# Patient Record
Sex: MALE | Race: WHITE | NOT HISPANIC OR LATINO | URBAN - METROPOLITAN AREA
[De-identification: names, ages, dates, MRNs, and addresses within clinical notes are randomized per-mention and may not be internally consistent; named-entity substitution may affect disease eponyms.]

---

## 2020-11-04 ENCOUNTER — OFFICE (AMBULATORY)
Dept: URBAN - METROPOLITAN AREA CLINIC 64 | Facility: CLINIC | Age: 70
End: 2020-11-04

## 2020-11-04 VITALS
DIASTOLIC BLOOD PRESSURE: 90 MMHG | WEIGHT: 173 LBS | HEART RATE: 69 BPM | HEIGHT: 68 IN | SYSTOLIC BLOOD PRESSURE: 147 MMHG

## 2020-11-04 DIAGNOSIS — Z12.11 ENCOUNTER FOR SCREENING FOR MALIGNANT NEOPLASM OF COLON: ICD-10-CM

## 2020-11-04 DIAGNOSIS — E80.7 DISORDER OF BILIRUBIN METABOLISM, UNSPECIFIED: ICD-10-CM

## 2020-11-04 DIAGNOSIS — E80.4 GILBERT SYNDROME: ICD-10-CM

## 2020-11-04 PROCEDURE — 99202 OFFICE O/P NEW SF 15 MIN: CPT | Performed by: INTERNAL MEDICINE

## 2023-02-09 ENCOUNTER — HOSPITAL ENCOUNTER (OUTPATIENT)
Facility: HOSPITAL | Age: 73
Discharge: HOME | End: 2023-02-12
Attending: EMERGENCY MEDICINE | Admitting: STUDENT IN AN ORGANIZED HEALTH CARE EDUCATION/TRAINING PROGRAM
Payer: MEDICARE

## 2023-02-09 ENCOUNTER — APPOINTMENT (OUTPATIENT)
Dept: GENERAL RADIOLOGY | Facility: HOSPITAL | Age: 73
End: 2023-02-09
Payer: MEDICARE

## 2023-02-09 ENCOUNTER — APPOINTMENT (OUTPATIENT)
Dept: ULTRASOUND IMAGING | Facility: HOSPITAL | Age: 73
End: 2023-02-09
Payer: MEDICARE

## 2023-02-09 DIAGNOSIS — R74.8 ELEVATED LIVER ENZYMES: ICD-10-CM

## 2023-02-09 DIAGNOSIS — R07.9 CHEST PAIN, UNSPECIFIED TYPE: ICD-10-CM

## 2023-02-09 DIAGNOSIS — K80.00 CALCULUS OF GALLBLADDER WITH ACUTE CHOLECYSTITIS WITHOUT OBSTRUCTION: Primary | ICD-10-CM

## 2023-02-09 DIAGNOSIS — R94.31 ABNORMAL EKG: ICD-10-CM

## 2023-02-09 DIAGNOSIS — I10 HYPERTENSION, UNSPECIFIED TYPE: ICD-10-CM

## 2023-02-09 DIAGNOSIS — R17 ELEVATED BILIRUBIN: ICD-10-CM

## 2023-02-09 PROBLEM — R79.89 ABNORMAL LFTS: Status: ACTIVE | Noted: 2023-02-09

## 2023-02-09 PROBLEM — K80.20 CHOLELITHIASIS: Status: ACTIVE | Noted: 2023-02-09

## 2023-02-09 PROBLEM — R77.8 ELEVATED TROPONIN: Status: ACTIVE | Noted: 2023-02-09

## 2023-02-09 LAB
ALBUMIN SERPL-MCNC: 4.7 G/DL (ref 3.5–5.2)
ALBUMIN/GLOB SERPL: 1.6 G/DL
ALP SERPL-CCNC: 212 U/L (ref 39–117)
ALT SERPL W P-5'-P-CCNC: 575 U/L (ref 1–41)
ANION GAP SERPL CALCULATED.3IONS-SCNC: 13 MMOL/L (ref 5–15)
APTT PPP: 26 SECONDS (ref 22.7–35.4)
AST SERPL-CCNC: 574 U/L (ref 1–40)
BASOPHILS # BLD AUTO: 0.04 10*3/MM3 (ref 0–0.2)
BASOPHILS NFR BLD AUTO: 0.7 % (ref 0–1.5)
BILIRUB SERPL-MCNC: 4.9 MG/DL (ref 0–1.2)
BUN SERPL-MCNC: 14 MG/DL (ref 8–23)
BUN/CREAT SERPL: 14.4 (ref 7–25)
CALCIUM SPEC-SCNC: 10.1 MG/DL (ref 8.6–10.5)
CHLORIDE SERPL-SCNC: 105 MMOL/L (ref 98–107)
CO2 SERPL-SCNC: 29 MMOL/L (ref 22–29)
CREAT SERPL-MCNC: 0.97 MG/DL (ref 0.76–1.27)
DEPRECATED RDW RBC AUTO: 41.2 FL (ref 37–54)
EGFRCR SERPLBLD CKD-EPI 2021: 81.9 ML/MIN/1.73
EOSINOPHIL # BLD AUTO: 0.03 10*3/MM3 (ref 0–0.4)
EOSINOPHIL NFR BLD AUTO: 0.5 % (ref 0.3–6.2)
ERYTHROCYTE [DISTWIDTH] IN BLOOD BY AUTOMATED COUNT: 12.6 % (ref 12.3–15.4)
GEN 5 2HR TROPONIN T REFLEX: 15 NG/L
GLOBULIN UR ELPH-MCNC: 2.9 GM/DL
GLUCOSE SERPL-MCNC: 126 MG/DL (ref 65–99)
HCT VFR BLD AUTO: 40.9 % (ref 37.5–51)
HGB BLD-MCNC: 13.5 G/DL (ref 13–17.7)
HOLD SPECIMEN: NORMAL
HOLD SPECIMEN: NORMAL
IMM GRANULOCYTES # BLD AUTO: 0.01 10*3/MM3 (ref 0–0.05)
IMM GRANULOCYTES NFR BLD AUTO: 0.2 % (ref 0–0.5)
INR PPP: 1.02 (ref 0.9–1.1)
LYMPHOCYTES # BLD AUTO: 0.91 10*3/MM3 (ref 0.7–3.1)
LYMPHOCYTES NFR BLD AUTO: 14.9 % (ref 19.6–45.3)
MAGNESIUM SERPL-MCNC: 2.3 MG/DL (ref 1.6–2.4)
MCH RBC QN AUTO: 30 PG (ref 26.6–33)
MCHC RBC AUTO-ENTMCNC: 33 G/DL (ref 31.5–35.7)
MCV RBC AUTO: 90.9 FL (ref 79–97)
MONOCYTES # BLD AUTO: 0.62 10*3/MM3 (ref 0.1–0.9)
MONOCYTES NFR BLD AUTO: 10.2 % (ref 5–12)
NEUTROPHILS NFR BLD AUTO: 4.48 10*3/MM3 (ref 1.7–7)
NEUTROPHILS NFR BLD AUTO: 73.5 % (ref 42.7–76)
NRBC BLD AUTO-RTO: 0 /100 WBC (ref 0–0.2)
NT-PROBNP SERPL-MCNC: 395 PG/ML (ref 0–900)
PLATELET # BLD AUTO: 260 10*3/MM3 (ref 140–450)
PMV BLD AUTO: 9.8 FL (ref 6–12)
POTASSIUM SERPL-SCNC: 4.6 MMOL/L (ref 3.5–5.2)
PROT SERPL-MCNC: 7.6 G/DL (ref 6–8.5)
PROTHROMBIN TIME: 13.5 SECONDS (ref 11.7–14.2)
QT INTERVAL: 381 MS
RBC # BLD AUTO: 4.5 10*6/MM3 (ref 4.14–5.8)
SODIUM SERPL-SCNC: 147 MMOL/L (ref 136–145)
TROPONIN T DELTA: 7 NG/L
TROPONIN T SERPL HS-MCNC: 8 NG/L
WBC NRBC COR # BLD: 6.09 10*3/MM3 (ref 3.4–10.8)
WHOLE BLOOD HOLD COAG: NORMAL
WHOLE BLOOD HOLD SPECIMEN: NORMAL

## 2023-02-09 PROCEDURE — 83735 ASSAY OF MAGNESIUM: CPT | Performed by: EMERGENCY MEDICINE

## 2023-02-09 PROCEDURE — 80053 COMPREHEN METABOLIC PANEL: CPT | Performed by: EMERGENCY MEDICINE

## 2023-02-09 PROCEDURE — G0378 HOSPITAL OBSERVATION PER HR: HCPCS

## 2023-02-09 PROCEDURE — 84484 ASSAY OF TROPONIN QUANT: CPT | Performed by: EMERGENCY MEDICINE

## 2023-02-09 PROCEDURE — 85025 COMPLETE CBC W/AUTO DIFF WBC: CPT | Performed by: EMERGENCY MEDICINE

## 2023-02-09 PROCEDURE — 99284 EMERGENCY DEPT VISIT MOD MDM: CPT

## 2023-02-09 PROCEDURE — 93010 ELECTROCARDIOGRAM REPORT: CPT | Performed by: INTERNAL MEDICINE

## 2023-02-09 PROCEDURE — 93005 ELECTROCARDIOGRAM TRACING: CPT | Performed by: EMERGENCY MEDICINE

## 2023-02-09 PROCEDURE — 76705 ECHO EXAM OF ABDOMEN: CPT

## 2023-02-09 PROCEDURE — 83880 ASSAY OF NATRIURETIC PEPTIDE: CPT | Performed by: EMERGENCY MEDICINE

## 2023-02-09 PROCEDURE — 96374 THER/PROPH/DIAG INJ IV PUSH: CPT

## 2023-02-09 PROCEDURE — 85730 THROMBOPLASTIN TIME PARTIAL: CPT | Performed by: EMERGENCY MEDICINE

## 2023-02-09 PROCEDURE — 71046 X-RAY EXAM CHEST 2 VIEWS: CPT

## 2023-02-09 PROCEDURE — 93005 ELECTROCARDIOGRAM TRACING: CPT

## 2023-02-09 PROCEDURE — 85610 PROTHROMBIN TIME: CPT | Performed by: EMERGENCY MEDICINE

## 2023-02-09 PROCEDURE — 99203 OFFICE O/P NEW LOW 30 MIN: CPT | Performed by: SURGERY

## 2023-02-09 PROCEDURE — 36415 COLL VENOUS BLD VENIPUNCTURE: CPT | Performed by: EMERGENCY MEDICINE

## 2023-02-09 RX ORDER — SODIUM CHLORIDE 0.9 % (FLUSH) 0.9 %
10 SYRINGE (ML) INJECTION AS NEEDED
Status: DISCONTINUED | OUTPATIENT
Start: 2023-02-09 | End: 2023-02-12 | Stop reason: HOSPADM

## 2023-02-09 RX ORDER — LISINOPRIL 10 MG/1
10 TABLET ORAL DAILY
Status: DISCONTINUED | OUTPATIENT
Start: 2023-02-09 | End: 2023-02-12 | Stop reason: HOSPADM

## 2023-02-09 RX ORDER — NALOXONE HCL 0.4 MG/ML
0.4 VIAL (ML) INJECTION
Status: DISCONTINUED | OUTPATIENT
Start: 2023-02-09 | End: 2023-02-12 | Stop reason: HOSPADM

## 2023-02-09 RX ORDER — LISINOPRIL 10 MG/1
TABLET ORAL DAILY
COMMUNITY
End: 2023-03-08

## 2023-02-09 RX ORDER — ONDANSETRON 2 MG/ML
4 INJECTION INTRAMUSCULAR; INTRAVENOUS EVERY 6 HOURS PRN
Status: DISCONTINUED | OUTPATIENT
Start: 2023-02-09 | End: 2023-02-12 | Stop reason: HOSPADM

## 2023-02-09 RX ORDER — SODIUM CHLORIDE 0.9 % (FLUSH) 0.9 %
10 SYRINGE (ML) INJECTION EVERY 12 HOURS SCHEDULED
Status: DISCONTINUED | OUTPATIENT
Start: 2023-02-09 | End: 2023-02-12 | Stop reason: HOSPADM

## 2023-02-09 RX ORDER — ASPIRIN 325 MG
325 TABLET ORAL ONCE
Status: DISCONTINUED | OUTPATIENT
Start: 2023-02-09 | End: 2023-02-09

## 2023-02-09 RX ORDER — MORPHINE SULFATE 2 MG/ML
1 INJECTION, SOLUTION INTRAMUSCULAR; INTRAVENOUS EVERY 4 HOURS PRN
Status: DISCONTINUED | OUTPATIENT
Start: 2023-02-09 | End: 2023-02-12 | Stop reason: HOSPADM

## 2023-02-09 RX ORDER — ATORVASTATIN CALCIUM 10 MG/1
TABLET, FILM COATED ORAL DAILY
COMMUNITY
End: 2023-02-12 | Stop reason: HOSPADM

## 2023-02-09 RX ORDER — FAMOTIDINE 10 MG/ML
20 INJECTION, SOLUTION INTRAVENOUS EVERY 12 HOURS SCHEDULED
Status: DISCONTINUED | OUTPATIENT
Start: 2023-02-09 | End: 2023-02-12 | Stop reason: HOSPADM

## 2023-02-09 RX ORDER — NITROGLYCERIN 0.4 MG/1
0.4 TABLET SUBLINGUAL
Status: DISCONTINUED | OUTPATIENT
Start: 2023-02-09 | End: 2023-02-12 | Stop reason: HOSPADM

## 2023-02-09 RX ORDER — SODIUM CHLORIDE 9 MG/ML
40 INJECTION, SOLUTION INTRAVENOUS AS NEEDED
Status: DISCONTINUED | OUTPATIENT
Start: 2023-02-09 | End: 2023-02-12 | Stop reason: HOSPADM

## 2023-02-09 RX ADMIN — Medication 10 ML: at 20:29

## 2023-02-09 RX ADMIN — FAMOTIDINE 20 MG: 10 INJECTION INTRAVENOUS at 20:29

## 2023-02-09 NOTE — ED TRIAGE NOTES
.Pt masked on arrival, staff masked    Pt reports intermittent episodes of chest discomfort over the last month, pain returned this am, describes as pressure, is mid-sternal, belching a lot today

## 2023-02-09 NOTE — H&P
Internal medicine history and physical  INTERNAL MEDICINE   [facility]       Patient Identification:  Name: Guillermo Salgado  Age: 72 y.o.  Sex: male  :  1950  MRN: [id number]                   Primary Care Physician: Radha Jacobson MD                               Date of admission:2023    Chief Complaint: Mid chest discomfort started few hours after eating his meal last night then resolved only to recur again at 3:30 in the morning with associated nausea.    History of Present Illness:   Patient is a 72-year-old male with past medical history of prior abnormal LFTs and was told that he has Gilbert syndrome, history of hypertension and dyslipidemia and takes only lisinopril and Lipitor was in his usual state of his health until about a month or so ago when he started having episodes of chest discomfort couple hours after eating food.  This episode lasted for hours or so and go away and in those episodes he may have some episodes of nausea.  Over the course of last month he has about 3 or 4 episodes with last episode prior to yesterday was about a week ago.  Patient was feeling otherwise fine had some soup yesterday and afterwards noticed significant discomfort in his chest and upper abdomen that resolved over the course of couple hours.  He was able to go to bed and woke up 3:30 in the morning with this discomfort.  This time it did not resolve by itself he was very concerned about possibility of heart involvement.  This discomfort feels as if his pressure involving the middle of the chest going into the back and does not get worse with activity or movement.  Evaluation in the emergency room revealed abnormal LFTs including elevated bilirubin and initial troponin being negative.  His EKG is unremarkable.  Repeat troponin shows elevation with delta of 7.  Patient is currently pain-free and feels well.  Ultrasound of the abdomen reveal cholelithiasis with gallbladder sludge without any  "evidence of cholecystitis.  CBD diameter was noted to be 6 mm.  Patient is being admitted for further care.    Past Medical History:  Past Medical History:   Diagnosis Date   • Gilbert syndrome    • Hyperlipidemia    • Hypertension      Past Surgical History:  Past Surgical History:   Procedure Laterality Date   • PILONIDAL CYST DRAINAGE        Home Meds:  Medications Prior to Admission   Medication Sig Dispense Refill Last Dose   • atorvastatin (LIPITOR) 10 MG tablet Take  by mouth Daily.   2/9/2023   • lisinopril (PRINIVIL,ZESTRIL) 10 MG tablet Take  by mouth Daily.        Current Meds:     Current Facility-Administered Medications:   •  sodium chloride 0.9 % flush 10 mL, 10 mL, Intravenous, PRN, Alonso Boyle MD  Allergies:  No Known Allergies  Social History:   Social History     Tobacco Use   • Smoking status: Former     Types: Cigarettes   • Smokeless tobacco: Not on file   Substance Use Topics   • Alcohol use: Yes      Family History:  History reviewed. No pertinent family history.       Review of Systems  See history of present illness and past medical history.   Constitutional: Remarkable for no fever or chills  Cardiovascular: Remarkable for chest discomfort as described it is clearly associated after having a meal.  The details of which are mentioned in the history of presenting illness.  Respiratory: Remarkable for no cough hemoptysis or shortness of breath or pleuritic chest pain  GI: Remarkable for chest discomfort as described  : Remarkable for no burning in urination frequency or urgency  Musculoskeletal: Remarkable for no new joint aches and pain patient denies any muscle pain.  Neurological: Remarkable for no loss of consciousness or continence.  Remainder of ROS is negative.      Vitals:   /82 (BP Location: Left arm, Patient Position: Lying)   Pulse 99   Temp 98.1 °F (36.7 °C) (Oral)   Resp 16   Ht 172.7 cm (68\")   Wt 77.1 kg (170 lb)   SpO2 99%   BMI 25.85 kg/m²   I/O: No " intake or output data in the 24 hours ending 02/09/23 2569  Exam:  Patient is examined using the personal protective equipment as per guidelines from infection control for this particular patient as enacted.  Hand washing was performed before and after patient interaction.  General Appearance:    Alert, cooperative, no distress, appears stated age   Head:    Normocephalic, without obvious abnormality, atraumatic   Eyes:    PERRL, conjunctiva/corneas icteric, EOM's intact, both eyes   Ears:    Normal external ear canals, both ears   Nose:   Nares normal, septum midline, mucosa normal, no drainage    or sinus tenderness   Throat:   Lips, tongue, gums normal; oral mucosa pink and moist   Neck:   Supple, symmetrical, trachea midline, no adenopathy;     thyroid:  no enlargement/tenderness/nodules; no carotid    bruit or JVD   Back:     Symmetric, no curvature, ROM normal, no CVA tenderness   Lungs:     Clear to auscultation bilaterally, respirations unlabored   Chest Wall:    No tenderness or deformity    Heart:   S1-S2 regular no murmur heard   Abdomen:    No significant epigastric or right upper quadrant tenderness or rebound noted   Extremities:   Extremities normal, atraumatic, no cyanosis or edema   Pulses:   Pulses palpable in all extremities; symmetric all extremities   Skin:   Skin color normal, Skin is warm and dry,  no rashes or palpable lesions   Neurologic:  Alert and oriented and grossly nonfocal       Data Review:      I reviewed the patient's new clinical results.  Results from last 7 days   Lab Units 02/09/23  1259   WBC 10*3/mm3 6.09   HEMOGLOBIN g/dL 13.5   PLATELETS 10*3/mm3 260     Results from last 7 days   Lab Units 02/09/23  1259   SODIUM mmol/L 147*   POTASSIUM mmol/L 4.6   CHLORIDE mmol/L 105   CO2 mmol/L 29.0   BUN mg/dL 14   CREATININE mg/dL 0.97   CALCIUM mg/dL 10.1   GLUCOSE mg/dL 126*     Microbiology Results (last 10 days)     ** No results found for the last 240 hours. **      .rad  ECG 12  Lead Chest Pain   Final Result   HEART RATE= 87  bpm   RR Interval= 690  ms   NM Interval= 163  ms   P Horizontal Axis= -5  deg   P Front Axis= 72  deg   QRSD Interval= 107  ms   QT Interval= 381  ms   QRS Axis= -17  deg   T Wave Axis= 97  deg   - ABNORMAL ECG -   Sinus rhythm   Probable left atrial enlargement   Borderline left axis deviation   Borderline repolarization abnormality   No Prior Tracing for Comparison   Electronically Signed By: Carine Saldana ([facility]) 09-Feb-2023 15:57:46   Date and Time of Study: 2023-02-09 12:48:27        Repeat troponin went up from 8-15 with the delta of 7  Assessment:  Active Hospital Problems    Diagnosis  POA   • **Chest pain, unspecified type [R07.9]  Yes   • Abnormal LFTs [R79.89]  Unknown   • Cholelithiasis [K80.20]  Unknown   • Elevated troponin [R77.8]  Unknown   • Hypertension [I10]  Unknown       Medical decision making/care plan:  Chest discomfort recurrent off-and-on worse in the course of last month or so specifically after eating a meal with abnormal LFTs and gallbladder evidence of cholelithiasis with normal EKG and slight worsening of high sensitive troponin after initial 1 being normal-in the setting of prior history of dyslipidemia and hypertension.  This presentation in this above context is concerning for:  1-symptomatic cholelithiasis with possible choledocholithiasis occurring on an intermittent basis explaining the frequent prandial discomfort as detailed above with no clinical evidence of acute cholecystitis at present  2-given slight worsening of his high sensitive troponin and risk factors concomitant/superimposed coronary event with no EKG changes  Plan is to consult general surgery service and cardiology service get MRCP and check serial troponin and keep him n.p.o. after midnight.  Patient does not have any evidence of acute cholecystitis and at present concern for cholangitis is low so would not start any antibiotic therapy.  Check lipase level in  a.m.  Hypertension-continue with antihypertensive regimen  History of dyslipidemia on atorvastatin-given his abnormal LFTs hold atorvastatin    Anoop Trujillo MD   2/9/2023  16:39 EST    Parts of this note may be an electronic transcription/translation of spoken language to printed text using the Dragon dictation system.

## 2023-02-09 NOTE — ED NOTES
"Nursing report ED to floor  Guillermo Salgado  72 y.o.  male    HPI :   Chief Complaint   Patient presents with    Chest Pain       Admitting doctor:   Anoop Trujillo MD    Admitting diagnosis:   The primary encounter diagnosis was Chest pain, unspecified type. Diagnoses of Abnormal EKG, Hypertension, unspecified type, Elevated liver enzymes, and Elevated bilirubin were also pertinent to this visit.    Code status:   Current Code Status       Date Active Code Status Order ID Comments User Context       Not on file            Allergies:   Patient has no known allergies.    Isolation:   No active isolations    Intake and Output  No intake or output data in the 24 hours ending 02/09/23 1516    Weight:       02/09/23  1248   Weight: 77.1 kg (170 lb)       Most recent vitals:   Vitals:    02/09/23 1240 02/09/23 1248 02/09/23 1251 02/09/23 1308   BP:   148/83 150/84   BP Location:    Right arm   Patient Position:    Lying   Pulse: 78   78   Resp: 18   14   Temp: 98.7 °F (37.1 °C)      TempSrc: Tympanic      SpO2: 98%   98%   Weight:  77.1 kg (170 lb)     Height:  172.7 cm (68\")         Active LDAs/IV Access:   Lines, Drains & Airways       Active LDAs       None                    Labs (abnormal labs have a star):   Labs Reviewed   COMPREHENSIVE METABOLIC PANEL - Abnormal; Notable for the following components:       Result Value    Glucose 126 (*)     Sodium 147 (*)     ALT (SGPT) 575 (*)     AST (SGOT) 574 (*)     Alkaline Phosphatase 212 (*)     Total Bilirubin 4.9 (*)     All other components within normal limits    Narrative:     GFR Normal >60  Chronic Kidney Disease <60  Kidney Failure <15    The GFR formula is only valid for adults with stable renal function between ages 18 and 70.   CBC WITH AUTO DIFFERENTIAL - Abnormal; Notable for the following components:    Lymphocyte % 14.9 (*)     All other components within normal limits   TROPONIN - Normal    Narrative:     High Sensitive Troponin T Reference Range:  <10.0 ng/L- " Negative Female for AMI  <15.0 ng/L- Negative Male for AMI  >=10 - Abnormal Female indicating possible myocardial injury.  >=15 - Abnormal Male indicating possible myocardial injury.   Clinicians would have to utilize clinical acumen, EKG, Troponin, and serial changes to determine if it is an Acute Myocardial Infarction or myocardial injury due to an underlying chronic condition.        PROTIME-INR - Normal   APTT - Normal   MAGNESIUM - Normal   BNP (IN-HOUSE) - Normal    Narrative:     Among patients with dyspnea, NT-proBNP is highly sensitive for the detection of acute congestive heart failure. In addition NT-proBNP of <300 pg/ml effectively rules out acute congestive heart failure with 99% negative predictive value.    Results may be falsely decreased if patient taking Biotin.     RAINBOW DRAW    Narrative:     The following orders were created for panel order [place] Draw.  Procedure                               Abnormality         Status                     ---------                               -----------         ------                     Green Top (Gel)[[id number]]                                  Final result               Lavender Top[[id number]]                                     Final result               Gold Top - SST[[id number]]                                   Final result               Light Blue Top[[id number]]                                   Final result                 Please view results for these tests on the individual orders.   HIGH SENSITIVITIY TROPONIN T 2HR   CBC AND DIFFERENTIAL    Narrative:     The following orders were created for panel order CBC & Differential.  Procedure                               Abnormality         Status                     ---------                               -----------         ------                     CBC Auto Differential[[id number]]        Abnormal            Final result                 Please view results for these tests on the individual orders.    GREEN TOP   LAVENDER TOP   GOLD TOP - SST   LIGHT BLUE TOP       EKG:   ECG 12 Lead Chest Pain   Preliminary Result   HEART RATE= 87  bpm   RR Interval= 690  ms   RI Interval= 163  ms   P Horizontal Axis= -5  deg   P Front Axis= 72  deg   QRSD Interval= 107  ms   QT Interval= 381  ms   QRS Axis= -17  deg   T Wave Axis= 97  deg   - ABNORMAL ECG -   Sinus rhythm   Probable left atrial enlargement   Borderline left axis deviation   Abnormal R-wave progression, early transition   Borderline repolarization abnormality   Electronically Signed By:    Date and Time of Study: 2023-02-09 12:48:27          Meds given in ED:   Medications   sodium chloride 0.9 % flush 10 mL (has no administration in time range)       Imaging results:  XR Chest 2 View    Result Date: 2/9/2023  No evidence for acute pulmonary process. Follow-up as clinical indications persist.  This report was finalized on 2/9/2023 1:59 PM by Dr. Mahendra Sigala M.D.       Ambulatory status:   - ad edgardo    Social issues:   Social History     Socioeconomic History    Marital status:    Tobacco Use    Smoking status: Former     Types: Cigarettes   Substance and Sexual Activity    Alcohol use: Yes    Drug use: Never       NIH Stroke Scale:         Cheryl Martins RN  02/09/23 15:16 EST

## 2023-02-09 NOTE — ED PROVIDER NOTES
EMERGENCY DEPARTMENT ENCOUNTER  I wore full protective equipment throughout this patient encounter including a N95 mask, eye shield, gown and gloves. Hand hygiene was performed before donning protective equipment and after removal when leaving the room.    Room Number:  15/15  Date of encounter:  2/9/2023  PCP: Radha Jacobson MD  Patient Care Team:  Radha Jacobson MD as PCP - General (Family Medicine)     HPI:  Context: Guillermo Salgado is a 72 y.o. male who presents to the ED c/o chief complaint of chest pain.  Patient complains of well localized sternal chest pain that began at approximately 330 this morning.  Chest pain lasted until he was on his way into the hospital and then now resolved.  Patient denies any chest pain at present, no other symptoms at present.  Patient reports chest pain was described as pressure, sternal, radiating through to the back.  No radiation to neck or extremities, pain was nonexertional, no associated shortness of breath, nausea vomiting, diaphoresis.  Patient reports that he had an episode of similar chest pain last night although it resolved.  Patient reports that he has had 2 more of these episodes over the last month.  Patient denies any cardiac history, does have a history of hypertension hyperlipidemia, remote reports remote history of stress test that was negative.  Patient is not followed by cardiology.  Patient denies any abdominal pain.  Patient does report that last night when he had the pain the pain was worsened with pressure in his belly.  Patient reports he has been eating and drinking normally, normal bowel function.    MEDICAL HISTORY REVIEW  Reviewed in EPIC    PAST MEDICAL HISTORY  Active Ambulatory Problems     Diagnosis Date Noted   • No Active Ambulatory Problems     Resolved Ambulatory Problems     Diagnosis Date Noted   • No Resolved Ambulatory Problems     Past Medical History:   Diagnosis Date   • Gilbert syndrome    • Hyperlipidemia    • Hypertension         PAST SURGICAL HISTORY  Past Surgical History:   Procedure Laterality Date   • PILONIDAL CYST DRAINAGE         FAMILY HISTORY  History reviewed. No pertinent family history.    SOCIAL HISTORY  Social History     Socioeconomic History   • Marital status:    Tobacco Use   • Smoking status: Former     Types: Cigarettes   Substance and Sexual Activity   • Alcohol use: Yes   • Drug use: Never       ALLERGIES  Patient has no known allergies.    The patient's allergies have been reviewed    REVIEW OF SYSTEMS  All systems reviewed and negative except for those discussed in HPI.     PHYSICAL EXAM  I have reviewed the triage vital signs and nursing notes.  ED Triage Vitals   Temp Heart Rate Resp BP SpO2   02/09/23 1240 02/09/23 1240 02/09/23 1240 02/09/23 1251 02/09/23 1240   98.7 °F (37.1 °C) 78 18 148/83 98 %      Temp src Heart Rate Source Patient Position BP Location FiO2 (%)   02/09/23 1240 -- -- -- --   Tympanic           General: No acute distress.  HENT: NCAT, PERRL, Nares patent.  Eyes: no scleral icterus.  Neck: trachea midline, no ROM limitations.  CV: regular rhythm, regular rate.  Respiratory: normal effort, CTAB.  Abdomen: soft, nondistended, NTTP, no rebound tenderness, no guarding or rigidity.  Musculoskeletal: no deformity.  Neuro: alert, moves all extremities, follows commands.  Skin: warm, dry.    LAB RESULTS  Recent Results (from the past 24 hour(s))   ECG 12 Lead Chest Pain    Collection Time: 02/09/23 12:48 PM   Result Value Ref Range    QT Interval 381 ms   Comprehensive Metabolic Panel    Collection Time: 02/09/23 12:59 PM    Specimen: Blood   Result Value Ref Range    Glucose 126 (H) 65 - 99 mg/dL    BUN 14 8 - 23 mg/dL    Creatinine 0.97 0.76 - 1.27 mg/dL    Sodium 147 (H) 136 - 145 mmol/L    Potassium 4.6 3.5 - 5.2 mmol/L    Chloride 105 98 - 107 mmol/L    CO2 29.0 22.0 - 29.0 mmol/L    Calcium 10.1 8.6 - 10.5 mg/dL    Total Protein 7.6 6.0 - 8.5 g/dL    Albumin 4.7 3.5 - 5.2 g/dL    ALT  (SGPT) 575 (H) 1 - 41 U/L    AST (SGOT) 574 (H) 1 - 40 U/L    Alkaline Phosphatase 212 (H) 39 - 117 U/L    Total Bilirubin 4.9 (H) 0.0 - 1.2 mg/dL    Globulin 2.9 gm/dL    A/G Ratio 1.6 g/dL    BUN/Creatinine Ratio 14.4 7.0 - 25.0    Anion Gap 13.0 5.0 - 15.0 mmol/L    eGFR 81.9 >60.0 mL/min/1.73   High Sensitivity Troponin T    Collection Time: 02/09/23 12:59 PM    Specimen: Blood   Result Value Ref Range    HS Troponin T 8 <15 ng/L   Green Top (Gel)    Collection Time: 02/09/23 12:59 PM   Result Value Ref Range    Extra Tube Hold for add-ons.    Lavender Top    Collection Time: 02/09/23 12:59 PM   Result Value Ref Range    Extra Tube hold for add-on    Gold Top - SST    Collection Time: 02/09/23 12:59 PM   Result Value Ref Range    Extra Tube Hold for add-ons.    Light Blue Top    Collection Time: 02/09/23 12:59 PM   Result Value Ref Range    Extra Tube Hold for add-ons.    CBC Auto Differential    Collection Time: 02/09/23 12:59 PM    Specimen: Blood   Result Value Ref Range    WBC 6.09 3.40 - 10.80 10*3/mm3    RBC 4.50 4.14 - 5.80 10*6/mm3    Hemoglobin 13.5 13.0 - 17.7 g/dL    Hematocrit 40.9 37.5 - 51.0 %    MCV 90.9 79.0 - 97.0 fL    MCH 30.0 26.6 - 33.0 pg    MCHC 33.0 31.5 - 35.7 g/dL    RDW 12.6 12.3 - 15.4 %    RDW-SD 41.2 37.0 - 54.0 fl    MPV 9.8 6.0 - 12.0 fL    Platelets 260 140 - 450 10*3/mm3    Neutrophil % 73.5 42.7 - 76.0 %    Lymphocyte % 14.9 (L) 19.6 - 45.3 %    Monocyte % 10.2 5.0 - 12.0 %    Eosinophil % 0.5 0.3 - 6.2 %    Basophil % 0.7 0.0 - 1.5 %    Immature Grans % 0.2 0.0 - 0.5 %    Neutrophils, Absolute 4.48 1.70 - 7.00 10*3/mm3    Lymphocytes, Absolute 0.91 0.70 - 3.10 10*3/mm3    Monocytes, Absolute 0.62 0.10 - 0.90 10*3/mm3    Eosinophils, Absolute 0.03 0.00 - 0.40 10*3/mm3    Basophils, Absolute 0.04 0.00 - 0.20 10*3/mm3    Immature Grans, Absolute 0.01 0.00 - 0.05 10*3/mm3    nRBC 0.0 0.0 - 0.2 /100 WBC   Protime-INR    Collection Time: 02/09/23 12:59 PM    Specimen: Blood   Result  Value Ref Range    Protime 13.5 11.7 - 14.2 Seconds    INR 1.02 0.90 - 1.10   aPTT    Collection Time: 02/09/23 12:59 PM    Specimen: Blood   Result Value Ref Range    PTT 26.0 22.7 - 35.4 seconds   Magnesium    Collection Time: 02/09/23 12:59 PM    Specimen: Blood   Result Value Ref Range    Magnesium 2.3 1.6 - 2.4 mg/dL   BNP    Collection Time: 02/09/23 12:59 PM    Specimen: Blood   Result Value Ref Range    proBNP 395.0 0.0 - 900.0 pg/mL       I ordered the above labs and reviewed the results.    RADIOLOGY  XR Chest 2 View    Result Date: 2/9/2023  XR CHEST 2 VW-  HISTORY: Male who is 72 years-old,  chest pain  TECHNIQUE: Frontal and lateral views of the chest  COMPARISON: None available  FINDINGS: Heart, mediastinum and pulmonary vasculature are unremarkable. No focal pulmonary consolidation, pleural effusion, or pneumothorax. Old granulomatous disease is seen. No acute osseous process.      No evidence for acute pulmonary process. Follow-up as clinical indications persist.  This report was finalized on 2/9/2023 1:59 PM by Dr. Mahendra Sigala M.D.        I ordered the above noted radiological studies. I reviewed the images and results. I agree with the radiologist interpretation.    PROCEDURES  Procedures    MEDICATIONS GIVEN IN ER  Medications   sodium chloride 0.9 % flush 10 mL (has no administration in time range)       PROGRESS, DATA ANALYSIS, CONSULTS, AND MEDICAL DECISION MAKING  A complete history and physical exam have been performed.  All available laboratory and imaging results have been reviewed by myself prior to disposition.    MDM  After the initial H&P, I discussed pertinent information from history and physical exam with patient/family.  Discussed differential diagnosis.  Discussed plan for ED evaluation/workup/treatment.  All questions answered.  Patient/family is agreeable with plan.  ED Course as of 02/09/23 1459   Thu Feb 09, 2023   1301 My differential diagnosis for chest pain includes  but is not limited to:  Muscle strain, costochondritis, myositis, pleurisy, rib fracture, intercostal neuritis, herpes zoster, tumor, myocardial infarction, coronary syndrome, unstable angina, angina, aortic dissection, mitral valve prolapse, pericarditis, palpitations, pulmonary embolus, pneumonia, pneumothorax, lung cancer, GERD, esophagitis, esophageal spasm     [JG]   1301 EKG independently viewed and contemporaneously interpreted by ED physician. Time: 12:48 PM.  Rate 87.  Interpretation: Normal sinus rhythm, normal axis, left atrial enlargement, early R wave transition, incomplete right bundle branch block, slight ST depression with T wave inversion in V2 and V3, slight ST depression in V4 through V6, T wave inversion in aVL.  No ST elevation.  No prior EKG for comparison. [JG]   1309 Patient presents with chest pain, currently asymptomatic.  EKG abnormal, no prior for comparison.  Patient is 72 years of age with history of hypertension and hyperlipidemia with abnormal EKG.  Obtaining cardiac work-up including troponin, plan for admission for further evaluation. [JG]   1346 I reviewed chest x-ray on PACS, midline trachea, normal mediastinum, hyperinflated lungs, no pulmonary infiltrates. [JG]   1348 EKG abnormal but initial troponin negative, chest x-ray unremarkable. [JG]   1348 HEART SCORE:    History #0  (Highly suspicious 2, Moderately suspicious 1, Slightly or non-suspicious 0)    ECG #1  (Significant ST depression 2,  Nonspecific repol disturbance 1, Normal 0)    Age #2  (> or = 65 2, 46-65 1,  < or = 45 0)    Risk factors #1  (hypercholesterolemia, HTN, DM, smoking, pos fam hx, obesity)  (> or = to 3 RF 2, 1 or 2 1, No risk factors 0)    Troponin #0  (> or = 3x normal limit 2, 1-3x normal limit 1, < or = Normal limit 0)    HEART Score Key:  Scores 0-3: 0.9-1.7% risk of adverse cardiac event. In the HEART Score study, these patients were discharged (0.99% in the retrospective study, 1.7% in the  prospective study)  Scores 4-6: 12-16.6% risk of adverse cardiac event. In the HEART Score study, these patients were admitted to the hospital. (11.6% retrospective, 16.6% prospective)  Scores =7: 50-65% risk of adverse cardiac event. In the HEART Score study, these patients were candidates for early invasive measures. (65.2% retrospective, 50.1% prospective)      This patient's HEART score is 4     [JG]   1348 Patient has elevation of liver enzymes as well as bilirubin.  Patient denied any abdominal pain on initial presentation.  Patient reassessed, continues to deny abdominal pain, no tenderness in the right upper quadrant, negative Dawkins's.  Will obtain ultrasound imaging for further evaluation.  Consulting hospitalist for admission. [JG]   1413 Patient reassessed.  Discussed ED findings, differential diagnosis, and the need for admission for evaluation/treatment.  They are agreeable to admission and all questions were answered.     [JG]   1422 Phone call with Dr. Rodriguez, cardiology.  Discussed the patient, relevant history, exam, diagnostics, ED findings/progress, and concerns. They agree to consult.    [JG]   1458 Phone call with Dr Trujillo, A.  Discussed the patient, relevant history, exam, diagnostics, ED findings/progress, and concerns. They agree to admit the patient to telemetry observation. Care assumed by the admitting physician at this time.     [JG]      ED Course User Index  [JG] Alonso Boyle MD       AS OF 14:59 EST VITALS:    BP - 150/84  HR - 78  TEMP - 98.7 °F (37.1 °C) (Tympanic)  O2 SATS - 98%    DIAGNOSIS  Final diagnoses:   Chest pain, unspecified type   Abnormal EKG   Hypertension, unspecified type   Elevated liver enzymes   Elevated bilirubin         DISPOSITION  ADMISSION    Discussed treatment plan and reason for admission with pt/family and admitting physician.  Pt/family voiced understanding of the plan for admission for further testing/treatment as needed.          Tamar  Alonso LAMB MD  02/09/23 145

## 2023-02-09 NOTE — PLAN OF CARE
Goal Outcome Evaluation:  Plan of Care Reviewed With: patient      Progress: improving  Outcome Evaluation: VSS, pt admitted c/o CP, now denies, US gallbladder, EKG, & CXR completed, awaiting General Sx & cardiology consult, MRI MRCP ordered

## 2023-02-10 ENCOUNTER — APPOINTMENT (OUTPATIENT)
Dept: MRI IMAGING | Facility: HOSPITAL | Age: 73
End: 2023-02-10
Payer: MEDICARE

## 2023-02-10 ENCOUNTER — APPOINTMENT (OUTPATIENT)
Dept: CARDIOLOGY | Facility: HOSPITAL | Age: 73
End: 2023-02-10
Payer: MEDICARE

## 2023-02-10 ENCOUNTER — PREP FOR SURGERY (OUTPATIENT)
Dept: OTHER | Facility: HOSPITAL | Age: 73
End: 2023-02-10
Payer: MEDICARE

## 2023-02-10 DIAGNOSIS — K80.00 CALCULUS OF GALLBLADDER WITH ACUTE CHOLECYSTITIS WITHOUT OBSTRUCTION: Primary | ICD-10-CM

## 2023-02-10 LAB
ALBUMIN SERPL-MCNC: 4.2 G/DL (ref 3.5–5.2)
ALBUMIN/GLOB SERPL: 1.4 G/DL
ALP SERPL-CCNC: 218 U/L (ref 39–117)
ALT SERPL W P-5'-P-CCNC: 450 U/L (ref 1–41)
ANION GAP SERPL CALCULATED.3IONS-SCNC: 12.5 MMOL/L (ref 5–15)
AORTIC DIMENSIONLESS INDEX: 0.8 (DI)
ASCENDING AORTA: 2.9 CM
AST SERPL-CCNC: 273 U/L (ref 1–40)
BASOPHILS # BLD AUTO: 0.05 10*3/MM3 (ref 0–0.2)
BASOPHILS NFR BLD AUTO: 0.9 % (ref 0–1.5)
BH CV ECHO MEAS - ACS: 1.92 CM
BH CV ECHO MEAS - AO MAX PG: 6.5 MMHG
BH CV ECHO MEAS - AO MEAN PG: 3.5 MMHG
BH CV ECHO MEAS - AO ROOT DIAM: 3 CM
BH CV ECHO MEAS - AO V2 MAX: 127.6 CM/SEC
BH CV ECHO MEAS - AO V2 VTI: 24.9 CM
BH CV ECHO MEAS - AVA(I,D): 2.7 CM2
BH CV ECHO MEAS - EDV(CUBED): 121.4 ML
BH CV ECHO MEAS - EDV(MOD-SP2): 89 ML
BH CV ECHO MEAS - EDV(MOD-SP4): 103 ML
BH CV ECHO MEAS - EF(MOD-BP): 69.5 %
BH CV ECHO MEAS - EF(MOD-SP2): 67.4 %
BH CV ECHO MEAS - EF(MOD-SP4): 70.9 %
BH CV ECHO MEAS - ESV(CUBED): 29.8 ML
BH CV ECHO MEAS - ESV(MOD-SP2): 29 ML
BH CV ECHO MEAS - ESV(MOD-SP4): 30 ML
BH CV ECHO MEAS - FS: 37.4 %
BH CV ECHO MEAS - IVS/LVPW: 1.08 CM
BH CV ECHO MEAS - IVSD: 0.94 CM
BH CV ECHO MEAS - LAT PEAK E' VEL: 10.6 CM/SEC
BH CV ECHO MEAS - LV MASS(C)D: 156.9 GRAMS
BH CV ECHO MEAS - LV MAX PG: 3.7 MMHG
BH CV ECHO MEAS - LV MEAN PG: 1.74 MMHG
BH CV ECHO MEAS - LV V1 MAX: 95.5 CM/SEC
BH CV ECHO MEAS - LV V1 VTI: 20.7 CM
BH CV ECHO MEAS - LVIDD: 5 CM
BH CV ECHO MEAS - LVIDS: 3.1 CM
BH CV ECHO MEAS - LVOT AREA: 3.3 CM2
BH CV ECHO MEAS - LVOT DIAM: 2.05 CM
BH CV ECHO MEAS - LVPWD: 0.87 CM
BH CV ECHO MEAS - MED PEAK E' VEL: 8.5 CM/SEC
BH CV ECHO MEAS - MR MAX PG: 73.5 MMHG
BH CV ECHO MEAS - MR MAX VEL: 428.6 CM/SEC
BH CV ECHO MEAS - MV A DUR: 0.14 SEC
BH CV ECHO MEAS - MV A MAX VEL: 69.8 CM/SEC
BH CV ECHO MEAS - MV DEC SLOPE: 472.9 CM/SEC2
BH CV ECHO MEAS - MV DEC TIME: 0.18 MSEC
BH CV ECHO MEAS - MV E MAX VEL: 66 CM/SEC
BH CV ECHO MEAS - MV E/A: 0.95
BH CV ECHO MEAS - MV MAX PG: 2.8 MMHG
BH CV ECHO MEAS - MV MEAN PG: 1.27 MMHG
BH CV ECHO MEAS - MV P1/2T: 52.3 MSEC
BH CV ECHO MEAS - MV V2 VTI: 18.4 CM
BH CV ECHO MEAS - MVA(P1/2T): 4.2 CM2
BH CV ECHO MEAS - MVA(VTI): 3.7 CM2
BH CV ECHO MEAS - PA ACC TIME: 0.15 SEC
BH CV ECHO MEAS - PA PR(ACCEL): 11.3 MMHG
BH CV ECHO MEAS - PA V2 MAX: 74.7 CM/SEC
BH CV ECHO MEAS - PULM A REVS DUR: 0.11 SEC
BH CV ECHO MEAS - PULM A REVS VEL: 35.5 CM/SEC
BH CV ECHO MEAS - PULM DIAS VEL: 37.4 CM/SEC
BH CV ECHO MEAS - PULM S/D: 1.08
BH CV ECHO MEAS - PULM SYS VEL: 40.2 CM/SEC
BH CV ECHO MEAS - QP/QS: 0.26
BH CV ECHO MEAS - RAP SYSTOLE: 3 MMHG
BH CV ECHO MEAS - RV MAX PG: 1.02 MMHG
BH CV ECHO MEAS - RV V1 MAX: 50.6 CM/SEC
BH CV ECHO MEAS - RV V1 VTI: 5.7 CM
BH CV ECHO MEAS - RVOT DIAM: 1.98 CM
BH CV ECHO MEAS - RVSP: 29 MMHG
BH CV ECHO MEAS - SV(LVOT): 68 ML
BH CV ECHO MEAS - SV(MOD-SP2): 60 ML
BH CV ECHO MEAS - SV(MOD-SP4): 73 ML
BH CV ECHO MEAS - SV(RVOT): 17.6 ML
BH CV ECHO MEAS - TAPSE (>1.6): 2.8 CM
BH CV ECHO MEAS - TR MAX PG: 25.7 MMHG
BH CV ECHO MEAS - TR MAX VEL: 253.4 CM/SEC
BH CV ECHO MEASUREMENTS AVERAGE E/E' RATIO: 6.91
BH CV XLRA - RV BASE: 3.7 CM
BH CV XLRA - RV LENGTH: 8.1 CM
BH CV XLRA - RV MID: 4.1 CM
BH CV XLRA - TDI S': 15 CM/SEC
BILIRUB SERPL-MCNC: 3.4 MG/DL (ref 0–1.2)
BUN SERPL-MCNC: 13 MG/DL (ref 8–23)
BUN/CREAT SERPL: 14.9 (ref 7–25)
CALCIUM SPEC-SCNC: 9.2 MG/DL (ref 8.6–10.5)
CHLORIDE SERPL-SCNC: 103 MMOL/L (ref 98–107)
CO2 SERPL-SCNC: 24.5 MMOL/L (ref 22–29)
CREAT SERPL-MCNC: 0.87 MG/DL (ref 0.76–1.27)
D-LACTATE SERPL-SCNC: 1 MMOL/L (ref 0.5–2)
DEPRECATED RDW RBC AUTO: 42.6 FL (ref 37–54)
EGFRCR SERPLBLD CKD-EPI 2021: 90.5 ML/MIN/1.73
EOSINOPHIL # BLD AUTO: 0.06 10*3/MM3 (ref 0–0.4)
EOSINOPHIL NFR BLD AUTO: 1.1 % (ref 0.3–6.2)
ERYTHROCYTE [DISTWIDTH] IN BLOOD BY AUTOMATED COUNT: 12.8 % (ref 12.3–15.4)
GLOBULIN UR ELPH-MCNC: 3 GM/DL
GLUCOSE SERPL-MCNC: 97 MG/DL (ref 65–99)
HCT VFR BLD AUTO: 39.2 % (ref 37.5–51)
HGB BLD-MCNC: 13.2 G/DL (ref 13–17.7)
IMM GRANULOCYTES # BLD AUTO: 0.03 10*3/MM3 (ref 0–0.05)
IMM GRANULOCYTES NFR BLD AUTO: 0.6 % (ref 0–0.5)
LEFT ATRIUM VOLUME INDEX: 19.1 ML/M2
LYMPHOCYTES # BLD AUTO: 0.84 10*3/MM3 (ref 0.7–3.1)
LYMPHOCYTES NFR BLD AUTO: 15.6 % (ref 19.6–45.3)
MAXIMAL PREDICTED HEART RATE: 146 BPM
MCH RBC QN AUTO: 30.9 PG (ref 26.6–33)
MCHC RBC AUTO-ENTMCNC: 33.7 G/DL (ref 31.5–35.7)
MCV RBC AUTO: 91.8 FL (ref 79–97)
MONOCYTES # BLD AUTO: 0.55 10*3/MM3 (ref 0.1–0.9)
MONOCYTES NFR BLD AUTO: 10.2 % (ref 5–12)
NEUTROPHILS NFR BLD AUTO: 3.87 10*3/MM3 (ref 1.7–7)
NEUTROPHILS NFR BLD AUTO: 71.6 % (ref 42.7–76)
NRBC BLD AUTO-RTO: 0 /100 WBC (ref 0–0.2)
PLATELET # BLD AUTO: 215 10*3/MM3 (ref 140–450)
PMV BLD AUTO: 10 FL (ref 6–12)
POTASSIUM SERPL-SCNC: 3.9 MMOL/L (ref 3.5–5.2)
PROT SERPL-MCNC: 7.2 G/DL (ref 6–8.5)
QT INTERVAL: 358 MS
RBC # BLD AUTO: 4.27 10*6/MM3 (ref 4.14–5.8)
SINUS: 3.6 CM
SODIUM SERPL-SCNC: 140 MMOL/L (ref 136–145)
STJ: 2.9 CM
STRESS TARGET HR: 124 BPM
TROPONIN T SERPL HS-MCNC: 13 NG/L
WBC NRBC COR # BLD: 5.4 10*3/MM3 (ref 3.4–10.8)

## 2023-02-10 PROCEDURE — 85025 COMPLETE CBC W/AUTO DIFF WBC: CPT | Performed by: INTERNAL MEDICINE

## 2023-02-10 PROCEDURE — 0 GADOBENATE DIMEGLUMINE 529 MG/ML SOLUTION: Performed by: HOSPITALIST

## 2023-02-10 PROCEDURE — 74183 MRI ABD W/O CNTR FLWD CNTR: CPT

## 2023-02-10 PROCEDURE — 93306 TTE W/DOPPLER COMPLETE: CPT | Performed by: INTERNAL MEDICINE

## 2023-02-10 PROCEDURE — 83605 ASSAY OF LACTIC ACID: CPT | Performed by: INTERNAL MEDICINE

## 2023-02-10 PROCEDURE — 93306 TTE W/DOPPLER COMPLETE: CPT

## 2023-02-10 PROCEDURE — G0378 HOSPITAL OBSERVATION PER HR: HCPCS

## 2023-02-10 PROCEDURE — 80053 COMPREHEN METABOLIC PANEL: CPT | Performed by: INTERNAL MEDICINE

## 2023-02-10 PROCEDURE — 99204 OFFICE O/P NEW MOD 45 MIN: CPT | Performed by: INTERNAL MEDICINE

## 2023-02-10 PROCEDURE — 25010000002 PERFLUTREN (DEFINITY) 8.476 MG IN SODIUM CHLORIDE (PF) 0.9 % 10 ML INJECTION: Performed by: INTERNAL MEDICINE

## 2023-02-10 PROCEDURE — 84484 ASSAY OF TROPONIN QUANT: CPT | Performed by: INTERNAL MEDICINE

## 2023-02-10 PROCEDURE — 99213 OFFICE O/P EST LOW 20 MIN: CPT | Performed by: SURGERY

## 2023-02-10 PROCEDURE — A9577 INJ MULTIHANCE: HCPCS | Performed by: HOSPITALIST

## 2023-02-10 PROCEDURE — 93010 ELECTROCARDIOGRAM REPORT: CPT | Performed by: STUDENT IN AN ORGANIZED HEALTH CARE EDUCATION/TRAINING PROGRAM

## 2023-02-10 PROCEDURE — 93005 ELECTROCARDIOGRAM TRACING: CPT | Performed by: INTERNAL MEDICINE

## 2023-02-10 PROCEDURE — 96361 HYDRATE IV INFUSION ADD-ON: CPT

## 2023-02-10 PROCEDURE — 96376 TX/PRO/DX INJ SAME DRUG ADON: CPT

## 2023-02-10 RX ORDER — SODIUM CHLORIDE 9 MG/ML
75 INJECTION, SOLUTION INTRAVENOUS CONTINUOUS
Status: DISCONTINUED | OUTPATIENT
Start: 2023-02-10 | End: 2023-02-12

## 2023-02-10 RX ADMIN — GADOBENATE DIMEGLUMINE 15 ML: 529 INJECTION, SOLUTION INTRAVENOUS at 09:52

## 2023-02-10 RX ADMIN — SODIUM CHLORIDE 75 ML/HR: 9 INJECTION, SOLUTION INTRAVENOUS at 12:15

## 2023-02-10 RX ADMIN — FAMOTIDINE 20 MG: 10 INJECTION INTRAVENOUS at 19:44

## 2023-02-10 RX ADMIN — LISINOPRIL 10 MG: 10 TABLET ORAL at 08:03

## 2023-02-10 RX ADMIN — FAMOTIDINE 20 MG: 10 INJECTION INTRAVENOUS at 08:03

## 2023-02-10 RX ADMIN — PERFLUTREN 2.5 ML: 6.52 INJECTION, SUSPENSION INTRAVENOUS at 15:10

## 2023-02-10 RX ADMIN — Medication 10 ML: at 08:06

## 2023-02-10 NOTE — CONSULTS
Cc: Upper abdominal/lower chest pain, elevated liver function studies, gallstones    History of presenting illness:   This is a quite nice, reasonably healthy 72-year-old gentleman who presented to the emergency department today out of concern for possible heart disease.  He was having some pain in the upper abdomen/lower chest.  He denies any significant nausea or vomiting associated with this.  He did have a spicy meal and some grilled cheese last night and was woken up this morning with the symptoms.  He has had a couple of lesser episodes over the last few weeks which he wrote off as reflux or indigestion, but they were not this persistent or intense.  There was some slight radiation of pain into his back.  The symptoms are essentially resolved at this time.  He denies dysuria, change in the color of his urine.    Past Medical History: Hypertension, hyperlipidemia, Gilbert's disease    Past Surgical History: Denies any history of abdominal surgery, he reports having a procedure on a pilonidal cyst in the past    Medications: Lipitor, lisinopril    Allergies: None known    Social History: He is a former cigarette smoker many years ago    Family History: Negative for colon or rectal cancer    Review of Systems:  Constitutional: Denies fever, chills, change in weight  Neck: no swollen glands or dysphagia or odynophagia  Respiratory: negative for SOB, cough, hemoptysis or wheezing  Cardiovascular: negative for chest pain, palpitations or peripheral edema  Gastrointestinal: For upper abdominal pain, currently resolved, denies nausea, vomiting, change in bowel habits      Physical Exam:  BMI: 25.5  Temperature 98.6 heart rate 99 blood pressure 149/78  General: alert and oriented, appropriate, no acute distress  Eyes: No scleral icterus, extraocular movements are intact  Neck: Supple without lymphadenopathy or thyromegaly, trachea is in the midline  Respiratory: There is good bilateral chest expansion, no use of  accessory muscles is noted  Cardiovascular: No jugular venous distention or peripheral edema is seen  Gastrointestinal: Soft, there is mild epigastric tenderness without guarding, no hernia is felt    Laboratory data: White blood cell count normal at 6.1 hemoglobin 13.5 platelets normal at 260.  Total bilirubin elevated at 4.9   alkaline phosphatase 212 sodium 147 creatinine 0.97    Imaging data: Cholelithiasis and sludge and a noninflamed gallbladder.  Bile duct measured at 6 mm.      Assessment and plan:   -Probable symptomatic cholelithiasis or sustained biliary colic, clinically improved currently  -Hepatitis and elevated bilirubin, unlikely to be explained by Gilbert's disease, difficult to rule out choledocholithiasis, noted that MRCP is pending  -Cardiology evaluation pending, high-sensitivity troponin was mildly elevated  -Would recommend rechecking liver function studies in the morning, we will follow-up on MRCP.  Will eventually need cholecystectomy, likely during this admission, determination of whether or not ERCP would be needed first to be based on results of MRCP and repeat labs.  I did begin a discussion of the nature of the operation with the patient.  Would recommend making him n.p.o. after midnight.    Risks associated with the procedure are noted to include, but not be limited to, bleeding, infection, injury to intra-abdominal structures including the liver, small and large intestine, stomach, duodenum, common bile duct and major vascular structures.  Possible need for conversion to open surgery, further revisional surgery, postoperative ERCP discussed.      Garo Vizcaino MD, FACS  General, Minimally Invasive and Endoscopic Surgery  [facility]    [address], [address]  [place], [place], [address]  P: [phone]  F: [phone]

## 2023-02-10 NOTE — PROGRESS NOTES
Chief complaint: Abdominal pain, cholelithiasis, elevated liver function studies    Subjective: Feels well today, pain has resolved.  He underwent MRCP and echocardiogram today.  Was given a diet this afternoon and is tolerating it without issue.    Review of systems:  Constitutional: Denies fever, chills, change in weight  Respiratory: Denies cough or wheezing    Physical exam:  Afebrile, vital stable  General: Awake and alert, no distress  Head: Normocephalic, atraumatic  Eyes: Extraocular movements intact, no icterus  Neck: Supple, trachea midline  Respiratory: No use of accessory muscles, good bilateral chest expansion  Gastrointestinal: Soft and benign, essentially no tenderness, no guarding or rebound  Extremities: No peripheral edema, no deformity  Skin: Warm and dry    Chemistries reviewed, total bilirubin down to 3.4 from 4.9 yesterday, AST and ALT also improved at 273 and 450, alkaline phosphatase about the same.    By my read, echocardiogram appears okay, cardiology opinion still pending.    MRCP reviewed and demonstrates cholelithiasis with thickened gallbladder wall.  No evidence of choledocholithiasis is identified.      Assessment and plan:  -Symptomatic cholelithiasis  -Elevated liver function studies, possibly related to his history of Gilbert's disease, at this point there is no evidence of choledocholithiasis  -Was tentatively planning for cholecystectomy today but since he has eaten we will plan to try to get him on the schedule for tomorrow.  Likely to be done by Dr. Horn.  I again had a discussion with him regarding risks and benefits.  He is agreeable to proceed.    Garo Vizcaino MD  General and Endoscopic Surgery  [facility]    [address], [address]  [place], [place], [address]  P: [phone]  F: [phone]

## 2023-02-10 NOTE — CONSULTS
Date of Consultation: 02/10/23    Referral Provider: Dr. Bhatti    Reason for Consultation: Chest pain, elevated high-sensitivity troponin    Encounter Provider: Haile Rodriguez MD    Group of Service: [facility]     Patient Name: Guillermo Salgado    :1950    Chief complaint: Abdominal and chest discomfort    History of Present Illness:      This is a very pleasant and fairly healthy 72-year-old male with a history of hypertension, Gilbert's syndrome, and hyperlipidemia.  He presented to the emergency department with complaints of abdominal discomfort radiating into his chest which he described as pressure.  He could push in his right upper quadrant and epigastrium and reproduce the symptoms.  His initial high-sensitivity troponin was 8, which changed to 15 and then 13.  He has had no exertional symptoms prior to these episodes, and has been fairly active.  He plays golf frequently.    On arrival, his liver function tests were significantly elevated with an ALT of 575 and AST of 574.  His total bilirubin was 4.9.  He has been found to have symptomatic cholelithiasis, and is potentially being scheduled for the OR with Dr. Vizcaino.  His EKG showed nonspecific lateral ST changes.  He does not have a cardiac history.        Past Medical History:   Diagnosis Date   • Gilbert syndrome    • Hyperlipidemia    • Hypertension          Past Surgical History:   Procedure Laterality Date   • PILONIDAL CYST DRAINAGE           No Known Allergies      No current facility-administered medications on file prior to encounter.     Current Outpatient Medications on File Prior to Encounter   Medication Sig Dispense Refill   • atorvastatin (LIPITOR) 10 MG tablet Take  by mouth Daily.     • lisinopril (PRINIVIL,ZESTRIL) 10 MG tablet Take  by mouth Daily.           Social History     Socioeconomic History   • Marital status:    Tobacco Use   • Smoking status: Former     Types: Cigarettes   Vaping Use   •  "Vaping Use: Never used   Substance and Sexual Activity   • Alcohol use: Yes   • Drug use: Never         History reviewed. No pertinent family history.    REVIEW OF SYSTEMS:   Pertinent positives are noted in the HPI above.  Otherwise, all other systems were reviewed, and are negative.       Objective:     Vitals:    02/10/23 1041 02/10/23 1306 02/10/23 1510 02/10/23 1642   BP:  150/94 150/94 112/66   BP Location:  Left arm  Left arm   Patient Position:  Lying  Lying   Pulse: 96 77 80 68   Resp:  16  16   Temp: 96.9 °F (36.1 °C) 97.7 °F (36.5 °C)  98 °F (36.7 °C)   TempSrc: Oral Oral  Oral   SpO2:  100%  95%   Weight:   76 kg (167 lb 8.8 oz)    Height:   173 cm (68.11\")      Body mass index is 25.39 kg/m².  Flowsheet Rows    Flowsheet Row First Filed Value   Admission Height 172.7 cm (68\") Documented at 02/09/2023 1248   Admission Weight 77.1 kg (170 lb) Documented at 02/09/2023 1248           General:    No acute distress, alert and oriented x4, pleasant                   Head:    Normocephalic, atraumatic.   Eyes:          Conjunctivae and sclerae normal, no icterus, PERRLA   Throat:   No oral lesions, no thrush, oral mucosa moist.    Neck:   Supple, trachea midline.   Lungs:     Clear to auscultation bilaterally     Heart:    Regular rhythm and normal rate.  No murmurs, gallops, or rubs noted.   Abdomen:     Soft, mildly tender, non-distended, positive bowel sounds.    Extremities:   No clubbing, cyanosis, or edema.     Pulses:   Pulses palpable and equal bilaterally.    Skin:   No bleeding or rash.   Neuro:   Non-focal.  Moves all extremities well.    Psychiatric:   Normal mood and affect.                 Lab Review:                Results from last 7 days   Lab Units 02/10/23  0858   SODIUM mmol/L 140   POTASSIUM mmol/L 3.9   CHLORIDE mmol/L 103   CO2 mmol/L 24.5   BUN mg/dL 13   CREATININE mg/dL 0.87   GLUCOSE mg/dL 97   CALCIUM mg/dL 9.2     Results from last 7 days   Lab Units 02/10/23  0858 02/09/23  1533 " 02/09/23  1259   HSTROP T ng/L 13 15* 8     Results from last 7 days   Lab Units 02/10/23  0858   WBC 10*3/mm3 5.40   HEMOGLOBIN g/dL 13.2   HEMATOCRIT % 39.2   PLATELETS 10*3/mm3 215     Results from last 7 days   Lab Units 02/09/23  1259   INR  1.02   APTT seconds 26.0         Results from last 7 days   Lab Units 02/09/23  1259   MAGNESIUM mg/dL 2.3           EKG (reviewed by me personally):            Assessment:   1.  Symptomatic cholelithiasis   2.  Elevated liver function tests  3.  Very mildly elevated high-sensitivity troponin (not consistent with ACS)  4.  Hypertension  5.  Gilbert's disease    Plan:       His pain was mainly abdominal which then radiated into his chest.  It was pressure-like, although it was reproducible when he would push on his right upper quadrant or epigastrium.  I do not think that this is cardiac related.  His high-sensitivity troponin was very mildly elevated, and this is not consistent with acute coronary syndrome.  He does not have a history of coronary disease or congestive heart failure.  He has had no anginal symptoms prior to his presentation, including when he is walking [place] the [place].    I checked an echo this afternoon.  His ejection fraction was normal.  He had no wall motion abnormalities.  I feel that he is at low risk to proceed with surgery at this time.  I explained to the patient and his wife that this does not mean no risk, although it is low and acceptable.  His overall risk would be at approximately 3%.  I would proceed as planned from a cardiology standpoint.    Thank you very much for this consult.    Jimmy Rodriguez MD

## 2023-02-10 NOTE — PROGRESS NOTES
Name: Guillermo Salgado ADMIT: 2023   : 1950  PCP: Radha Jacobson MD    MRN: [id number] LOS: 0 days   AGE/SEX: 72 y.o. male  ROOM: [facility]/1     Subjective   Subjective   Feeling fine today. No more chest pain. No N/V. Voiding well. Denies any SOA or palp.     Review of Systems   as above    Objective   Objective   Vital Signs  Temp:  [96.9 °F (36.1 °C)-98.7 °F (37.1 °C)] 96.9 °F (36.1 °C)  Heart Rate:  [78-99] 96  Resp:  [14-18] 16  BP: (127-150)/(76-89) 137/89  SpO2:  [98 %-99 %] 98 %  on   ;   Device (Oxygen Therapy): room air  Body mass index is 25.54 kg/m².  Physical Exam  Vitals and nursing note reviewed. Exam conducted with a chaperone present (Family x 2).   Constitutional:       General: He is not in acute distress.     Appearance: He is not ill-appearing, toxic-appearing or diaphoretic.   HENT:      Head: Normocephalic.      Right Ear: External ear normal.      Left Ear: External ear normal.      Nose: Nose normal.      Mouth/Throat:      Mouth: Mucous membranes are moist.      Pharynx: Oropharynx is clear.   Eyes:      General: No scleral icterus.        Right eye: No discharge.         Left eye: No discharge.      Extraocular Movements: Extraocular movements intact.      Conjunctiva/sclera: Conjunctivae normal.   Cardiovascular:      Rate and Rhythm: Normal rate and regular rhythm.      Pulses: Normal pulses.   Pulmonary:      Effort: Pulmonary effort is normal. No respiratory distress.      Breath sounds: Normal breath sounds. No wheezing or rales.   Chest:      Chest wall: No tenderness.   Abdominal:      General: Bowel sounds are normal. There is no distension.      Palpations: Abdomen is soft.      Tenderness: There is no abdominal tenderness.   Musculoskeletal:         General: No swelling or deformity. Normal range of motion.      Cervical back: Neck supple. No rigidity.   Lymphadenopathy:      Cervical: No cervical adenopathy.   Skin:     General: Skin is warm and dry.      Capillary  Refill: Capillary refill takes less than 2 seconds.      Coloration: Skin is not jaundiced.      Findings: No rash.   Neurological:      General: No focal deficit present.      Mental Status: He is oriented to person, place, and time. Mental status is at baseline.      Cranial Nerves: No cranial nerve deficit.      Coordination: Coordination normal.   Psychiatric:         Mood and Affect: Mood normal.         Behavior: Behavior normal.         Thought Content: Thought content normal.       Results Review     I reviewed the patient's new clinical results.  Results from last 7 days   Lab Units 02/10/23  0858 02/09/23  1259   WBC 10*3/mm3 5.40 6.09   HEMOGLOBIN g/dL 13.2 13.5   PLATELETS 10*3/mm3 215 260     Results from last 7 days   Lab Units 02/10/23  0858 02/09/23  1259   SODIUM mmol/L 140 147*   POTASSIUM mmol/L 3.9 4.6   CHLORIDE mmol/L 103 105   CO2 mmol/L 24.5 29.0   BUN mg/dL 13 14   CREATININE mg/dL 0.87 0.97   GLUCOSE mg/dL 97 126*   EGFR mL/min/1.73 90.5 81.9     Results from last 7 days   Lab Units 02/10/23  0858 02/09/23  1259   ALBUMIN g/dL 4.2 4.7   BILIRUBIN mg/dL 3.4* 4.9*   ALK PHOS U/L 218* 212*   AST (SGOT) U/L 273* 574*   ALT (SGPT) U/L 450* 575*     Results from last 7 days   Lab Units 02/10/23  0858 02/09/23  1259   CALCIUM mg/dL 9.2 10.1   ALBUMIN g/dL 4.2 4.7   MAGNESIUM mg/dL  --  2.3     Results from last 7 days   Lab Units 02/10/23  0858   LACTATE mmol/L 1.0     No results found for: HGBA1C, POCGLU    XR Chest 2 View    Result Date: 2/9/2023  No evidence for acute pulmonary process. Follow-up as clinical indications persist.  This report was finalized on 2/9/2023 1:59 PM by Dr. Mahendra Sigala M.D.      US Gallbladder    Result Date: 2/9/2023  1. Cholelithiasis and gallbladder sludge, without sonographic evidence of acute cholecystitis 2. Hepatic cysts. No suspicious appearing hepatic lesion seen.  This report was finalized on 2/9/2023 3:18 PM by Dr. Delon Membreno M.D.      MRI abdomen w  wo contrast mrcp    Result Date: 2/10/2023  1.  Cholelithiasis with thickened gallbladder wall. Correlate with history and physical exam. If there is clinical concern for acute cholecystitis, nuclear medicine scanning can be performed. 2. No choledocholithiasis seen.  This report was finalized on 2/10/2023 10:14 AM by Dr. Delon Membreno M.D.      I have personally reviewed all medications:  Scheduled Medications  famotidine, 20 mg, Intravenous, Q12H  lisinopril, 10 mg, Oral, Daily  sodium chloride, 10 mL, Intravenous, Q12H    Infusions   Diet  NPO Diet NPO Type: Sips with Meds    I have personally reviewed:  [x]  Laboratory   []  Microbiology   [x]  Radiology   [x]  EKG/Telemetry  []  Cardiology/Vascular   []  Pathology    [x]  Records       Assessment/Plan     Active Hospital Problems    Diagnosis  POA   • **Chest pain, unspecified type [R07.9]  Yes   • Hyperlipidemia [E78.5]  Yes   • Gilbert syndrome [E80.4]  Yes   • Abnormal LFTs [R79.89]  Yes   • Cholelithiasis [K80.20]  Yes   • Elevated troponin [R77.8]  Yes   • Hypertension [I10]  Yes      Resolved Hospital Problems   No resolved problems to display.         73yo gentleman with h/o HTN, Gilbert's syndrome, and HLD, who presented to ER with c/o several weeks of post-prandial chest discomfort sometimes associated with nausea. In ER his LFTs were elevated in obstructive pattern, trop neg, CBC wnl, CXR NAD, and EKG was neg for ischemia. Repeat trop was slightly elevated.  U/S showed cholelithiasis and sludge but no acute cholecystitis. He was admitted to our service, Card and Surg were consulted, and MRCP was ordered.    Chest Pain NOS  Elevated Trop: CXR neg, Card consulted, suspect pain due more to GI issues (see below), Trop only mildly elevated and EKG pretty unremarkable    Elevated LFTs  Abnl GB imaging: MRCP showed edema of GB wall--no evidence of CBD stone, await Surg eval/recs today. Dr. Vizcaino feels pt will likely require cholecystectomy this  admission. LFTs falling this AM.    HLD: statin on hold given elevated LFTs    HTN: BPs acceptable on Lisinopril      · SCDs for DVT prophylaxis.  · Full code.  · Discussed with patient, wife, and family x 1. D/w RN.  · Anticipate discharge TBD        Jaswant Bhatti MD  [facility]  02/10/23  10:44 EST

## 2023-02-11 ENCOUNTER — ANESTHESIA EVENT (OUTPATIENT)
Dept: PERIOP | Facility: HOSPITAL | Age: 73
End: 2023-02-11
Payer: MEDICARE

## 2023-02-11 ENCOUNTER — APPOINTMENT (OUTPATIENT)
Dept: GENERAL RADIOLOGY | Facility: HOSPITAL | Age: 73
End: 2023-02-11
Payer: MEDICARE

## 2023-02-11 ENCOUNTER — ANESTHESIA (OUTPATIENT)
Dept: PERIOP | Facility: HOSPITAL | Age: 73
End: 2023-02-11
Payer: MEDICARE

## 2023-02-11 PROBLEM — R07.9 CHEST PAIN, UNSPECIFIED TYPE: Status: RESOLVED | Noted: 2023-02-09 | Resolved: 2023-02-11

## 2023-02-11 LAB
ALBUMIN SERPL-MCNC: 3.8 G/DL (ref 3.5–5.2)
ALBUMIN/GLOB SERPL: 1.3 G/DL
ALP SERPL-CCNC: 173 U/L (ref 39–117)
ALT SERPL W P-5'-P-CCNC: 291 U/L (ref 1–41)
ANION GAP SERPL CALCULATED.3IONS-SCNC: 8.7 MMOL/L (ref 5–15)
AST SERPL-CCNC: 129 U/L (ref 1–40)
BILIRUB SERPL-MCNC: 2.1 MG/DL (ref 0–1.2)
BUN SERPL-MCNC: 14 MG/DL (ref 8–23)
BUN/CREAT SERPL: 16.9 (ref 7–25)
CALCIUM SPEC-SCNC: 8.8 MG/DL (ref 8.6–10.5)
CHLORIDE SERPL-SCNC: 106 MMOL/L (ref 98–107)
CO2 SERPL-SCNC: 27.3 MMOL/L (ref 22–29)
CREAT SERPL-MCNC: 0.83 MG/DL (ref 0.76–1.27)
DEPRECATED RDW RBC AUTO: 43.6 FL (ref 37–54)
EGFRCR SERPLBLD CKD-EPI 2021: 91.8 ML/MIN/1.73
ERYTHROCYTE [DISTWIDTH] IN BLOOD BY AUTOMATED COUNT: 12.7 % (ref 12.3–15.4)
GLOBULIN UR ELPH-MCNC: 2.9 GM/DL
GLUCOSE SERPL-MCNC: 100 MG/DL (ref 65–99)
HCT VFR BLD AUTO: 37.9 % (ref 37.5–51)
HGB BLD-MCNC: 12.6 G/DL (ref 13–17.7)
MAGNESIUM SERPL-MCNC: 2 MG/DL (ref 1.6–2.4)
MCH RBC QN AUTO: 31 PG (ref 26.6–33)
MCHC RBC AUTO-ENTMCNC: 33.2 G/DL (ref 31.5–35.7)
MCV RBC AUTO: 93.3 FL (ref 79–97)
PLATELET # BLD AUTO: 195 10*3/MM3 (ref 140–450)
PMV BLD AUTO: 10.1 FL (ref 6–12)
POTASSIUM SERPL-SCNC: 4.1 MMOL/L (ref 3.5–5.2)
PROT SERPL-MCNC: 6.7 G/DL (ref 6–8.5)
RBC # BLD AUTO: 4.06 10*6/MM3 (ref 4.14–5.8)
SODIUM SERPL-SCNC: 142 MMOL/L (ref 136–145)
WBC NRBC COR # BLD: 6.21 10*3/MM3 (ref 3.4–10.8)

## 2023-02-11 PROCEDURE — 74300 X-RAY BILE DUCTS/PANCREAS: CPT

## 2023-02-11 PROCEDURE — A9270 NON-COVERED ITEM OR SERVICE: HCPCS | Performed by: STUDENT IN AN ORGANIZED HEALTH CARE EDUCATION/TRAINING PROGRAM

## 2023-02-11 PROCEDURE — 63710000001 LISINOPRIL 10 MG TABLET: Performed by: STUDENT IN AN ORGANIZED HEALTH CARE EDUCATION/TRAINING PROGRAM

## 2023-02-11 PROCEDURE — G0378 HOSPITAL OBSERVATION PER HR: HCPCS

## 2023-02-11 PROCEDURE — 88304 TISSUE EXAM BY PATHOLOGIST: CPT | Performed by: STUDENT IN AN ORGANIZED HEALTH CARE EDUCATION/TRAINING PROGRAM

## 2023-02-11 PROCEDURE — 25010000002 ONDANSETRON PER 1 MG: Performed by: ANESTHESIOLOGY

## 2023-02-11 PROCEDURE — 99214 OFFICE O/P EST MOD 30 MIN: CPT | Performed by: NURSE PRACTITIONER

## 2023-02-11 PROCEDURE — 25010000002 MORPHINE PER 10 MG: Performed by: STUDENT IN AN ORGANIZED HEALTH CARE EDUCATION/TRAINING PROGRAM

## 2023-02-11 PROCEDURE — 96376 TX/PRO/DX INJ SAME DRUG ADON: CPT

## 2023-02-11 PROCEDURE — 80053 COMPREHEN METABOLIC PANEL: CPT | Performed by: HOSPITALIST

## 2023-02-11 PROCEDURE — 25010000002 KETOROLAC TROMETHAMINE PER 15 MG: Performed by: ANESTHESIOLOGY

## 2023-02-11 PROCEDURE — 47563 LAPARO CHOLECYSTECTOMY/GRAPH: CPT

## 2023-02-11 PROCEDURE — 25010000002 CEFAZOLIN IN DEXTROSE 2-4 GM/100ML-% SOLUTION: Performed by: STUDENT IN AN ORGANIZED HEALTH CARE EDUCATION/TRAINING PROGRAM

## 2023-02-11 PROCEDURE — 25010000002 FENTANYL CITRATE (PF) 50 MCG/ML SOLUTION: Performed by: ANESTHESIOLOGY

## 2023-02-11 PROCEDURE — 83735 ASSAY OF MAGNESIUM: CPT | Performed by: HOSPITALIST

## 2023-02-11 PROCEDURE — 25010000002 HYDROMORPHONE 1 MG/ML SOLUTION: Performed by: ANESTHESIOLOGY

## 2023-02-11 PROCEDURE — A9270 NON-COVERED ITEM OR SERVICE: HCPCS | Performed by: HOSPITALIST

## 2023-02-11 PROCEDURE — 25010000002 PROPOFOL 10 MG/ML EMULSION: Performed by: ANESTHESIOLOGY

## 2023-02-11 PROCEDURE — 96361 HYDRATE IV INFUSION ADD-ON: CPT

## 2023-02-11 PROCEDURE — 25010000002 IOPAMIDOL 61 % SOLUTION: Performed by: STUDENT IN AN ORGANIZED HEALTH CARE EDUCATION/TRAINING PROGRAM

## 2023-02-11 PROCEDURE — 47563 LAPARO CHOLECYSTECTOMY/GRAPH: CPT | Performed by: STUDENT IN AN ORGANIZED HEALTH CARE EDUCATION/TRAINING PROGRAM

## 2023-02-11 PROCEDURE — 63710000001 POLYETHYLENE GLYCOL 17 G PACK: Performed by: STUDENT IN AN ORGANIZED HEALTH CARE EDUCATION/TRAINING PROGRAM

## 2023-02-11 PROCEDURE — 85027 COMPLETE CBC AUTOMATED: CPT | Performed by: HOSPITALIST

## 2023-02-11 PROCEDURE — 63710000001 HYDROCODONE-ACETAMINOPHEN 5-325 MG TABLET: Performed by: HOSPITALIST

## 2023-02-11 DEVICE — CLIP LIGAT VASC HORIZON TI MD/LG GRN 6CT: Type: IMPLANTABLE DEVICE | Site: ABDOMEN | Status: FUNCTIONAL

## 2023-02-11 RX ORDER — PROMETHAZINE HYDROCHLORIDE 25 MG/1
25 SUPPOSITORY RECTAL ONCE AS NEEDED
Status: DISCONTINUED | OUTPATIENT
Start: 2023-02-11 | End: 2023-02-11 | Stop reason: HOSPADM

## 2023-02-11 RX ORDER — SODIUM CHLORIDE, SODIUM LACTATE, POTASSIUM CHLORIDE, CALCIUM CHLORIDE 600; 310; 30; 20 MG/100ML; MG/100ML; MG/100ML; MG/100ML
9 INJECTION, SOLUTION INTRAVENOUS CONTINUOUS
Status: DISCONTINUED | OUTPATIENT
Start: 2023-02-11 | End: 2023-02-12 | Stop reason: HOSPADM

## 2023-02-11 RX ORDER — HYDROMORPHONE HYDROCHLORIDE 1 MG/ML
0.5 INJECTION, SOLUTION INTRAMUSCULAR; INTRAVENOUS; SUBCUTANEOUS
Status: DISCONTINUED | OUTPATIENT
Start: 2023-02-11 | End: 2023-02-11 | Stop reason: HOSPADM

## 2023-02-11 RX ORDER — BUPIVACAINE HYDROCHLORIDE AND EPINEPHRINE 5; 5 MG/ML; UG/ML
INJECTION, SOLUTION EPIDURAL; INTRACAUDAL; PERINEURAL AS NEEDED
Status: DISCONTINUED | OUTPATIENT
Start: 2023-02-11 | End: 2023-02-11 | Stop reason: HOSPADM

## 2023-02-11 RX ORDER — KETOROLAC TROMETHAMINE 30 MG/ML
INJECTION, SOLUTION INTRAMUSCULAR; INTRAVENOUS AS NEEDED
Status: DISCONTINUED | OUTPATIENT
Start: 2023-02-11 | End: 2023-02-11 | Stop reason: SURG

## 2023-02-11 RX ORDER — LIDOCAINE HYDROCHLORIDE 10 MG/ML
0.5 INJECTION, SOLUTION EPIDURAL; INFILTRATION; INTRACAUDAL; PERINEURAL ONCE AS NEEDED
Status: DISCONTINUED | OUTPATIENT
Start: 2023-02-11 | End: 2023-02-11 | Stop reason: HOSPADM

## 2023-02-11 RX ORDER — PROPOFOL 10 MG/ML
VIAL (ML) INTRAVENOUS AS NEEDED
Status: DISCONTINUED | OUTPATIENT
Start: 2023-02-11 | End: 2023-02-11 | Stop reason: SURG

## 2023-02-11 RX ORDER — PROMETHAZINE HYDROCHLORIDE 25 MG/1
25 TABLET ORAL ONCE AS NEEDED
Status: DISCONTINUED | OUTPATIENT
Start: 2023-02-11 | End: 2023-02-11 | Stop reason: HOSPADM

## 2023-02-11 RX ORDER — MAGNESIUM HYDROXIDE 1200 MG/15ML
LIQUID ORAL AS NEEDED
Status: DISCONTINUED | OUTPATIENT
Start: 2023-02-11 | End: 2023-02-11 | Stop reason: HOSPADM

## 2023-02-11 RX ORDER — SODIUM CHLORIDE 0.9 % (FLUSH) 0.9 %
3-10 SYRINGE (ML) INJECTION AS NEEDED
Status: DISCONTINUED | OUTPATIENT
Start: 2023-02-11 | End: 2023-02-11 | Stop reason: HOSPADM

## 2023-02-11 RX ORDER — MIDAZOLAM HYDROCHLORIDE 1 MG/ML
0.5 INJECTION INTRAMUSCULAR; INTRAVENOUS
Status: DISCONTINUED | OUTPATIENT
Start: 2023-02-11 | End: 2023-02-11 | Stop reason: HOSPADM

## 2023-02-11 RX ORDER — NALOXONE HCL 0.4 MG/ML
0.2 VIAL (ML) INJECTION AS NEEDED
Status: DISCONTINUED | OUTPATIENT
Start: 2023-02-11 | End: 2023-02-11 | Stop reason: HOSPADM

## 2023-02-11 RX ORDER — SODIUM CHLORIDE 9 MG/ML
40 INJECTION, SOLUTION INTRAVENOUS AS NEEDED
Status: DISCONTINUED | OUTPATIENT
Start: 2023-02-11 | End: 2023-02-11 | Stop reason: HOSPADM

## 2023-02-11 RX ORDER — SODIUM CHLORIDE 9 MG/ML
INJECTION, SOLUTION INTRAVENOUS AS NEEDED
Status: DISCONTINUED | OUTPATIENT
Start: 2023-02-11 | End: 2023-02-11 | Stop reason: HOSPADM

## 2023-02-11 RX ORDER — CEFAZOLIN SODIUM 2 G/100ML
2 INJECTION, SOLUTION INTRAVENOUS ONCE
Status: DISCONTINUED | OUTPATIENT
Start: 2023-02-11 | End: 2023-02-11 | Stop reason: HOSPADM

## 2023-02-11 RX ORDER — SODIUM CHLORIDE 0.9 % (FLUSH) 0.9 %
10 SYRINGE (ML) INJECTION EVERY 12 HOURS SCHEDULED
Status: DISCONTINUED | OUTPATIENT
Start: 2023-02-11 | End: 2023-02-11 | Stop reason: HOSPADM

## 2023-02-11 RX ORDER — POLYETHYLENE GLYCOL 3350 17 G/17G
17 POWDER, FOR SOLUTION ORAL DAILY
Status: DISCONTINUED | OUTPATIENT
Start: 2023-02-11 | End: 2023-02-12 | Stop reason: HOSPADM

## 2023-02-11 RX ORDER — EPHEDRINE SULFATE 50 MG/ML
5 INJECTION, SOLUTION INTRAVENOUS ONCE AS NEEDED
Status: DISCONTINUED | OUTPATIENT
Start: 2023-02-11 | End: 2023-02-11 | Stop reason: HOSPADM

## 2023-02-11 RX ORDER — DIPHENHYDRAMINE HCL 25 MG
25 CAPSULE ORAL
Status: DISCONTINUED | OUTPATIENT
Start: 2023-02-11 | End: 2023-02-11 | Stop reason: HOSPADM

## 2023-02-11 RX ORDER — HYDROCODONE BITARTRATE AND ACETAMINOPHEN 5; 325 MG/1; MG/1
1 TABLET ORAL EVERY 6 HOURS PRN
Status: DISCONTINUED | OUTPATIENT
Start: 2023-02-11 | End: 2023-02-12 | Stop reason: HOSPADM

## 2023-02-11 RX ORDER — SODIUM CHLORIDE 0.9 % (FLUSH) 0.9 %
3 SYRINGE (ML) INJECTION EVERY 12 HOURS SCHEDULED
Status: DISCONTINUED | OUTPATIENT
Start: 2023-02-11 | End: 2023-02-11 | Stop reason: HOSPADM

## 2023-02-11 RX ORDER — FENTANYL CITRATE 50 UG/ML
50 INJECTION, SOLUTION INTRAMUSCULAR; INTRAVENOUS
Status: DISCONTINUED | OUTPATIENT
Start: 2023-02-11 | End: 2023-02-11 | Stop reason: HOSPADM

## 2023-02-11 RX ORDER — ROCURONIUM BROMIDE 10 MG/ML
INJECTION, SOLUTION INTRAVENOUS AS NEEDED
Status: DISCONTINUED | OUTPATIENT
Start: 2023-02-11 | End: 2023-02-11 | Stop reason: SURG

## 2023-02-11 RX ORDER — HYDRALAZINE HYDROCHLORIDE 20 MG/ML
5 INJECTION INTRAMUSCULAR; INTRAVENOUS
Status: DISCONTINUED | OUTPATIENT
Start: 2023-02-11 | End: 2023-02-11 | Stop reason: HOSPADM

## 2023-02-11 RX ORDER — FLUMAZENIL 0.1 MG/ML
0.2 INJECTION INTRAVENOUS AS NEEDED
Status: DISCONTINUED | OUTPATIENT
Start: 2023-02-11 | End: 2023-02-11 | Stop reason: HOSPADM

## 2023-02-11 RX ORDER — LABETALOL HYDROCHLORIDE 5 MG/ML
5 INJECTION, SOLUTION INTRAVENOUS
Status: DISCONTINUED | OUTPATIENT
Start: 2023-02-11 | End: 2023-02-11 | Stop reason: HOSPADM

## 2023-02-11 RX ORDER — FAMOTIDINE 10 MG/ML
20 INJECTION, SOLUTION INTRAVENOUS ONCE
Status: DISCONTINUED | OUTPATIENT
Start: 2023-02-11 | End: 2023-02-11 | Stop reason: HOSPADM

## 2023-02-11 RX ORDER — HYDROCODONE BITARTRATE AND ACETAMINOPHEN 7.5; 325 MG/1; MG/1
1 TABLET ORAL ONCE AS NEEDED
Status: DISCONTINUED | OUTPATIENT
Start: 2023-02-11 | End: 2023-02-11 | Stop reason: HOSPADM

## 2023-02-11 RX ORDER — OXYCODONE AND ACETAMINOPHEN 7.5; 325 MG/1; MG/1
1 TABLET ORAL EVERY 4 HOURS PRN
Status: DISCONTINUED | OUTPATIENT
Start: 2023-02-11 | End: 2023-02-11 | Stop reason: HOSPADM

## 2023-02-11 RX ORDER — FENTANYL CITRATE 50 UG/ML
INJECTION, SOLUTION INTRAMUSCULAR; INTRAVENOUS AS NEEDED
Status: DISCONTINUED | OUTPATIENT
Start: 2023-02-11 | End: 2023-02-11 | Stop reason: SURG

## 2023-02-11 RX ORDER — LIDOCAINE HYDROCHLORIDE 20 MG/ML
INJECTION, SOLUTION INFILTRATION; PERINEURAL AS NEEDED
Status: DISCONTINUED | OUTPATIENT
Start: 2023-02-11 | End: 2023-02-11 | Stop reason: SURG

## 2023-02-11 RX ORDER — ONDANSETRON 2 MG/ML
4 INJECTION INTRAMUSCULAR; INTRAVENOUS ONCE AS NEEDED
Status: DISCONTINUED | OUTPATIENT
Start: 2023-02-11 | End: 2023-02-11 | Stop reason: HOSPADM

## 2023-02-11 RX ORDER — SODIUM CHLORIDE 0.9 % (FLUSH) 0.9 %
10 SYRINGE (ML) INJECTION AS NEEDED
Status: DISCONTINUED | OUTPATIENT
Start: 2023-02-11 | End: 2023-02-11 | Stop reason: HOSPADM

## 2023-02-11 RX ORDER — CEFAZOLIN SODIUM 2 G/100ML
2 INJECTION, SOLUTION INTRAVENOUS ONCE
Status: COMPLETED | OUTPATIENT
Start: 2023-02-11 | End: 2023-02-11

## 2023-02-11 RX ORDER — DIPHENHYDRAMINE HYDROCHLORIDE 50 MG/ML
12.5 INJECTION INTRAMUSCULAR; INTRAVENOUS
Status: DISCONTINUED | OUTPATIENT
Start: 2023-02-11 | End: 2023-02-11 | Stop reason: HOSPADM

## 2023-02-11 RX ORDER — ONDANSETRON 2 MG/ML
INJECTION INTRAMUSCULAR; INTRAVENOUS AS NEEDED
Status: DISCONTINUED | OUTPATIENT
Start: 2023-02-11 | End: 2023-02-11 | Stop reason: SURG

## 2023-02-11 RX ADMIN — PROPOFOL 150 MG: 10 INJECTION, EMULSION INTRAVENOUS at 12:50

## 2023-02-11 RX ADMIN — FAMOTIDINE 20 MG: 10 INJECTION INTRAVENOUS at 20:01

## 2023-02-11 RX ADMIN — HYDROCODONE BITARTRATE AND ACETAMINOPHEN 1 TABLET: 5; 325 TABLET ORAL at 22:37

## 2023-02-11 RX ADMIN — MORPHINE SULFATE 1 MG: 2 INJECTION, SOLUTION INTRAMUSCULAR; INTRAVENOUS at 20:00

## 2023-02-11 RX ADMIN — MORPHINE SULFATE 1 MG: 2 INJECTION, SOLUTION INTRAMUSCULAR; INTRAVENOUS at 15:24

## 2023-02-11 RX ADMIN — LISINOPRIL 10 MG: 10 TABLET ORAL at 16:41

## 2023-02-11 RX ADMIN — HYDROMORPHONE HYDROCHLORIDE 1 MG: 1 INJECTION, SOLUTION INTRAMUSCULAR; INTRAVENOUS; SUBCUTANEOUS at 13:16

## 2023-02-11 RX ADMIN — POLYETHYLENE GLYCOL 3350 17 G: 17 POWDER, FOR SOLUTION ORAL at 16:41

## 2023-02-11 RX ADMIN — SODIUM CHLORIDE 75 ML/HR: 9 INJECTION, SOLUTION INTRAVENOUS at 17:46

## 2023-02-11 RX ADMIN — FAMOTIDINE 20 MG: 10 INJECTION INTRAVENOUS at 08:05

## 2023-02-11 RX ADMIN — KETOROLAC TROMETHAMINE 30 MG: 30 INJECTION, SOLUTION INTRAMUSCULAR; INTRAVENOUS at 13:39

## 2023-02-11 RX ADMIN — SODIUM CHLORIDE, POTASSIUM CHLORIDE, SODIUM LACTATE AND CALCIUM CHLORIDE 9 ML/HR: 600; 310; 30; 20 INJECTION, SOLUTION INTRAVENOUS at 11:45

## 2023-02-11 RX ADMIN — LIDOCAINE HYDROCHLORIDE 80 MG: 20 INJECTION, SOLUTION INFILTRATION; PERINEURAL at 12:50

## 2023-02-11 RX ADMIN — SODIUM CHLORIDE 75 ML/HR: 9 INJECTION, SOLUTION INTRAVENOUS at 02:36

## 2023-02-11 RX ADMIN — SODIUM CHLORIDE: 9 INJECTION, SOLUTION INTRAVENOUS at 14:11

## 2023-02-11 RX ADMIN — Medication 10 ML: at 20:01

## 2023-02-11 RX ADMIN — ROCURONIUM BROMIDE 50 MG: 10 INJECTION, SOLUTION INTRAVENOUS at 12:50

## 2023-02-11 RX ADMIN — ONDANSETRON 4 MG: 2 INJECTION INTRAMUSCULAR; INTRAVENOUS at 13:40

## 2023-02-11 RX ADMIN — SUGAMMADEX 200 MG: 100 INJECTION, SOLUTION INTRAVENOUS at 14:11

## 2023-02-11 RX ADMIN — FENTANYL CITRATE 50 MCG: 0.05 INJECTION, SOLUTION INTRAMUSCULAR; INTRAVENOUS at 12:54

## 2023-02-11 RX ADMIN — HYDROCODONE BITARTRATE AND ACETAMINOPHEN 1 TABLET: 5; 325 TABLET ORAL at 16:41

## 2023-02-11 RX ADMIN — CEFAZOLIN SODIUM 2 G: 2 INJECTION, SOLUTION INTRAVENOUS at 12:40

## 2023-02-11 NOTE — PROGRESS NOTES
"    Patient Name: Guillermo Salgado  :1950  72 y.o.      Patient Care Team:  Radha Jacobson MD as PCP - General (Family Medicine)    Chief Complaint: abdominal pain and chest discomfort    Interval History:   He had lap mann today; he was told gallbladder looked inflamed. He is having lower abdominal pain and right flank pain now. No chest pain or dyspnea.     Objective   Vital Signs  Temp:  [97.7 °F (36.5 °C)-98.4 °F (36.9 °C)] 97.7 °F (36.5 °C)  Heart Rate:  [57-97] 95  Resp:  [16-22] 20  BP: (116-153)/(72-90) 150/72    Intake/Output Summary (Last 24 hours) at 2023 1739  Last data filed at 2023 1430  Gross per 24 hour   Intake 1515 ml   Output --   Net 1515 ml     Flowsheet Rows    Flowsheet Row First Filed Value   Admission Height 172.7 cm (68\") Documented at 2023 1248   Admission Weight 77.1 kg (170 lb) Documented at 2023 1248          Physical Exam:   General Appearance:    Alert, cooperative, appears uncomfortable.   Lungs:     Clear to auscultation.  Normal respiratory effort and rate.      Heart:    Regular rhythm and normal rate, normal S1 and S2, no murmurs, gallops or rubs.     Chest Wall:    No abnormalities observed   Abdomen:     Soft, nontender, positive bowel sounds.     Extremities:   no cyanosis, clubbing or edema.  No marked joint deformities.  Adequate musculoskeletal strength.       Results Review:    Results from last 7 days   Lab Units 23  0536   SODIUM mmol/L 142   POTASSIUM mmol/L 4.1   CHLORIDE mmol/L 106   CO2 mmol/L 27.3   BUN mg/dL 14   CREATININE mg/dL 0.83   GLUCOSE mg/dL 100*   CALCIUM mg/dL 8.8     Results from last 7 days   Lab Units 02/10/23  0858 23  1533 23  1259   HSTROP T ng/L 13 15* 8     Results from last 7 days   Lab Units 23  0536   WBC 10*3/mm3 6.21   HEMOGLOBIN g/dL 12.6*   HEMATOCRIT % 37.9   PLATELETS 10*3/mm3 195     Results from last 7 days   Lab Units 23  1259   INR  1.02   APTT seconds 26.0     Results from " last 7 days   Lab Units 02/11/23  0536   MAGNESIUM mg/dL 2.0                   Medication Review:   famotidine, 20 mg, Intravenous, Q12H  lisinopril, 10 mg, Oral, Daily  polyethylene glycol, 17 g, Oral, Daily  sodium chloride, 10 mL, Intravenous, Q12H         lactated ringers, 9 mL/hr, Last Rate: 9 mL/hr (02/11/23 1145)  sodium chloride, 75 mL/hr, Last Rate: 75 mL/hr (02/11/23 1245)        Assessment & Plan   1.  Symptomatic cholelithiasis - 2/11/23 laparoscopic cholecystectomy  2.  Elevated liver function tests - trending down  3.  Very mildly elevated high-sensitivity troponin (not consistent with ACS)  4.  Hypertension - a little elevated, but this may be related to pain.  5.  Gilbert's disease    He had laparoscopic cholecystectomy today and has abdominal pain but no chest pain. BP likely elevated due to pain. HS troponin initially elevated but trended down; not suggestive of ACS. If BP continues to be elevated after pain controlled, would increase lisinopril. We will see as needed.     TY Fox  [facility]  02/11/23  17:39 EST

## 2023-02-11 NOTE — PROGRESS NOTES
Name: Guillermo Salgado ADMIT: 2023   : 1950  PCP: Radha Jacobson MD    MRN: [id number] LOS: 0 days   AGE/SEX: 72 y.o. male  ROOM: [facility]/     Subjective   Subjective   Feeling fine again today. No more chest pain. No N/V. Voiding well. Denies any SOA or palp. NPO for OR later.    Review of Systems     as above    Objective   Objective   Vital Signs  Temp:  [96.9 °F (36.1 °C)-98.3 °F (36.8 °C)] 98.3 °F (36.8 °C)  Heart Rate:  [57-96] 86  Resp:  [16-18] 18  BP: (112-150)/(66-94) 116/82  SpO2:  [95 %-100 %] 99 %  on   ;   Device (Oxygen Therapy): room air  Body mass index is 25.39 kg/m².     (No change in exam today)    Physical Exam  Vitals and nursing note reviewed. Exam conducted with a chaperone present (Family x 2).   Constitutional:       General: He is not in acute distress.     Appearance: He is not ill-appearing, toxic-appearing or diaphoretic.   HENT:      Head: Normocephalic.      Right Ear: External ear normal.      Left Ear: External ear normal.      Nose: Nose normal.      Mouth/Throat:      Mouth: Mucous membranes are moist.      Pharynx: Oropharynx is clear.   Eyes:      General: No scleral icterus.        Right eye: No discharge.         Left eye: No discharge.      Extraocular Movements: Extraocular movements intact.      Conjunctiva/sclera: Conjunctivae normal.   Cardiovascular:      Rate and Rhythm: Normal rate and regular rhythm.      Pulses: Normal pulses.   Pulmonary:      Effort: Pulmonary effort is normal. No respiratory distress.      Breath sounds: Normal breath sounds. No wheezing or rales.   Chest:      Chest wall: No tenderness.   Abdominal:      General: Bowel sounds are normal. There is no distension.      Palpations: Abdomen is soft.      Tenderness: There is no abdominal tenderness.   Musculoskeletal:         General: No swelling or deformity. Normal range of motion.      Cervical back: Neck supple. No rigidity.   Lymphadenopathy:      Cervical: No cervical adenopathy.    Skin:     General: Skin is warm and dry.      Capillary Refill: Capillary refill takes less than 2 seconds.      Coloration: Skin is not jaundiced.      Findings: No rash.   Neurological:      General: No focal deficit present.      Mental Status: He is oriented to person, place, and time. Mental status is at baseline.      Cranial Nerves: No cranial nerve deficit.      Coordination: Coordination normal.   Psychiatric:         Mood and Affect: Mood normal.         Behavior: Behavior normal.         Thought Content: Thought content normal.       Results Review     I reviewed the patient's new clinical results.  Results from last 7 days   Lab Units 02/11/23 0536 02/10/23  0858 02/09/23  1259   WBC 10*3/mm3 6.21 5.40 6.09   HEMOGLOBIN g/dL 12.6* 13.2 13.5   PLATELETS 10*3/mm3 195 215 260     Results from last 7 days   Lab Units 02/11/23 0536 02/10/23  0858 02/09/23  1259   SODIUM mmol/L 142 140 147*   POTASSIUM mmol/L 4.1 3.9 4.6   CHLORIDE mmol/L 106 103 105   CO2 mmol/L 27.3 24.5 29.0   BUN mg/dL 14 13 14   CREATININE mg/dL 0.83 0.87 0.97   GLUCOSE mg/dL 100* 97 126*   EGFR mL/min/1.73 91.8 90.5 81.9     Results from last 7 days   Lab Units 02/11/23  0536 02/10/23  0858 02/09/23  1259   ALBUMIN g/dL 3.8 4.2 4.7   BILIRUBIN mg/dL 2.1* 3.4* 4.9*   ALK PHOS U/L 173* 218* 212*   AST (SGOT) U/L 129* 273* 574*   ALT (SGPT) U/L 291* 450* 575*     Results from last 7 days   Lab Units 02/11/23  0536 02/10/23  0858 02/09/23  1259   CALCIUM mg/dL 8.8 9.2 10.1   ALBUMIN g/dL 3.8 4.2 4.7   MAGNESIUM mg/dL 2.0  --  2.3     Results from last 7 days   Lab Units 02/10/23  0858   LACTATE mmol/L 1.0     No results found for: HGBA1C, POCGLU    XR Chest 2 View    Result Date: 2/9/2023  No evidence for acute pulmonary process. Follow-up as clinical indications persist.  This report was finalized on 2/9/2023 1:59 PM by Dr. Mahendra Sigala M.D.       Gallbladder    Result Date: 2/9/2023  1. Cholelithiasis and gallbladder sludge,  without sonographic evidence of acute cholecystitis 2. Hepatic cysts. No suspicious appearing hepatic lesion seen.  This report was finalized on 2/9/2023 3:18 PM by Dr. Delon Membreno M.D.      MRI abdomen w wo contrast mrcp    Result Date: 2/10/2023  1.  Cholelithiasis with thickened gallbladder wall. Correlate with history and physical exam. If there is clinical concern for acute cholecystitis, nuclear medicine scanning can be performed. 2. No choledocholithiasis seen.  This report was finalized on 2/10/2023 10:14 AM by Dr. Delon Membreno M.D.      I have personally reviewed all medications:  Scheduled Medications  famotidine, 20 mg, Intravenous, Q12H  lisinopril, 10 mg, Oral, Daily  sodium chloride, 10 mL, Intravenous, Q12H    Infusions  sodium chloride, 75 mL/hr, Last Rate: 75 mL/hr (02/11/23 0438)    Diet  Diet: Cardiac Diets; Healthy Heart (2-3 Na+); Texture: Regular Texture (IDDSI 7); Fluid Consistency: Thin (IDDSI 0)    I have personally reviewed:  [x]  Laboratory   []  Microbiology   [x]  Radiology   [x]  EKG/Telemetry  []  Cardiology/Vascular   []  Pathology    [x]  Records       Assessment/Plan     Active Hospital Problems    Diagnosis  POA   • **Chest pain, unspecified type [R07.9]  Yes   • Hyperlipidemia [E78.5]  Yes   • Gilbert syndrome [E80.4]  Yes   • Abnormal LFTs [R79.89]  Yes   • Cholelithiasis [K80.20]  Yes   • Elevated troponin [R77.8]  Yes   • Hypertension [I10]  Yes      Resolved Hospital Problems   No resolved problems to display.         71yo gentleman with h/o HTN, Gilbert's syndrome, and HLD, who presented to ER with c/o several weeks of post-prandial chest discomfort sometimes associated with nausea. In ER his LFTs were elevated in obstructive pattern, trop neg, CBC wnl, CXR NAD, and EKG was neg for ischemia. Repeat trop was slightly elevated.  U/S showed cholelithiasis and sludge but no acute cholecystitis. He was admitted to our service, Card and Surg were consulted, and MRCP was  ordered.    Chest Pain NOS  Elevated Trop: CXR neg, Card consulted, suspect pain due more to GI issues (see below), Trop only mildly elevated and EKG pretty unremarkable. Echo looked fine.    Post-prandial chest discomfort and nausea  Elevated LFTs  Cholelithiasis  Abnl GB imaging: MRCP showed edema of GB wall--no evidence of CBD stone. Presentation c/w passed stone. LFTs continue to fall this AM. Lap mann planned for later today.    HLD: statin on hold given elevated LFTs    HTN: BPs acceptable on Lisinopril      · SCDs for DVT prophylaxis.  · Full code.  · Discussed with patient and family x 2.  · Anticipate discharge TBD        Jaswant Bhatti MD  [facility]  02/11/23  10:25 EST

## 2023-02-11 NOTE — ANESTHESIA PREPROCEDURE EVALUATION
Anesthesia Evaluation                  Airway   Mallampati: II  TM distance: >3 FB  Neck ROM: full  No difficulty expected  Dental      Comment: Missing upper front teeth    Pulmonary - normal exam   (+) a smoker Former,   Cardiovascular - normal exam    (+) hypertension, hyperlipidemia,       Neuro/Psych  GI/Hepatic/Renal/Endo      Musculoskeletal     Abdominal    Substance History      OB/GYN          Other                        Anesthesia Plan    ASA 2     general     intravenous induction     Anesthetic plan, risks, benefits, and alternatives have been provided, discussed and informed consent has been obtained with: patient.        CODE STATUS:    Code Status (Patient has no pulse and is not breathing): CPR (Attempt to Resuscitate)  Medical Interventions (Patient has pulse or is breathing): Full Support

## 2023-02-11 NOTE — PLAN OF CARE
Goal Outcome Evaluation:  Plan of Care Reviewed With: patient        Progress: no change  Outcome Evaluation: VSS; IVF continue; NPO for choley today; Up at edgardo; consent signed; will continue to monitor

## 2023-02-11 NOTE — PROGRESS NOTES
"General Surgery Progress Note    Summary:  Mr. Guillermo Salgado is a 72 y.o. year old gentleman with cholelithiasis.  Plan for cholecystectomy today    Interval Events: No acute events overnight.  N.p.o. for the OR    Vitals: /81 (BP Location: Left arm, Patient Position: Lying)   Pulse 97   Temp 98.2 °F (36.8 °C) (Oral)   Resp 22   Ht 173 cm (68.11\")   Wt 76 kg (167 lb 8.8 oz)   SpO2 99%   BMI 25.39 kg/m²     GENERAL: alert, well appearing, and in no distress  HEENT: normocephalic, atraumatic, moist mucous membranes, clear sclerae   CHEST: no increased work of breathing, symmetric air entry  CARDIAC: regular rate and rhythm    ABDOMEN: Soft, nondistended  EXTREMITIES: no cyanosis, clubbing, or edema   SKIN: Warm and moist, no rashes    Labs:  Results from last 7 days   Lab Units 02/11/23  0536 02/10/23  0858 02/09/23  1259   WBC 10*3/mm3 6.21 5.40 6.09   HEMOGLOBIN g/dL 12.6* 13.2 13.5   HEMATOCRIT % 37.9 39.2 40.9   PLATELETS 10*3/mm3 195 215 260     Results from last 7 days   Lab Units 02/11/23  0536 02/10/23  0858 02/09/23  1259   SODIUM mmol/L 142 140 147*   POTASSIUM mmol/L 4.1 3.9 4.6   CHLORIDE mmol/L 106 103 105   CO2 mmol/L 27.3 24.5 29.0   BUN mg/dL 14 13 14   CREATININE mg/dL 0.83 0.87 0.97   CALCIUM mg/dL 8.8 9.2 10.1   BILIRUBIN mg/dL 2.1* 3.4* 4.9*   ALK PHOS U/L 173* 218* 212*   ALT (SGPT) U/L 291* 450* 575*   AST (SGOT) U/L 129* 273* 574*   GLUCOSE mg/dL 100* 97 126*     Results from last 7 days   Lab Units 02/09/23  1259   INR  1.02   APTT seconds 26.0       CHRISTIAN WALTON MD  General and Endoscopic Surgery  [facility]    [address], [address]  [place], [place], [address]  P: [phone]  F: [phone]     "

## 2023-02-11 NOTE — ANESTHESIA PROCEDURE NOTES
Airway  Urgency: elective    Date/Time: 2/11/2023 12:52 PM    General Information and Staff    Patient location during procedure: OR  Anesthesiologist: Robinson Wilson MD    Indications and Patient Condition    Preoxygenated: yes      Final Airway Details  Final airway type: endotracheal airway      Successful airway: ETT  Cuffed: yes   Successful intubation technique: direct laryngoscopy  Endotracheal tube insertion site: oral  Blade: Jenny  Blade size: 3  ETT size (mm): 7.5  Cormack-Lehane Classification: grade I - full view of glottis  Placement verified by: chest auscultation   Number of attempts at approach: 1

## 2023-02-11 NOTE — ANESTHESIA POSTPROCEDURE EVALUATION
Patient: Guillermo Salgado    Procedure Summary     Date: 02/11/23 Room / Location: [facility] OR  / [facility] MAIN OR    Anesthesia Start: 1245 Anesthesia Stop: 1428    Procedure: CHOLECYSTECTOMY LAPAROSCOPIC INTRAOPERATIVE CHOLANGIOGRAM (Abdomen) Diagnosis:       Calculus of gallbladder with acute cholecystitis without obstruction      (Calculus of gallbladder with acute cholecystitis without obstruction [K80.00])    Surgeons: Vianey Horn MD Provider: Robinson Wilson MD    Anesthesia Type: general ASA Status: 2          Anesthesia Type: general    Vitals  Vitals Value Taken Time   /90 02/11/23 1505   Temp 36.9 °C (98.4 °F) 02/11/23 1500   Pulse 85 02/11/23 1507   Resp 16 02/11/23 1500   SpO2 98 % 02/11/23 1507   Vitals shown include unvalidated device data.        Post Anesthesia Care and Evaluation    Patient location during evaluation: bedside  Patient participation: complete - patient participated  Level of consciousness: awake  Pain management: adequate    Airway patency: patent  Anesthetic complications: No anesthetic complications    Cardiovascular status: acceptable  Respiratory status: acceptable  Hydration status: acceptable

## 2023-02-11 NOTE — OP NOTE
OPERATIVE REPORT     DATE OF OPERATION: 2/11/2023    SURGEON:   Vianey Horn MD    ASSISTANT:  Valentine Oseguera PA-C, who was present for necessary retraction, suctioning, camera holding, and suturing throughout the procedure     PREOPERATIVE DIAGNOSIS: Cholelithiasis    POSTOPERATIVE DIAGNOSIS: Acute cholecystitis    PROCEDURE PERFORMED: Laparoscopic cholecystectomy with intraoperative cholangiogram    ANESTHESIA: General    SPECIMEN: Gallbladder    DRAINS: None    BLOOD LOSS: 20 cc    INDICATIONS FOR OPERATION: Mr. Guillermo Salgado is a 72 y.o. year old gentleman admitted to the hospital for hyperbilirubinemia and abdominal pain.  He underwent work-up that showed no choledocholithiasis.  He presents today for laparoscopic cholecystectomy with cholangiogram. All risks (including bleeding, infection, damage to surrounding structures including the bile duct), benefits, and alternatives were explained to the patient and he agreed and wished to proceed.  Informed consent was obtained.    OPERATIVE REPORT: The patient was taken to the operating room, transferred onto the operating room table, and underwent general endotracheal anesthesia without incident. The patient was prepped and draped in the usual sterile fashion.  Preoperative antibiotics were given, and a timeout was performed.  Half percent Marcaine with epinephrine was injected into the skin and subcutaneous tissues.  Incision was made in the right upper quadrant.  An Optiview trocar with a 5 0 scope was inserted through each layer of the abdominal wall individually and into the abdomen.  The abdomen was insufflated and the area under insertion was inspected and no injuries were noted.  A 5 mm periumbilical and right lateral trocar were placed.  An 11 mm subxiphoid trocar was placed.  There was significant inflammation of the omentum to the gallbladder.  This was carefully taken down with blunt dissection and Bovie electrocautery.  The gallbladder was very dense  with large stones palpated.  The gallbladder was retracted cranially and laterally to allow for adequate visualization.  The area around the infundibulum was dissected until the cystic duct and artery were identified. The critical view was obtained. A cholangiogram was done by placing a clip on the cystic duct and incising it just distal to this.  A cholangiogram catheter was introduced with an Angiocath needle and directed into the cystic duct.  A clip was applied.  With fluoroscopy contrast was injected and confirmed the anatomy and that there were no stones present.  Contrast was seen going into the right and left hepatic ducts as well as the duodenum.  The cholangiogram catheter was then removed.  The cystic duct had 2 clips placed on the bile duct side and was transected.  The same was done to the cystic artery.  Bovie electrocautery was then used to remove the gallbladder from the liver bed.  The gallbladder was very friable and broke in multiple areas.  There were purulent material coming from the gallbladder as well as large gallstones.  The gallbladder was placed in pieces into a bag with large stones also placed in there.  A few additional areas of gallbladder wall were then taken with Bovie electrocautery and added to the bag as well.  The abdomen was copiously irrigated with multiple liters of fluid.  The liver bed was cauterized.  The area was then irrigated and inspected for hemostasis.  There was no bleeding or bile noted.  The gallbladder was then removed through the subxiphoid port.  A 19 Swedish Larry drain was passed through the right lateral incision and placed along the gallbladder fossa the ports were removed under direct visualization. The fascial of the subxiphoid port was closed with 0 Vicryl suture. The incisions were then closed with 4-0 Vicryl sutures and Dermabond.  All needle and lap counts were correct at the end of the case.  Was awoken from general endotracheal anesthesia and taken to  the recovery area for further monitoring.    CHRISTIAN WALTON M.D.  General and Endoscopic Surgery  [facility]    [address], [address]  [place], [place], [address]  P: [phone]  F: [phone]

## 2023-02-12 VITALS
TEMPERATURE: 97.7 F | RESPIRATION RATE: 20 BRPM | HEART RATE: 67 BPM | OXYGEN SATURATION: 95 % | BODY MASS INDEX: 25.39 KG/M2 | SYSTOLIC BLOOD PRESSURE: 100 MMHG | DIASTOLIC BLOOD PRESSURE: 68 MMHG | HEIGHT: 68 IN | WEIGHT: 167.55 LBS

## 2023-02-12 PROBLEM — K81.0 ACUTE CHOLECYSTITIS: Status: ACTIVE | Noted: 2023-02-12

## 2023-02-12 LAB
ALBUMIN SERPL-MCNC: 3.4 G/DL (ref 3.5–5.2)
ALBUMIN/GLOB SERPL: 1.4 G/DL
ALP SERPL-CCNC: 133 U/L (ref 39–117)
ALT SERPL W P-5'-P-CCNC: 181 U/L (ref 1–41)
ANION GAP SERPL CALCULATED.3IONS-SCNC: 7 MMOL/L (ref 5–15)
AST SERPL-CCNC: 90 U/L (ref 1–40)
BILIRUB SERPL-MCNC: 1.9 MG/DL (ref 0–1.2)
BUN SERPL-MCNC: 15 MG/DL (ref 8–23)
BUN/CREAT SERPL: 18.1 (ref 7–25)
CALCIUM SPEC-SCNC: 8.6 MG/DL (ref 8.6–10.5)
CHLORIDE SERPL-SCNC: 104 MMOL/L (ref 98–107)
CO2 SERPL-SCNC: 27 MMOL/L (ref 22–29)
CREAT SERPL-MCNC: 0.83 MG/DL (ref 0.76–1.27)
DEPRECATED RDW RBC AUTO: 41.6 FL (ref 37–54)
EGFRCR SERPLBLD CKD-EPI 2021: 91.8 ML/MIN/1.73
ERYTHROCYTE [DISTWIDTH] IN BLOOD BY AUTOMATED COUNT: 12.5 % (ref 12.3–15.4)
GLOBULIN UR ELPH-MCNC: 2.5 GM/DL
GLUCOSE SERPL-MCNC: 106 MG/DL (ref 65–99)
HCT VFR BLD AUTO: 33.6 % (ref 37.5–51)
HGB BLD-MCNC: 11.3 G/DL (ref 13–17.7)
MAGNESIUM SERPL-MCNC: 1.9 MG/DL (ref 1.6–2.4)
MCH RBC QN AUTO: 31 PG (ref 26.6–33)
MCHC RBC AUTO-ENTMCNC: 33.6 G/DL (ref 31.5–35.7)
MCV RBC AUTO: 92.1 FL (ref 79–97)
PLATELET # BLD AUTO: 150 10*3/MM3 (ref 140–450)
PMV BLD AUTO: 10.8 FL (ref 6–12)
POTASSIUM SERPL-SCNC: 4.2 MMOL/L (ref 3.5–5.2)
PROT SERPL-MCNC: 5.9 G/DL (ref 6–8.5)
RBC # BLD AUTO: 3.65 10*6/MM3 (ref 4.14–5.8)
SODIUM SERPL-SCNC: 138 MMOL/L (ref 136–145)
WBC NRBC COR # BLD: 8.6 10*3/MM3 (ref 3.4–10.8)

## 2023-02-12 PROCEDURE — A9270 NON-COVERED ITEM OR SERVICE: HCPCS | Performed by: STUDENT IN AN ORGANIZED HEALTH CARE EDUCATION/TRAINING PROGRAM

## 2023-02-12 PROCEDURE — 63710000001 HYDROCODONE-ACETAMINOPHEN 5-325 MG TABLET: Performed by: HOSPITALIST

## 2023-02-12 PROCEDURE — 63710000001 POLYETHYLENE GLYCOL 17 G PACK: Performed by: STUDENT IN AN ORGANIZED HEALTH CARE EDUCATION/TRAINING PROGRAM

## 2023-02-12 PROCEDURE — A9270 NON-COVERED ITEM OR SERVICE: HCPCS | Performed by: HOSPITALIST

## 2023-02-12 PROCEDURE — G0378 HOSPITAL OBSERVATION PER HR: HCPCS

## 2023-02-12 PROCEDURE — 63710000001 LISINOPRIL 10 MG TABLET: Performed by: STUDENT IN AN ORGANIZED HEALTH CARE EDUCATION/TRAINING PROGRAM

## 2023-02-12 PROCEDURE — 83735 ASSAY OF MAGNESIUM: CPT | Performed by: STUDENT IN AN ORGANIZED HEALTH CARE EDUCATION/TRAINING PROGRAM

## 2023-02-12 PROCEDURE — 85027 COMPLETE CBC AUTOMATED: CPT | Performed by: STUDENT IN AN ORGANIZED HEALTH CARE EDUCATION/TRAINING PROGRAM

## 2023-02-12 PROCEDURE — 80053 COMPREHEN METABOLIC PANEL: CPT | Performed by: STUDENT IN AN ORGANIZED HEALTH CARE EDUCATION/TRAINING PROGRAM

## 2023-02-12 RX ORDER — AMOXICILLIN AND CLAVULANATE POTASSIUM 875; 125 MG/1; MG/1
1 TABLET, FILM COATED ORAL 2 TIMES DAILY
Qty: 10 TABLET | Refills: 0 | Status: SHIPPED | OUTPATIENT
Start: 2023-02-12 | End: 2023-03-08

## 2023-02-12 RX ORDER — HYDROCODONE BITARTRATE AND ACETAMINOPHEN 5; 325 MG/1; MG/1
1 TABLET ORAL EVERY 6 HOURS PRN
Qty: 12 TABLET | Refills: 0 | Status: SHIPPED | OUTPATIENT
Start: 2023-02-12 | End: 2023-03-08

## 2023-02-12 RX ORDER — POLYETHYLENE GLYCOL 3350 17 G/17G
17 POWDER, FOR SOLUTION ORAL DAILY
Qty: 850 G | Refills: 0 | Status: SHIPPED | OUTPATIENT
Start: 2023-02-12 | End: 2023-03-08

## 2023-02-12 RX ADMIN — HYDROCODONE BITARTRATE AND ACETAMINOPHEN 1 TABLET: 5; 325 TABLET ORAL at 07:03

## 2023-02-12 RX ADMIN — LISINOPRIL 10 MG: 10 TABLET ORAL at 08:58

## 2023-02-12 RX ADMIN — SODIUM CHLORIDE 75 ML/HR: 9 INJECTION, SOLUTION INTRAVENOUS at 07:03

## 2023-02-12 RX ADMIN — Medication 10 ML: at 08:58

## 2023-02-12 RX ADMIN — HYDROCODONE BITARTRATE AND ACETAMINOPHEN 1 TABLET: 5; 325 TABLET ORAL at 12:06

## 2023-02-12 RX ADMIN — FAMOTIDINE 20 MG: 10 INJECTION INTRAVENOUS at 08:58

## 2023-02-12 RX ADMIN — POLYETHYLENE GLYCOL 3350 17 G: 17 POWDER, FOR SOLUTION ORAL at 08:58

## 2023-02-12 NOTE — PROGRESS NOTES
Name: Guillermo Salgado ADMIT: 2023   : 1950  PCP: Radha Jacobson MD    MRN: [id number] LOS: 0 days   AGE/SEX: 72 y.o. male  ROOM: [facility]/     Subjective   Subjective   Feeling okay today. No chest pain. No N/V. Voiding well. Denies any SOA or palp. Tolerated breakfast. +Flatus. No BM since Friday. Abdomen just a little sore at site of drain in RUQ.    Review of Systems     as above    Objective   Objective   Vital Signs  Temp:  [97.6 °F (36.4 °C)-98.4 °F (36.9 °C)] 97.7 °F (36.5 °C)  Heart Rate:  [67-97] 67  Resp:  [16-22] 20  BP: (100-153)/(68-90) 100/68  SpO2:  [93 %-100 %] 95 %  on  Flow (L/min):  [3-4] 3;   Device (Oxygen Therapy): room air  Body mass index is 25.39 kg/m².     Physical Exam  Vitals and nursing note reviewed. Exam conducted with a chaperone present (RN).   Constitutional:       General: He is not in acute distress.     Appearance: He is not ill-appearing, toxic-appearing or diaphoretic.   HENT:      Head: Normocephalic.      Right Ear: External ear normal.      Left Ear: External ear normal.      Nose: Nose normal.      Mouth/Throat:      Mouth: Mucous membranes are moist.      Pharynx: Oropharynx is clear.   Eyes:      General: No scleral icterus.        Right eye: No discharge.         Left eye: No discharge.      Extraocular Movements: Extraocular movements intact.      Conjunctiva/sclera: Conjunctivae normal.   Cardiovascular:      Rate and Rhythm: Normal rate and regular rhythm.      Pulses: Normal pulses.   Pulmonary:      Effort: Pulmonary effort is normal. No respiratory distress.      Breath sounds: Normal breath sounds. No wheezing or rales.   Chest:      Chest wall: No tenderness.   Abdominal:      General: Bowel sounds are normal. There is no distension.      Palpations: Abdomen is soft.      Tenderness: There is abdominal tenderness (appropriately postop). There is no guarding or rebound.      Comments: Drain RUQ with serosanguinous fluid in bulb   Musculoskeletal:          General: No swelling or deformity. Normal range of motion.      Cervical back: Neck supple. No rigidity.   Lymphadenopathy:      Cervical: No cervical adenopathy.   Skin:     General: Skin is warm and dry.      Capillary Refill: Capillary refill takes less than 2 seconds.      Coloration: Skin is not jaundiced.      Findings: No rash.   Neurological:      General: No focal deficit present.      Mental Status: He is alert and oriented to person, place, and time. Mental status is at baseline.      Cranial Nerves: No cranial nerve deficit.      Coordination: Coordination normal.   Psychiatric:         Mood and Affect: Mood normal.         Behavior: Behavior normal.         Thought Content: Thought content normal.      Comments: Very pleasant       Results Review     I reviewed the patient's new clinical results.  Results from last 7 days   Lab Units 02/12/23  0500 02/11/23  0536 02/10/23  0858 02/09/23  1259   WBC 10*3/mm3 8.60 6.21 5.40 6.09   HEMOGLOBIN g/dL 11.3* 12.6* 13.2 13.5   PLATELETS 10*3/mm3 150 195 215 260     Results from last 7 days   Lab Units 02/12/23  0500 02/11/23  0536 02/10/23  0858 02/09/23  1259   SODIUM mmol/L 138 142 140 147*   POTASSIUM mmol/L 4.2 4.1 3.9 4.6   CHLORIDE mmol/L 104 106 103 105   CO2 mmol/L 27.0 27.3 24.5 29.0   BUN mg/dL 15 14 13 14   CREATININE mg/dL 0.83 0.83 0.87 0.97   GLUCOSE mg/dL 106* 100* 97 126*   EGFR mL/min/1.73 91.8 91.8 90.5 81.9     Results from last 7 days   Lab Units 02/12/23  0500 02/11/23  0536 02/10/23  0858 02/09/23  1259   ALBUMIN g/dL 3.4* 3.8 4.2 4.7   BILIRUBIN mg/dL 1.9* 2.1* 3.4* 4.9*   ALK PHOS U/L 133* 173* 218* 212*   AST (SGOT) U/L 90* 129* 273* 574*   ALT (SGPT) U/L 181* 291* 450* 575*     Results from last 7 days   Lab Units 02/12/23  0500 02/11/23 0536 02/10/23  0858 02/09/23  1259   CALCIUM mg/dL 8.6 8.8 9.2 10.1   ALBUMIN g/dL 3.4* 3.8 4.2 4.7   MAGNESIUM mg/dL 1.9 2.0  --  2.3     Results from last 7 days   Lab Units 02/10/23  0858   LACTATE  mmol/L 1.0     No results found for: HGBA1C, POCGLU    MRI abdomen w wo contrast mrcp    Result Date: 2/10/2023  1.  Cholelithiasis with thickened gallbladder wall. Correlate with history and physical exam. If there is clinical concern for acute cholecystitis, nuclear medicine scanning can be performed. 2. No choledocholithiasis seen.  This report was finalized on 2/10/2023 10:14 AM by Dr. Delon Membreno M.D.      [place] Cholangiogram Operative    Result Date: 2/11/2023   As described.  This report was finalized on 2/11/2023 2:29 PM by Dr. Mahendra Sigala M.D.      I have personally reviewed all medications:  Scheduled Medications  famotidine, 20 mg, Intravenous, Q12H  lisinopril, 10 mg, Oral, Daily  polyethylene glycol, 17 g, Oral, Daily  sodium chloride, 10 mL, Intravenous, Q12H    Infusions  lactated ringers, 9 mL/hr, Last Rate: 9 mL/hr (02/12/23 0002)    Diet  Diet: Regular/House Diet; Texture: Regular Texture (IDDSI 7); Fluid Consistency: Thin (IDDSI 0)    I have personally reviewed:  [x]  Laboratory   []  Microbiology   [x]  Radiology   [x]  EKG/Telemetry  []  Cardiology/Vascular   []  Pathology    []  Records       Assessment/Plan     Active Hospital Problems    Diagnosis  POA   • Acute cholecystitis [K81.0]  Yes   • Hyperlipidemia [E78.5]  Yes   • Gilbert syndrome [E80.4]  Yes   • Abnormal LFTs [R79.89]  Yes   • Cholelithiasis [K80.20]  Yes   • Elevated troponin [R77.8]  Yes   • Hypertension [I10]  Yes      Resolved Hospital Problems    Diagnosis Date Resolved POA   • **Chest pain, unspecified type [R07.9] 02/11/2023 Yes         73yo gentleman with h/o HTN, Gilbert's syndrome, and HLD, who presented to ER with c/o several weeks of post-prandial chest discomfort sometimes associated with nausea. In ER his LFTs were elevated in obstructive pattern, trop neg, CBC wnl, CXR NAD, and EKG was neg for ischemia. Repeat trop was slightly elevated.  U/S showed cholelithiasis and sludge but no acute cholecystitis. He  was admitted to our service, Card and Surg were consulted, and MRCP was ordered.    Chest Pain NOS  Elevated Trop: CXR neg, Card consulted, suspect pain due more to GI issues (see below), Trop only mildly elevated and EKG pretty unremarkable. Echo looked fine.    Post-prandial chest discomfort and nausea  Elevated LFTs  Cholelithiasis  Acute cholecystitis: MRCP showed edema of GB wall--no evidence of CBD stone. Presentation c/w passed stone. S/p lap mann yesterday with Dr. Horn--GB was found to be grossly infected at time of removal. LFTs continue to fall this AM. No BM for several days now. On Miralax per Surg. No BM since Friday. Stop IVFs as taking po fine.    HLD: statin on hold given elevated LFTs    HTN: BPs acceptable on current dose Lisinopril, had some elevations yesterday due to pain postop--much improved today      · SCDs for DVT prophylaxis.  · Full code.  · Discussed with patient and RN.  · Anticipate discharge home with family when okay with Surg.    Jaswant Bhatti MD  [facility]  02/12/23  09:05 EST

## 2023-02-12 NOTE — PLAN OF CARE
Goal Outcome Evaluation:  Plan of Care Reviewed With: patient        Progress: improving  Outcome Evaluation: Maintained on R/A, lap sites x4 dry and intact. J/P with 60 cc total serousang drainage. PRN Morphine x1 and Norco x1 for c/o's of RUQ discomfort with good effect. Voiding with difficulty. Ambulatory in room. Voiding without difficulty, urine is dark and concentrated.  Safety maintained.

## 2023-02-12 NOTE — DISCHARGE SUMMARY
Patient Name: Guillermo Salgado  : 1950  MRN: [id number]    Date of Admission: 2023  Date of Discharge:  2023  Primary Care Physician: Radha Jacobson MD      Chief Complaint:   Chest Pain      Discharge Diagnoses     Active Hospital Problems    Diagnosis  POA   • **Acute cholecystitis [K81.0]  Yes   • Hyperlipidemia [E78.5]  Yes   • Gilbert syndrome [E80.4]  Yes   • Abnormal LFTs [R79.89]  Yes   • Cholelithiasis [K80.20]  Yes   • Elevated troponin [R77.8]  Yes   • Hypertension [I10]  Yes      Resolved Hospital Problems    Diagnosis Date Resolved POA   • Chest pain, unspecified type [R07.9] 2023 Yes        Hospital Course     Very pleasant 71yo gentleman with h/o HTN, Gilbert's syndrome, and HLD, who presented to ER with c/o several weeks of post-prandial chest discomfort sometimes associated with nausea. In ER his LFTs were elevated in obstructive pattern, trop neg, CBC wnl, CXR NAD, and EKG was neg for ischemia. Repeat trop was slightly elevated.  U/S showed cholelithiasis and sludge but no acute cholecystitis. He was admitted to our service, Card and Surg were consulted, and MRCP was ordered. Please see below for details of admission:     Chest Pain NOS  Elevated Trop: CXR neg, Card consulted, suspect pain due more to GI issues (see below), Trop only mildly elevated and EKG pretty unremarkable. Echo looked fine.     Post-prandial chest discomfort and nausea  Elevated LFTs  Cholelithiasis  Acute cholecystitis: MRCP showed edema of GB wall--no evidence of CBD stone. Presentation c/w passed stone. S/p lap mann yesterday with Dr. Horn--GB was found to be grossly infected at time of removal. LFTs continue to fall this AM. No BM for several days now. On Miralax per Surg. No BM since Friday. Stopped IVFs as taking po fine. Okay for dc home today per Surg. Home on 5d of oral Augmentin. F/u in 2 weeks. Drain out prior to dc.     HLD: statin on hold given elevated LFTs, defer restarting this  medication to pt's PCP at follow-up.     HTN: BPs acceptable on current dose Lisinopril, had some elevations yesterday due to pain postop--much improved today        • SCDs sufficed for DVT prophylaxis.  • Full code confirmed.  • Discussed with patient and RN.  • Discharge home with family today--okay with Surg    Day of Discharge     Subjective:  Feeling okay today. No chest pain. No N/V. Voiding well. Denies any SOA or palp. Tolerated breakfast. +Flatus. No BM since Friday. Abdomen just a little sore at site of drain in RUQ. Eager to go home.    Physical Exam:  Temp:  [97.6 °F (36.4 °C)-98.4 °F (36.9 °C)] 97.7 °F (36.5 °C)  Heart Rate:  [67-97] 67  Resp:  [16-22] 20  BP: (100-153)/(68-90) 100/68  Body mass index is 25.39 kg/m².  Physical Exam  Vitals and nursing note reviewed. Exam conducted with a chaperone present (RN). Up ad edgardo in room.  Constitutional:       General: He is not in acute distress.     Appearance: He is not ill-appearing, toxic-appearing or diaphoretic.   Cardiovascular:      Rate and Rhythm: Normal rate and regular rhythm.      Pulses: Normal pulses.   Pulmonary:      Effort: Pulmonary effort is normal. No respiratory distress.      Breath sounds: Normal breath sounds. No wheezing or rales.   Chest:      Chest wall: No tenderness.   Abdominal:      General: Bowel sounds are normal. There is no distension.      Palpations: Abdomen is soft.      Tenderness: There is abdominal tenderness (appropriately postop). There is no guarding or rebound.      Comments: Drain RUQ with serosanguinous fluid in bulb   Musculoskeletal:         General: No swelling or deformity. Normal range of motion.   Skin:     General: Skin is warm and dry.      Capillary Refill: Capillary refill takes less than 2 seconds.   Neurological:      General: No focal deficit present.      Mental Status: He is alert and oriented to person, place, and time. Mental status is at baseline.   Psychiatric:         Mood and Affect: Mood  normal.         Behavior: Behavior normal.         Thought Content: Thought content normal.      Comments: Very pleasant         Consultants     Consult Orders (all) (From admission, onward)     Start     Ordered    02/09/23 1644  Inpatient General Surgery Consult  Once        Specialty:  General Surgery  Provider:  Alan Mccray Jr., MD    02/09/23 1643    02/09/23 1638  Inpatient Consult to Advance Care Planning  Once        Provider:  (Not yet assigned)    02/09/23 1637    02/09/23 1413  [facility] (on-call MD unless specified) Details  Once        Specialty:  Hospitalist  Provider:  Anoop Trujillo MD    02/09/23 1412    02/09/23 1413  Cardiology (on-call MD unless specified)  Once        Specialty:  Cardiology  Provider:  (Not yet assigned)    02/09/23 1412              Procedures     CHOLECYSTECTOMY LAPAROSCOPIC INTRAOPERATIVE CHOLANGIOGRAM      Imaging Results (All)     Procedure Component Value Units Date/Time    FL Cholangiogram Operative [[id number]] Collected: 02/11/23 1428     Updated: 02/11/23 1432    Narrative:      FL CHOLANGIOGRAM OPERATIVE-     INDICATIONS: Operative cholangiography     TECHNIQUE: FLUOROSCOPIC ASSISTANCE IN THE OPERATING ROOM.     FINDINGS:     68 intraoperative fluoroscopic spot views were obtained and recorded the  PACS for review, revealing opacification of cystic duct remnant,  extrahepatic and central intrahepatic biliary ducts, and a portion of  the duodenum. No biliary obstruction or definite persistent filling  defect. Please see operative report for full details.     Fluoroscopy time: 11.4 seconds       Impression:         As described.     This report was finalized on 2/11/2023 2:29 PM by Dr. Mahendra Sigala M.D.       MRI abdomen w wo contrast mrcp [[id number]] Collected: 02/10/23 1008     Updated: 02/10/23 1017    Narrative:      Examination: MRI of the abdomen without and with contrast per protocol     MRCP     TECHNIQUE: MRI of the abdomen was obtained before and after  the  uneventful administration of gadolinium based contrast per protocol.  MRCP images were obtained     HISTORY: Cholelithiasis and choledocholithiasis     COMPARISON: Ultrasound of 02/09/2023      FINDINGS: The liver is not steatotic or convincingly cirrhotic. Multiple  cysts are seen liver. No suspicious appearing hepatic lesions are seen.     There is cholelithiasis with thickened gallbladder wall. The common duct  is normal in caliber, measuring approximately 4 mm, without filling  defect seen and without intrahepatic ductal dilation seen. The  pancreatic duct is normal in caliber.     The spleen, adrenal glands, kidneys, pancreas, stomach, and visualized  large and small bowel and abdominal vasculature are normal. No enlarged  lymph nodes are seen. No suspicious osseous lesions are identified       Impression:      1.  Cholelithiasis with thickened gallbladder wall. Correlate  with history and physical exam. If there is clinical concern for acute  cholecystitis, nuclear medicine scanning can be performed.  2. No choledocholithiasis seen.     This report was finalized on 2/10/2023 10:14 AM by Dr. Delon Membreno M.D.       US Gallbladder [[id number]] Collected: 02/09/23 1516     Updated: 02/09/23 1527    Narrative:      Examination: Abdominal sonogram     TECHNIQUE: Sonographic images of the abdomen were obtained     HISTORY:Chest pain and elevated bilirubin     COMPARISON: None available     FINDINGS: The pancreatic head and body are normal. Cysts are seen in the  liver, which is otherwise normal in appearance, without surface  nodularity seen and without solid mass seen in visualized portions.  Sludge and stones are seen the gallbladder, without mural edema,  pericholecystic fluid, or by report sonographic Dawkins's sign. The  common duct measures 6 mm in its midportion. The portal vein is patent,  with antegrade flow. The right kidney is normal in appearance, without  hydronephrosis, measuring approximately 11  cm.       Impression:      1. Cholelithiasis and gallbladder sludge, without sonographic evidence  of acute cholecystitis  2. Hepatic cysts. No suspicious appearing hepatic lesion seen.     This report was finalized on 2/9/2023 3:18 PM by Dr. Delon Membreno M.D.       XR Chest 2 View [[id number]] Collected: 02/09/23 1359     Updated: 02/09/23 1403    Narrative:      XR CHEST 2 VW-     HISTORY: Male who is 72 years-old,  chest pain     TECHNIQUE: Frontal and lateral views of the chest     COMPARISON: None available     FINDINGS: Heart, mediastinum and pulmonary vasculature are unremarkable.  No focal pulmonary consolidation, pleural effusion, or pneumothorax. Old  granulomatous disease is seen. No acute osseous process.       Impression:      No evidence for acute pulmonary process. Follow-up as  clinical indications persist.     This report was finalized on 2/9/2023 1:59 PM by Dr. Mahendra Sigala M.D.             Results for orders placed during the hospital encounter of 02/09/23    Adult Transthoracic Echo Complete W/ Cont if Necessary Per Protocol    Interpretation Summary  •  Left ventricular systolic function is normal. Left ventricular ejection fraction appears to be 66 - 70%.  •  Left ventricular diastolic function was normal.  •  The right ventricular cavity is mildly dilated. Normal right ventricular systolic function noted.  •  Mild mitral valve regurgitation is present.  •  Mild tricuspid valve regurgitation is present.  •  Calculated right ventricular systolic pressure from tricuspid regurgitation is 29 mmHg.  •  There is no evidence of pericardial effusion.    Pertinent Labs     Results from last 7 days   Lab Units 02/12/23  0500 02/11/23  0536 02/10/23  0858 02/09/23  1259   WBC 10*3/mm3 8.60 6.21 5.40 6.09   HEMOGLOBIN g/dL 11.3* 12.6* 13.2 13.5   PLATELETS 10*3/mm3 150 195 215 260     Results from last 7 days   Lab Units 02/12/23  0500 02/11/23  0536 02/10/23  0858 02/09/23  1259   SODIUM mmol/L 138  142 140 147*   POTASSIUM mmol/L 4.2 4.1 3.9 4.6   CHLORIDE mmol/L 104 106 103 105   CO2 mmol/L 27.0 27.3 24.5 29.0   BUN mg/dL 15 14 13 14   CREATININE mg/dL 0.83 0.83 0.87 0.97   GLUCOSE mg/dL 106* 100* 97 126*   EGFR mL/min/1.73 91.8 91.8 90.5 81.9     Results from last 7 days   Lab Units 02/12/23  0500 02/11/23  0536 02/10/23  0858 02/09/23  1259   ALBUMIN g/dL 3.4* 3.8 4.2 4.7   BILIRUBIN mg/dL 1.9* 2.1* 3.4* 4.9*   ALK PHOS U/L 133* 173* 218* 212*   AST (SGOT) U/L 90* 129* 273* 574*   ALT (SGPT) U/L 181* 291* 450* 575*     Results from last 7 days   Lab Units 02/12/23  0500 02/11/23  0536 02/10/23  0858 02/09/23  1259   CALCIUM mg/dL 8.6 8.8 9.2 10.1   ALBUMIN g/dL 3.4* 3.8 4.2 4.7   MAGNESIUM mg/dL 1.9 2.0  --  2.3       Results from last 7 days   Lab Units 02/10/23  0858 02/09/23  1533 02/09/23  1259   HSTROP T ng/L 13 15* 8   PROBNP pg/mL  --   --  395.0           Invalid input(s): LDLCALC          Test Results Pending at Discharge     Pending Labs     Order Current Status    Tissue Pathology Exam Collected (02/11/23 1401)          Discharge Details        Discharge Medications      New Medications      Instructions Start Date   amoxicillin-clavulanate 875-125 MG per tablet  Commonly known as: Augmentin   1 tablet, Oral, 2 Times Daily      HYDROcodone-acetaminophen 5-325 MG per tablet  Commonly known as: Norco   1 tablet, Oral, Every 6 Hours PRN      polyethylene glycol 17 GM/SCOOP powder  Commonly known as: MiraLax   17 g, Oral, Daily         Continue These Medications      Instructions Start Date   lisinopril 10 MG tablet  Commonly known as: PRINIVIL,ZESTRIL   Oral, Daily         Stop These Medications    atorvastatin 10 MG tablet  Commonly known as: LIPITOR            No Known Allergies    Discharge Disposition:  Home or Self Care      Discharge Diet:  Diet Order   Procedures   • Diet: Regular/House Diet; Texture: Regular Texture (IDDSI 7); Fluid Consistency: Thin (IDDSI 0)       Discharge Activity:   Per  Dr. Horn's postop instructions    CODE STATUS:    Code Status and Medical Interventions:   Ordered at: 02/09/23 1643     Code Status (Patient has no pulse and is not breathing):    CPR (Attempt to Resuscitate)     Medical Interventions (Patient has pulse or is breathing):    Full Support       No future appointments.  Additional Instructions for the Follow-ups that You Need to Schedule     Discharge Follow-up with PCP   As directed       Currently Documented PCP:    Radha Jacobson MD    PCP Phone Number:    [phone]     Follow Up Details: Dr. Jacobson (PCP) in 1 week         Discharge Follow-up with Specified Provider: Dr. Horn (Surg); 2 Weeks   As directed      To: Dr. Horn (Surg)    Follow Up: 2 Weeks            Follow-up Information     Radha Jacobson MD .    Specialty: Family Medicine  Why: Dr. Jacobson (PCP) in 1 week  Contact information:  [address] [place] [address] [place] [address] [phone]                         Additional Instructions for the Follow-ups that You Need to Schedule     Discharge Follow-up with PCP   As directed       Currently Documented PCP:    Radha Jacobson MD    PCP Phone Number:    [phone]     Follow Up Details: Dr. Jacobson (PCP) in 1 week         Discharge Follow-up with Specified Provider: Dr. Horn (Surg); 2 Weeks   As directed      To: Dr. Horn (Surg)    Follow Up: 2 Weeks           Time Spent on Discharge:  Greater than 30 minutes      Jaswant Bhatti MD  [facility]  02/12/23  10:20 EST

## 2023-02-12 NOTE — PLAN OF CARE
Patient admitted on 2/9 for chest pain and negative cardiac work-up. Found to need a Lap cholecysyectomy. Surgery preformed today. CHAN drain and 4 lap sites. Incisional pain, See MAR. Bowel regimen  added per pt request. Up to the bathroom and void spontaneously.

## 2023-02-12 NOTE — PROGRESS NOTES
General surgery    Postoperative day 1 status post laparoscopic cholecystectomy with cholangiogram    Overall doing very well.  His pain is controlled with pain medication.  He ate breakfast this morning.  He is up and walking around the room.    Abdomen soft and nondistended, CHAN drain serosanguineous    Plan:  Okay for home today.    Will send home on 5 days of antibiotics due to degree of cholecystitis.  Remove drain before discharge.  Narcotic prescription sent in.  Follow-up in 2 weeks.    Vianey Horn MD

## 2023-02-13 NOTE — CASE MANAGEMENT/SOCIAL WORK
Case Management Discharge Note      Final Note: home         Selected Continued Care - Discharged on 2/12/2023 Admission date: 2/9/2023 - Discharge disposition: Home or Self Care    Destination    No services have been selected for the patient.              Durable Medical Equipment    No services have been selected for the patient.              Dialysis/Infusion    No services have been selected for the patient.              Home Medical Care    No services have been selected for the patient.              Therapy    No services have been selected for the patient.              Community Resources    No services have been selected for the patient.              Community & DME    No services have been selected for the patient.                  Transportation Services  Private: Car    Final Discharge Disposition Code: 01 - home or self-care

## 2023-02-16 ENCOUNTER — NURSE TRIAGE (OUTPATIENT)
Dept: CALL CENTER | Facility: HOSPITAL | Age: 73
End: 2023-02-16
Payer: MEDICARE

## 2023-02-16 ENCOUNTER — TELEPHONE (OUTPATIENT)
Dept: SURGERY | Facility: CLINIC | Age: 73
End: 2023-02-16
Payer: MEDICARE

## 2023-02-16 NOTE — TELEPHONE ENCOUNTER
Reason for Disposition  • [1] Caller has NON-URGENT medicine question about med that PCP prescribed AND [2] triager unable to answer question    Additional Information  • Negative: [1] Intentional drug overdose AND [2] suicidal thoughts or ideas  • Negative: Drug overdose and triager unable to answer question  • Negative: Caller requesting information unrelated to medicine  • Negative: Caller requesting information about COVID-19 Vaccine  • Negative: Caller requesting information about Emergency Contraception  • Negative: Caller requesting information about Combined Birth Control Pills  • Negative: Caller requesting information about Progestin Birth Control Pills  • Negative: Caller requesting information about Post-Op pain or medicines  • Negative: Caller requesting a prescription antibiotic (such as Penicillin) for Strep throat and has a positive culture result  • Negative: Caller requesting a prescription anti-viral med (such as Tamiflu) and has influenza (flu)  symptoms  • Negative: Immunization reaction suspected  • Negative: Rash while taking a medicine or within 3 days of stopping it  • Negative: [1] Asthma and [2] having symptoms of asthma (cough, wheezing, etc.)  • Negative: [1] Symptom of illness (e.g., headache, abdominal pain, earache, vomiting) AND [2] more than mild  • Negative: Breastfeeding questions about mother's medicines and diet  • Negative: MORE THAN A DOUBLE DOSE of a prescription or over-the-counter (OTC) drug  • Negative: [1] DOUBLE DOSE (an extra dose or lesser amount) of prescription drug AND [2] any symptoms (e.g., dizziness, nausea, pain, sleepiness)  • Negative: [1] DOUBLE DOSE (an extra dose or lesser amount) of over-the-counter (OTC) drug AND [2] any symptoms (e.g., dizziness, nausea, pain, sleepiness)  • Negative: Took another person's prescription drug  • Negative: [1] DOUBLE DOSE (an extra dose or lesser amount) of prescription drug AND [2] NO symptoms (Exception: a double dose  "of antibiotics)  • Negative: Diabetes drug error or overdose (e.g., took wrong type of insulin or took extra dose)  • Negative: [1] Prescription refill request for ESSENTIAL medicine (i.e., likelihood of harm to patient if not taken) AND [2] triager unable to refill per department policy  • Negative: [1] Prescription not at pharmacy AND [2] was prescribed by PCP recently (Exception: triager has access to EMR and prescription is recorded there. Go to Home Care and confirm for pharmacy.)  • Negative: [1] Pharmacy calling with prescription question AND [2] triager unable to answer question  • Negative: [1] Caller has URGENT medicine question about med that PCP or specialist prescribed AND [2] triager unable to answer question  • Negative: Medicine patch causing local rash or itching  • Negative: [1] Caller has medicine question about med NOT prescribed by PCP AND [2] triager unable to answer question (e.g., compatibility with other med, storage)  • Negative: Prescription request for new medicine (not a refill)  • Negative: Prescription refill request for a CONTROLLED substance (e.g., narcotics, ADHD medicines)  • Negative: [1] Prescription refill request for NON-ESSENTIAL medicine (i.e., no harm to patient if med not taken) AND [2] triager unable to refill per department policy    Answer Assessment - Initial Assessment Questions  1. NAME of MEDICATION: \"What medicine are you calling about?\"      Resuming atorvastatin was d/c  2/12  2. QUESTION: \"What is your question?\" (e.g., medication refill, side effect)      When to resyme  3. PRESCRIBING HCP: \"Who prescribed it?\" Reason: if prescribed by specialist, call should be referred to that group.      Dr. Vianey Horn did the surgery  4. SYMPTOMS: \"Do you have any symptoms?\"      Gallbladder disease  5. SEVERITY: If symptoms are present, ask \"Are they mild, moderate or severe?\"      na  6. PREGNANCY:  \"Is there any chance that you are pregnant?\" \"When was your last " "menstrual period?\"      na    Protocols used: MEDICATION QUESTION CALL-ADULT-      "

## 2023-02-17 LAB
LAB AP CASE REPORT: NORMAL
LAB AP DIAGNOSIS COMMENT: NORMAL
LAB AP SYNOPTIC CHECKLIST: NORMAL
PATH REPORT.FINAL DX SPEC: NORMAL
PATH REPORT.GROSS SPEC: NORMAL

## 2023-02-21 ENCOUNTER — TELEPHONE (OUTPATIENT)
Dept: SURGERY | Facility: CLINIC | Age: 73
End: 2023-02-21
Payer: MEDICARE

## 2023-02-21 DIAGNOSIS — C23 GALLBLADDER CANCER: Primary | ICD-10-CM

## 2023-02-21 NOTE — TELEPHONE ENCOUNTER
Called regarding pathology results. Referral to Dr. Benedict with oncology and Dr. Alex Delgado with hepatobiliary surgery regarding need for adjuvant therapies.     Final Diagnosis   1. 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  B. Tumor size:  5.0 cm in greatest dimension.               C. Tumor invades perimuscular connective tissue.               D. Negative for lymphovascular space invasion.               E. Positive for focal perineural invasion.               F. Cystic duct margin is negative for carcinoma (see comment).                 G. Cholelithiasis.               H. Follicular cholecystitis.               I. See synoptic report and comment.     Vianey Horn MD

## 2023-02-27 DIAGNOSIS — C23 GALLBLADDER CANCER: Primary | ICD-10-CM

## 2023-02-27 PROCEDURE — 99358 PROLONG SERVICE W/O CONTACT: CPT | Performed by: INTERNAL MEDICINE

## 2023-02-28 ENCOUNTER — LAB (OUTPATIENT)
Dept: OTHER | Facility: HOSPITAL | Age: 73
End: 2023-02-28
Payer: MEDICARE

## 2023-02-28 ENCOUNTER — CONSULT (OUTPATIENT)
Dept: ONCOLOGY | Facility: CLINIC | Age: 73
End: 2023-02-28
Payer: MEDICARE

## 2023-02-28 ENCOUNTER — PREP FOR SURGERY (OUTPATIENT)
Dept: OTHER | Facility: HOSPITAL | Age: 73
End: 2023-02-28
Payer: MEDICARE

## 2023-02-28 VITALS
HEART RATE: 92 BPM | TEMPERATURE: 100 F | DIASTOLIC BLOOD PRESSURE: 84 MMHG | WEIGHT: 159.9 LBS | BODY MASS INDEX: 24.23 KG/M2 | HEIGHT: 68 IN | OXYGEN SATURATION: 100 % | RESPIRATION RATE: 18 BRPM | SYSTOLIC BLOOD PRESSURE: 137 MMHG

## 2023-02-28 DIAGNOSIS — R97.8 OTHER ABNORMAL TUMOR MARKERS: ICD-10-CM

## 2023-02-28 DIAGNOSIS — C23 GALLBLADDER CANCER: Primary | ICD-10-CM

## 2023-02-28 DIAGNOSIS — C23 MALIGNANT NEOPLASM OF GALLBLADDER: ICD-10-CM

## 2023-02-28 DIAGNOSIS — C23 GALLBLADDER CANCER: ICD-10-CM

## 2023-02-28 LAB
ALBUMIN SERPL-MCNC: 4.3 G/DL (ref 3.5–5.2)
ALBUMIN/GLOB SERPL: 1.2 G/DL
ALP SERPL-CCNC: 97 U/L (ref 39–117)
ALT SERPL W P-5'-P-CCNC: 21 U/L (ref 1–41)
ANION GAP SERPL CALCULATED.3IONS-SCNC: 6.8 MMOL/L (ref 5–15)
AST SERPL-CCNC: 19 U/L (ref 1–40)
BASOPHILS # BLD AUTO: 0.13 10*3/MM3 (ref 0–0.2)
BASOPHILS NFR BLD AUTO: 1.6 % (ref 0–1.5)
BILIRUB SERPL-MCNC: 1 MG/DL (ref 0–1.2)
BUN SERPL-MCNC: 16 MG/DL (ref 8–23)
BUN/CREAT SERPL: 20 (ref 7–25)
CALCIUM SPEC-SCNC: 9.6 MG/DL (ref 8.6–10.5)
CANCER AG19-9 SERPL-ACNC: <2 U/ML
CEA SERPL-MCNC: 2.08 NG/ML
CHLORIDE SERPL-SCNC: 102 MMOL/L (ref 98–107)
CO2 SERPL-SCNC: 30.2 MMOL/L (ref 22–29)
CREAT SERPL-MCNC: 0.8 MG/DL (ref 0.76–1.27)
DEPRECATED RDW RBC AUTO: 40.9 FL (ref 37–54)
EGFRCR SERPLBLD CKD-EPI 2021: 92.9 ML/MIN/1.73
EOSINOPHIL # BLD AUTO: 0.18 10*3/MM3 (ref 0–0.4)
EOSINOPHIL NFR BLD AUTO: 2.2 % (ref 0.3–6.2)
ERYTHROCYTE [DISTWIDTH] IN BLOOD BY AUTOMATED COUNT: 12.4 % (ref 12.3–15.4)
GLOBULIN UR ELPH-MCNC: 3.6 GM/DL
GLUCOSE SERPL-MCNC: 114 MG/DL (ref 65–99)
HCT VFR BLD AUTO: 41.1 % (ref 37.5–51)
HGB BLD-MCNC: 13.5 G/DL (ref 13–17.7)
IMM GRANULOCYTES # BLD AUTO: 0.03 10*3/MM3 (ref 0–0.05)
IMM GRANULOCYTES NFR BLD AUTO: 0.4 % (ref 0–0.5)
LYMPHOCYTES # BLD AUTO: 1.16 10*3/MM3 (ref 0.7–3.1)
LYMPHOCYTES NFR BLD AUTO: 14.3 % (ref 19.6–45.3)
MCH RBC QN AUTO: 29.8 PG (ref 26.6–33)
MCHC RBC AUTO-ENTMCNC: 32.8 G/DL (ref 31.5–35.7)
MCV RBC AUTO: 90.7 FL (ref 79–97)
MONOCYTES # BLD AUTO: 0.85 10*3/MM3 (ref 0.1–0.9)
MONOCYTES NFR BLD AUTO: 10.5 % (ref 5–12)
NEUTROPHILS NFR BLD AUTO: 5.76 10*3/MM3 (ref 1.7–7)
NEUTROPHILS NFR BLD AUTO: 71 % (ref 42.7–76)
NRBC BLD AUTO-RTO: 0 /100 WBC (ref 0–0.2)
PLATELET # BLD AUTO: 159 10*3/MM3 (ref 140–450)
PMV BLD AUTO: 11.5 FL (ref 6–12)
POTASSIUM SERPL-SCNC: 4.4 MMOL/L (ref 3.5–5.2)
PROT SERPL-MCNC: 7.9 G/DL (ref 6–8.5)
RBC # BLD AUTO: 4.53 10*6/MM3 (ref 4.14–5.8)
SODIUM SERPL-SCNC: 139 MMOL/L (ref 136–145)
WBC NRBC COR # BLD: 8.11 10*3/MM3 (ref 3.4–10.8)

## 2023-02-28 PROCEDURE — 82378 CARCINOEMBRYONIC ANTIGEN: CPT | Performed by: INTERNAL MEDICINE

## 2023-02-28 PROCEDURE — 36415 COLL VENOUS BLD VENIPUNCTURE: CPT

## 2023-02-28 PROCEDURE — 99205 OFFICE O/P NEW HI 60 MIN: CPT | Performed by: INTERNAL MEDICINE

## 2023-02-28 PROCEDURE — 80053 COMPREHEN METABOLIC PANEL: CPT | Performed by: INTERNAL MEDICINE

## 2023-02-28 PROCEDURE — 86301 IMMUNOASSAY TUMOR CA 19-9: CPT | Performed by: INTERNAL MEDICINE

## 2023-02-28 PROCEDURE — 85025 COMPLETE CBC W/AUTO DIFF WBC: CPT | Performed by: INTERNAL MEDICINE

## 2023-02-28 RX ORDER — CEFAZOLIN SODIUM 2 G/100ML
2 INJECTION, SOLUTION INTRAVENOUS ONCE
Status: CANCELLED | OUTPATIENT
Start: 2023-03-09 | End: 2023-02-28

## 2023-02-28 RX ORDER — ATORVASTATIN CALCIUM 40 MG/1
40 TABLET, FILM COATED ORAL NIGHTLY
COMMUNITY

## 2023-02-28 RX ORDER — LISINOPRIL 20 MG/1
20 TABLET ORAL DAILY
COMMUNITY
Start: 2023-02-12

## 2023-02-28 NOTE — PROGRESS NOTES
.     REASON FOR CONSULTATION:   Squamous cell carcinoma of the gallbladder  Provide an opinion on any further workup or treatment                             REQUESTING PHYSICIAN: Vianey Horn MD  RECORDS OBTAINED:  Records of the patient's history including those obtained from the referring provider were reviewed and summarized in detail.    HISTORY OF PRESENT ILLNESS:  The patient is a 72 y.o. year old male  who is here for follow-up with the above-mentioned history.    · Reviewed Dr. Horn's notes including from 2/21/2023 referring the patient to Dr. Alex Delgado and me for new diagnosis of squamous cell carcinoma of the gallbladder.  · Due to symptomatic cholelithiasis, laparoscopic cholecystectomy 2/11/2023, Dr. 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  5 cm.  Invades perimuscular connective tissue.  Negative for lymph-vascular invasion.  Positive for focal perineural invasion.  Cystic duct margin negative.  Grade 1.  Margins were felt to be negative per pathologist.  Pathologist stage this as a [id number] cancer.  · Dr. Horn stated the gallbladder was significantly inflamed and came out in fragments so she is not sure that staging is entirely clinically accurate.  She noted she also sent the patient to Dr. Alex Delgado of hepatobiliary surgery to determine if patient needs formal lymphadenectomy/liver resection.    Discussed with Dr. Alex Delgado who saw the patient last week.  He states although lymphadenopathy was not read on the MRI, he feels there is significant bulky LAD and patient cannot be resected at this time.  He recommends chemotherapy in hopes this will become resectable.  He requests no radiation    Patient denies hearing loss, kidney problems, neuropathy.  He does have intermittent tinnitus but states this is not bothersome      Past Medical History:   Diagnosis Date   • Gilbert syndrome    • Hyperlipidemia    • Hypertension      Past Surgical History:    Procedure Laterality Date   • CHOLECYSTECTOMY WITH INTRAOPERATIVE CHOLANGIOGRAM N/A 2/11/2023    Procedure: CHOLECYSTECTOMY LAPAROSCOPIC INTRAOPERATIVE CHOLANGIOGRAM;  Surgeon: Vianey Honr MD;  Location: [facility] OR;  Service: General;  Laterality: N/A;   • PILONIDAL CYST DRAINAGE         MEDICATIONS    Current Outpatient Medications:   •  amoxicillin-clavulanate (Augmentin) 875-125 MG per tablet, Take 1 tablet by mouth 2 (Two) Times a Day., Disp: 10 tablet, Rfl: 0  •  atorvastatin (LIPITOR) 40 MG tablet, , Disp: , Rfl:   •  HYDROcodone-acetaminophen (Norco) 5-325 MG per tablet, Take 1 tablet by mouth Every 6 (Six) Hours As Needed for Mild Pain., Disp: 12 tablet, Rfl: 0  •  lisinopril (PRINIVIL,ZESTRIL) 10 MG tablet, Take  by mouth Daily., Disp: , Rfl:   •  lisinopril (PRINIVIL,ZESTRIL) 20 MG tablet, , Disp: , Rfl:   •  polyethylene glycol (MiraLax) 17 GM/SCOOP powder, Take 17 g by mouth Daily., Disp: 850 g, Rfl: 0    ALLERGIES:   No Known Allergies    SOCIAL HISTORY:       Social History     Socioeconomic History   • Marital status:      Spouse name: lOy   Tobacco Use   • Smoking status: Former     Types: Cigarettes   Vaping Use   • Vaping Use: Never used   Substance and Sexual Activity   • Alcohol use: Yes   • Drug use: Never         FAMILY HISTORY:  No family history on file.    REVIEW OF SYSTEMS:  Review of Systems   Constitutional: Negative for activity change.   HENT: Negative for nosebleeds and trouble swallowing.    Respiratory: Negative for shortness of breath and wheezing.    Cardiovascular: Negative for chest pain and palpitations.   Gastrointestinal: Negative for constipation, diarrhea and nausea.   Genitourinary: Negative for dysuria and hematuria.   Musculoskeletal: Negative for arthralgias and myalgias.   Neurological: Negative for seizures and syncope.   Hematological: Negative for adenopathy. Does not bruise/bleed easily.   Psychiatric/Behavioral: Negative for confusion.  "             Vitals:    02/28/23 0954   BP: 137/84   Pulse: 92   Resp: 18   Temp: 100 °F (37.8 °C)   TempSrc: Temporal   SpO2: 100%   Weight: 72.5 kg (159 lb 14.4 oz)   Height: 173 cm (68.11\")   PainSc: 0-No pain     Current Status 2/28/2023   ECOG score 0      PHYSICAL EXAM:        CONSTITUTIONAL:  Vital signs reviewed.  No distress, looks comfortable.  EYES:  Conjunctiva and lids unremarkable.  PERRLA  EARS,NOSE,MOUTH,THROAT:  Ears and nose appear unremarkable.  Lips, teeth, gums appear unremarkable.  RESPIRATORY:  Normal respiratory effort.  Lungs clear to auscultation bilaterally.  CARDIOVASCULAR:  Normal S1, S2.  No murmurs rubs or gallops.  No significant lower extremity edema.  GASTROINTESTINAL: Abdomen appears unremarkable.  Nontender.  No hepatomegaly.  No splenomegaly.  LYMPHATIC:  No cervical, supraclavicular, axillary lymphadenopathy.  SKIN:  Warm.  No rashes.  PSYCHIATRIC:  Normal judgment and insight.  Normal mood and affect.         RECENT LABS:        WBC   Date Value Ref Range Status   02/28/2023 8.11 3.40 - 10.80 10*3/mm3 Final   02/12/2023 8.60 3.40 - 10.80 10*3/mm3 Final   02/11/2023 6.21 3.40 - 10.80 10*3/mm3 Final   02/10/2023 5.40 3.40 - 10.80 10*3/mm3 Final   02/09/2023 6.09 3.40 - 10.80 10*3/mm3 Final     Hemoglobin   Date Value Ref Range Status   02/28/2023 13.5 13.0 - 17.7 g/dL Final   02/12/2023 11.3 (L) 13.0 - 17.7 g/dL Final   02/11/2023 12.6 (L) 13.0 - 17.7 g/dL Final   02/10/2023 13.2 13.0 - 17.7 g/dL Final   02/09/2023 13.5 13.0 - 17.7 g/dL Final     Platelets   Date Value Ref Range Status   02/28/2023 159 140 - 450 10*3/mm3 Final   02/12/2023 150 140 - 450 10*3/mm3 Final   02/11/2023 195 140 - 450 10*3/mm3 Final   02/10/2023 215 140 - 450 10*3/mm3 Final   02/09/2023 260 140 - 450 10*3/mm3 Final       Assessment & Plan   Gallbladder cancer (HCC)  - Cancer Antigen 19-9  - Comprehensive Metabolic Panel  - NM PET/CT Skull Base to Mid Thigh  - CEA    Malignant neoplasm of gallbladder " (HCC)  - NM PET/CT Skull Base to Mid Thigh  - CEA    Other abnormal tumor markers  - CEA        Guillermo Mcmahanshoaibrubia   *Squamous cell carcinoma of the gallbladder  · Incidentally found on path review from cholecystectomy.  · Due to symptomatic cholelithiasis, laparoscopic cholecystectomy 2/11/2023, Dr. 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  5 cm.  Invades perimuscular connective tissue.  Negative for lymph-vascular invasion.  Positive for focal perineural invasion.  Cystic duct margin negative.  Grade 1.  Margins were felt to be negative per pathologist.  Pathologist stage this as a [id number] cancer.  · Dr. Horn stated the gallbladder was significantly inflamed and came out in fragments so she is not sure that staging is entirely clinically accurate.  She noted she also sent the patient to Dr. Alex Delgado of hepatobiliary surgery to determine if patient needs formal lymphadenectomy/liver resection.  · Per NCCN guidelines: Needs multiphase abdomen/pelvis CT or MRI with IV contrast and CT chest with or without IV contrast.  Consider staging laparoscopy.  If felt to be resectable, then hepatic resection with lymphadenectomy with or without bile duct excision for malignant involvement.  If felt to be unresectable, then MSI/MMR testing, TMB testing, additional molecular testing to include an TRK.  Options include systemic therapy (preferred), clinical trial (preferred), palliative XRT, or best supportive care.  · Per up-to-date guidelines, for a T2 cancer found incidentally after gallbladder surgery: Reexploration and extended cholecystectomy are also indicated.  Re-exploration identifies residual tumor in 40 to 75% of cases, and a high likelihood of liver involvement and jacki metastasis with T2 disease.  Re-resection significantly increases the likelihood of long-term DFS in patients with T2 disease.  In many series, 5-year OS increased from 25-40%, up to % with aggressive  surgery.  Up-to-date authors recommend re-resection to resect at least a 2 cm margin of the liver bed and perform portal/hepatoduodenal ligament lymphadenectomy.  · Up-to-date authors recommend adjuvant therapy for all patients with completely resected T1b or higher or node positive or margin positive gallbladder cancer.  Up-to-date authors note [facility] suggests adjuvant therapy for all patients with resected gallbladder cancer.  Up-to-date authors note there is no consensus on the optimal adjuvant therapy but they recommend 6 months of postoperative chemotherapy alone with Xeloda monotherapy for most patients.  They note if Xeloda is chosen they start treatment with no more than 1500 mg total dose twice daily.  They note an alternative approaches combining concomitant 5-FU based chemoradiotherapy with 4 months of systemic chemotherapy especially with patients with margin positive disease.  They note in this situation chemotherapy can be with Xeloda monotherapy or Xeloda plus Gemzar.  They note for patients who received chemo XRT plus adjuvant chemo, there is no consensus on sequencing.  In general they state it is preferable to start with chemotherapy first, complete 3 to 4 months of systemic exposure because this will avoid XRT for patients who are destined to develop early distant disease.  · Dr. Alex Delgado, surgical oncology, states although LAD not read on MRI 2/10/2023, he sees bulky LAD on his imaging review.  He states this is currently not resectable due to the bulky LAD and requests chemotherapy in hopes this will become resectable.  He requests avoiding XRT.  · Per the Topaz 1 trial, plan Gemzar 1000 mg/m2 D1, D8 and cisplatin 25 mg/m2 D1, D8 and durvalumab 1500 mg flat dose.  Cycles every 3 weeks.  Up to 8 cycles.  This can be followed by durvalumab 1500 mg flat dose every 4 weeks.  Durvalumab was associated with a longer PFS and a higher objective response rate (26.7% versus 18.7% without durvalumab).  The  Topaz 1 trial included previously untreated unresectable locally advanced or metastatic intrahepatic or extrahepatic cholangiocarcinoma or gallbladder cancer.  Also, cisplatin and Gemzar is a common regimen in squamous cell carcinoma of a more common primary (lung).  Therefore, I feel cisplatin Gemzar durvalumab is the best regimen for this currently unresectable squamous cell carcinoma of the gallbladder.  · Dr. Delgado is recommended a prechemotherapy PET scan and then to check a PET scan again after roughly 2 months of chemotherapy to see if there has been enough improvement to see if this is resectable.    *Not needed now but, regarding surveillance after completing adjuvant therapy:  · Up-to-date authors state there is no agreed-upon surveillance but after completing adjuvant therapy they follow CEA and CA 19 9 every 3 to 4 months for the first 2 years after surgery, then every 6 months for 1 more year with imaging only as clinically indicated.  · NCCN guidelines state consider imaging every 3 to 6 months x 2 years, then every 6 to 12 months for up to 5 years or as clinically indicated and consider CEA and CA 19-9 as clinically indicated.    Plan  · Referral to Dr. Arabella Horn for port placement  · PET scan in the next few days.  See me after that.  · Likely plan another PET scan after roughly 2 months of chemotherapy.   · CA 19-9 and CEA and CMP today  · Up-to-date and NCCN recommend CA 19-9 and CEA tumor markers for gallbladder cancer (both normal after cholecystectomy)  · I did briefly discuss cisplatin Gemzar durvalumab with him.  I asked our office to go ahead and get this approved.  Dosing for the regimen is in my note, and a red font color    62 minutes.  Total time.  Same day.

## 2023-02-28 NOTE — PROGRESS NOTES
On 2/27/2023, I prepared for 32 minutes   for the upcoming face-to-face encounter on 2/28/2023.    I reviewed and summarized records.  I also reviewed guidelines on treatment and formulated a tentative plan        Diagnosis: Squamous cell carcinoma of the gallbladder

## 2023-03-01 ENCOUNTER — PATIENT ROUNDING (BHMG ONLY) (OUTPATIENT)
Dept: ONCOLOGY | Facility: CLINIC | Age: 73
End: 2023-03-01
Payer: MEDICARE

## 2023-03-01 ENCOUNTER — TELEPHONE (OUTPATIENT)
Dept: ONCOLOGY | Facility: CLINIC | Age: 73
End: 2023-03-01
Payer: MEDICARE

## 2023-03-01 ENCOUNTER — DOCUMENTATION (OUTPATIENT)
Dept: ONCOLOGY | Facility: CLINIC | Age: 73
End: 2023-03-01
Payer: MEDICARE

## 2023-03-01 NOTE — TELEPHONE ENCOUNTER
----- Message from Vu Benedict II, MD sent at 2/28/2023  6:46 PM EST -----  Hi,    It looks like he is getting his PET scan on 3/2/2023.    Please arrange an appointment with me, 2 units, at lunchtime, noon, on 3/7/2023.  I realize he prefers [facility] but we really have to do wherever we can get him in for these quick   appointments.      April, please go ahead and get his chemotherapy approved (we will not plan on giving chemotherapy the day he sees me, but please try to go ahead and get it approved).  Please let me know when gets approved      Thanks!

## 2023-03-02 ENCOUNTER — HOSPITAL ENCOUNTER (OUTPATIENT)
Dept: PET IMAGING | Facility: HOSPITAL | Age: 73
Discharge: HOME | End: 2023-03-02
Payer: MEDICARE

## 2023-03-02 DIAGNOSIS — C23 GALLBLADDER CANCER: ICD-10-CM

## 2023-03-02 DIAGNOSIS — C23 MALIGNANT NEOPLASM OF GALLBLADDER: ICD-10-CM

## 2023-03-02 LAB — GLUCOSE BLDC GLUCOMTR-MCNC: 100 MG/DL (ref 70–130)

## 2023-03-02 PROCEDURE — 0 FLUDEOXYGLUCOSE F18 SOLUTION: Performed by: INTERNAL MEDICINE

## 2023-03-02 PROCEDURE — 78815 PET IMAGE W/CT SKULL-THIGH: CPT

## 2023-03-02 PROCEDURE — 82962 GLUCOSE BLOOD TEST: CPT

## 2023-03-02 PROCEDURE — A9552 F18 FDG: HCPCS | Performed by: INTERNAL MEDICINE

## 2023-03-02 RX ADMIN — FLUDEOXYGLUCOSE F18 1 DOSE: 300 INJECTION INTRAVENOUS at 07:14

## 2023-03-06 NOTE — PROGRESS NOTES
.     REASON FOR FOLLOWUP :   Squamous cell carcinoma of the gallbladder    HISTORY OF PRESENT ILLNESS:  The patient is a 72 y.o. year old male  who is here for follow-up with the above-mentioned history.    No new problems.  Continues to have intermittent mild tinnitus which is nonbothersome.  Denies hearing loss.  Has chemo teaching tomorrow.  Port is also scheduled.  Tentative start date is Monday.    Past Medical History:   Diagnosis Date   • Gilbert syndrome    • Hyperlipidemia    • Hypertension      Past Surgical History:   Procedure Laterality Date   • CHOLECYSTECTOMY WITH INTRAOPERATIVE CHOLANGIOGRAM N/A 2/11/2023    Procedure: CHOLECYSTECTOMY LAPAROSCOPIC INTRAOPERATIVE CHOLANGIOGRAM;  Surgeon: Vianey Horn MD;  Location: [facility] OR;  Service: General;  Laterality: N/A;   • PILONIDAL CYST DRAINAGE         MEDICATIONS    Current Outpatient Medications:   •  atorvastatin (LIPITOR) 40 MG tablet, , Disp: , Rfl:   •  lisinopril (PRINIVIL,ZESTRIL) 10 MG tablet, Take  by mouth Daily., Disp: , Rfl:   •  lisinopril (PRINIVIL,ZESTRIL) 20 MG tablet, , Disp: , Rfl:   •  amoxicillin-clavulanate (Augmentin) 875-125 MG per tablet, Take 1 tablet by mouth 2 (Two) Times a Day., Disp: 10 tablet, Rfl: 0  •  HYDROcodone-acetaminophen (Norco) 5-325 MG per tablet, Take 1 tablet by mouth Every 6 (Six) Hours As Needed for Mild Pain., Disp: 12 tablet, Rfl: 0  •  polyethylene glycol (MiraLax) 17 GM/SCOOP powder, Take 17 g by mouth Daily., Disp: 850 g, Rfl: 0    ALLERGIES:   No Known Allergies    SOCIAL HISTORY:       Social History     Socioeconomic History   • Marital status:      Spouse name: Oly   Tobacco Use   • Smoking status: Former     Types: Cigarettes   Vaping Use   • Vaping Use: Never used   Substance and Sexual Activity   • Alcohol use: Yes   • Drug use: Never         FAMILY HISTORY:  No family history on file.    REVIEW OF SYSTEMS:  Review of Systems   Constitutional: Negative for activity change.  "  HENT: Negative for nosebleeds and trouble swallowing.    Respiratory: Negative for shortness of breath and wheezing.    Cardiovascular: Negative for chest pain and palpitations.   Gastrointestinal: Negative for constipation, diarrhea and nausea.   Genitourinary: Negative for dysuria and hematuria.   Musculoskeletal: Negative for arthralgias and myalgias.   Neurological: Negative for seizures and syncope.   Hematological: Negative for adenopathy. Does not bruise/bleed easily.   Psychiatric/Behavioral: Negative for confusion.              Vitals:    03/07/23 1145   BP: 143/88   Pulse: 68   Resp: 16   Temp: 98.7 °F (37.1 °C)   TempSrc: Temporal   SpO2: 98%   Weight: 72.9 kg (160 lb 12.8 oz)   Height: 173 cm (68.11\")   PainSc: 0-No pain     Current Status 3/7/2023   ECOG score 0      PHYSICAL EXAM:          CONSTITUTIONAL:  Vital signs reviewed.  No distress, looks comfortable.  EYES:  Conjunctiva and lids unremarkable.  PERRLA  EARS,NOSE,MOUTH,THROAT:  Ears and nose appear unremarkable.  Lips, teeth, gums appear unremarkable.  RESPIRATORY:  Normal respiratory effort.  Lungs clear to auscultation bilaterally.  CARDIOVASCULAR:  Normal S1, S2.  No murmurs rubs or gallops.  No significant lower extremity edema.  GASTROINTESTINAL: Abdomen appears unremarkable.  Nontender.  No hepatomegaly.  No splenomegaly.  LYMPHATIC:  No cervical, supraclavicular, axillary lymphadenopathy.  SKIN:  Warm.  No rashes.  PSYCHIATRIC:  Normal judgment and insight.  Normal mood and affect.       RECENT LABS:        WBC   Date Value Ref Range Status   03/07/2023 8.40 3.40 - 10.80 10*3/mm3 Final   02/28/2023 8.11 3.40 - 10.80 10*3/mm3 Final   02/12/2023 8.60 3.40 - 10.80 10*3/mm3 Final   02/11/2023 6.21 3.40 - 10.80 10*3/mm3 Final   02/10/2023 5.40 3.40 - 10.80 10*3/mm3 Final   02/09/2023 6.09 3.40 - 10.80 10*3/mm3 Final     Hemoglobin   Date Value Ref Range Status   03/07/2023 13.8 13.0 - 17.7 g/dL Final   02/28/2023 13.5 13.0 - 17.7 g/dL Final "   02/12/2023 11.3 (L) 13.0 - 17.7 g/dL Final   02/11/2023 12.6 (L) 13.0 - 17.7 g/dL Final   02/10/2023 13.2 13.0 - 17.7 g/dL Final   02/09/2023 13.5 13.0 - 17.7 g/dL Final     Platelets   Date Value Ref Range Status   03/07/2023 85 (L) 140 - 450 10*3/mm3 Final   02/28/2023 159 140 - 450 10*3/mm3 Final   02/12/2023 150 140 - 450 10*3/mm3 Final   02/11/2023 195 140 - 450 10*3/mm3 Final   02/10/2023 215 140 - 450 10*3/mm3 Final   02/09/2023 260 140 - 450 10*3/mm3 Final       Assessment & Plan   There are no diagnoses linked to this encounter.      Guillermo Salgado   *Squamous cell carcinoma of the gallbladder  · Incidentally found on path review from cholecystectomy.  · Due to symptomatic cholelithiasis, laparoscopic cholecystectomy 2/11/2023, Dr. 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  5 cm.  Invades perimuscular connective tissue.  Negative for lymph-vascular invasion.  Positive for focal perineural invasion.  Cystic duct margin negative.  Grade 1.  Margins were felt to be negative per pathologist.  Pathologist stage this as a [id number] cancer.  · Dr. Horn stated the gallbladder was significantly inflamed and came out in fragments so she is not sure that staging is entirely clinically accurate.  She noted she also sent the patient to Dr. Alex Delgado of hepatobiliary surgery to determine if patient needs formal lymphadenectomy/liver resection.  · Per NCCN guidelines: Needs multiphase abdomen/pelvis CT or MRI with IV contrast and CT chest with or without IV contrast.  Consider staging laparoscopy.  If felt to be resectable, then hepatic resection with lymphadenectomy with or without bile duct excision for malignant involvement.  If felt to be unresectable, then MSI/MMR testing, TMB testing, additional molecular testing to include an TRK.  Options include systemic therapy (preferred), clinical trial (preferred), palliative XRT, or best supportive care.  · Per up-to-date  guidelines, for a T2 cancer found incidentally after gallbladder surgery: Reexploration and extended cholecystectomy are also indicated.  Re-exploration identifies residual tumor in 40 to 75% of cases, and a high likelihood of liver involvement and jacki metastasis with T2 disease.  Re-resection significantly increases the likelihood of long-term DFS in patients with T2 disease.  In many series, 5-year OS increased from 25-40%, up to % with aggressive surgery.  Up-to-date authors recommend re-resection to resect at least a 2 cm margin of the liver bed and perform portal/hepatoduodenal ligament lymphadenectomy.  · Up-to-date authors recommend adjuvant therapy for all patients with completely resected T1b or higher or node positive or margin positive gallbladder cancer.  Up-to-date authors note [facility] suggests adjuvant therapy for all patients with resected gallbladder cancer.  Up-to-date authors note there is no consensus on the optimal adjuvant therapy but they recommend 6 months of postoperative chemotherapy alone with Xeloda monotherapy for most patients.  They note if Xeloda is chosen they start treatment with no more than 1500 mg total dose twice daily.  They note an alternative approaches combining concomitant 5-FU based chemoradiotherapy with 4 months of systemic chemotherapy especially with patients with margin positive disease.  They note in this situation chemotherapy can be with Xeloda monotherapy or Xeloda plus Gemzar.  They note for patients who received chemo XRT plus adjuvant chemo, there is no consensus on sequencing.  In general they state it is preferable to start with chemotherapy first, complete 3 to 4 months of systemic exposure because this will avoid XRT for patients who are destined to develop early distant disease.  · Dr. Alex Delgado, surgical oncology, states although LAD not read on MRI 2/10/2023, he sees bulky LAD on his imaging review.  He states this is currently not resectable due to  the bulky LAD and requests chemotherapy in hopes this will become resectable.  He requests avoiding XRT.  · Per the Topaz 1 trial, plan Gemzar 1000 mg/m2 D1, D8 and cisplatin 25 mg/m2 D1, D8 and durvalumab 1500 mg flat dose.  Cycles every 3 weeks.  Up to 8 cycles.  This can be followed by durvalumab 1500 mg flat dose every 4 weeks.  Durvalumab was associated with a longer PFS and a higher objective response rate (26.7% versus 18.7% without durvalumab).  The Topaz 1 trial included previously untreated unresectable locally advanced or metastatic intrahepatic or extrahepatic cholangiocarcinoma or gallbladder cancer.  Also, cisplatin and Gemzar is a common regimen in squamous cell carcinoma of a more common primary (lung).  Therefore, I feel cisplatin Gemzar durvalumab is the best regimen for this currently unresectable squamous cell carcinoma of the gallbladder.  · Dr. Delgado is recommended a prechemotherapy PET scan and then to check a PET scan again after roughly 2 months of chemotherapy to see if there has been enough improvement to see if this is resectable.  · PET 3/2/2023: Intensely active tanner hepatis nodes, and ill-defined hypodensity in the liver parenchyma adjacent to gallbladder fossa, corresponding to areas of apparent masslike invasion seen on recent MRI, felt to be concerning for malignant invasion of the liver.  A few subcentimeter hypodense lesions in the liver too small to characterize.  Could not exclude peritoneal invasion/carcinomatosis.  Suggestion of a patent active thyroid nodule.  Radiologist recommended correlation with thyroid ultrasound.  · Pretreatment CA 19-9 and CEA normal  · 3/13/2023: Begin Gemzar 1000 mg/m2 D1, D8 and cisplatin 25 mg/m2 D1, D8 and durvalumab 1500 mg flat dose.  Cycles every 3 weeks.  Up to 8 cycles.  This can be followed by durvalumab 1500 mg flat dose every 4 weeks   · (if this cannot be resected, then the Topaz 1 trial continue durvalumab until progression or  toxicity.  If this can be resected, likely would not continue single agent durvalumab).    *Suggestion of PET active thyroid nodule on PET 3/2/2023.  · PET 3/2/2023: Radiologist recommended thyroid ultrasound.    · 3/7/2023: Ordered thyroid ultrasound (but patient understands even if something looks concerning, addressed the gallbladder cancer first and only work on the thyroid nodule if gallbladder cancer treatment has completed and is felt to be in remission    *Not needed now but, regarding surveillance after completing adjuvant therapy:  · Up-to-date authors state there is no agreed-upon surveillance but after completing adjuvant therapy they follow CEA and CA 19 9 every 3 to 4 months for the first 2 years after surgery, then every 6 months for 1 more year with imaging only as clinically indicated.  · NCCN guidelines state consider imaging every 3 to 6 months x 2 years, then every 6 to 12 months for up to 5 years or as clinically indicated and consider CEA and CA 19-9 as clinically indicated.    Plan  · Dr. Arabella duarte planned  3/9/2023  · Chemo is arranged to start on 3/13/2023.  · Chemo teaching is tomorrow  · Gemzar D1, D8 and cisplatin D1, D8 and durvalumab 1500 mg flat dose.   · Cycles every 3 weeks.  Up to 8 cycles.    · (Plan PET after roughly 2 months (after 3 cycles) to see if there is enough improvement to see if this is resectable.  · This can be followed by durvalumab 1500 mg flat dose every 4 weeks (single agent durvalumab continues until progression or toxicity if this is not resectable after chemotherapy).    · Arranged appointments through first 3 cycles including PET scan (around 5/8/2023) after cycle 3 and appointment with me and Dr. Delgado after PET scan, that same week.  Would be due for cycle 4 on 5/15/2023, so would like decisions made regarding surgery prior to that    40 minutes.  Total time.  Same day.

## 2023-03-07 ENCOUNTER — LAB (OUTPATIENT)
Dept: OTHER | Facility: HOSPITAL | Age: 73
End: 2023-03-07
Payer: MEDICARE

## 2023-03-07 ENCOUNTER — OFFICE VISIT (OUTPATIENT)
Dept: ONCOLOGY | Facility: CLINIC | Age: 73
End: 2023-03-07
Payer: MEDICARE

## 2023-03-07 VITALS
SYSTOLIC BLOOD PRESSURE: 143 MMHG | RESPIRATION RATE: 16 BRPM | OXYGEN SATURATION: 98 % | HEIGHT: 68 IN | BODY MASS INDEX: 24.37 KG/M2 | HEART RATE: 68 BPM | TEMPERATURE: 98.7 F | WEIGHT: 160.8 LBS | DIASTOLIC BLOOD PRESSURE: 88 MMHG

## 2023-03-07 DIAGNOSIS — C23 MALIGNANT NEOPLASM OF GALLBLADDER: Primary | ICD-10-CM

## 2023-03-07 DIAGNOSIS — C23 MALIGNANT NEOPLASM OF GALLBLADDER: ICD-10-CM

## 2023-03-07 DIAGNOSIS — C23 GALLBLADDER CANCER: Primary | ICD-10-CM

## 2023-03-07 LAB
BASOPHILS # BLD AUTO: 0.08 10*3/MM3 (ref 0–0.2)
BASOPHILS NFR BLD AUTO: 1 % (ref 0–1.5)
CLUMPED PLATELETS: PRESENT
DEPRECATED RDW RBC AUTO: 40.7 FL (ref 37–54)
EOSINOPHIL # BLD AUTO: 0.08 10*3/MM3 (ref 0–0.4)
EOSINOPHIL NFR BLD AUTO: 1 % (ref 0.3–6.2)
ERYTHROCYTE [DISTWIDTH] IN BLOOD BY AUTOMATED COUNT: 12.4 % (ref 12.3–15.4)
HCT VFR BLD AUTO: 41.6 % (ref 37.5–51)
HGB BLD-MCNC: 13.8 G/DL (ref 13–17.7)
IMM GRANULOCYTES # BLD AUTO: 0.03 10*3/MM3 (ref 0–0.05)
IMM GRANULOCYTES NFR BLD AUTO: 0.4 % (ref 0–0.5)
LYMPHOCYTES # BLD AUTO: 1.44 10*3/MM3 (ref 0.7–3.1)
LYMPHOCYTES NFR BLD AUTO: 17.1 % (ref 19.6–45.3)
MCH RBC QN AUTO: 30 PG (ref 26.6–33)
MCHC RBC AUTO-ENTMCNC: 33.2 G/DL (ref 31.5–35.7)
MCV RBC AUTO: 90.4 FL (ref 79–97)
MONOCYTES # BLD AUTO: 0.98 10*3/MM3 (ref 0.1–0.9)
MONOCYTES NFR BLD AUTO: 11.7 % (ref 5–12)
NEUTROPHILS NFR BLD AUTO: 5.79 10*3/MM3 (ref 1.7–7)
NEUTROPHILS NFR BLD AUTO: 68.8 % (ref 42.7–76)
NRBC BLD AUTO-RTO: 0 /100 WBC (ref 0–0.2)
PLATELET # BLD AUTO: ABNORMAL 10*3/UL
PMV BLD AUTO: 11.6 FL (ref 6–12)
RBC # BLD AUTO: 4.6 10*6/MM3 (ref 4.14–5.8)
RBC MORPH BLD: NORMAL
WBC MORPH BLD: NORMAL
WBC NRBC COR # BLD: 8.4 10*3/MM3 (ref 3.4–10.8)

## 2023-03-07 PROCEDURE — 85025 COMPLETE CBC W/AUTO DIFF WBC: CPT | Performed by: INTERNAL MEDICINE

## 2023-03-07 PROCEDURE — 36415 COLL VENOUS BLD VENIPUNCTURE: CPT

## 2023-03-07 PROCEDURE — 99215 OFFICE O/P EST HI 40 MIN: CPT | Performed by: INTERNAL MEDICINE

## 2023-03-07 PROCEDURE — 85007 BL SMEAR W/DIFF WBC COUNT: CPT | Performed by: INTERNAL MEDICINE

## 2023-03-08 ENCOUNTER — OFFICE VISIT (OUTPATIENT)
Dept: ONCOLOGY | Facility: CLINIC | Age: 73
End: 2023-03-08
Payer: MEDICARE

## 2023-03-08 ENCOUNTER — HOSPITAL ENCOUNTER (OUTPATIENT)
Dept: ULTRASOUND IMAGING | Facility: HOSPITAL | Age: 73
Discharge: HOME | End: 2023-03-08
Admitting: INTERNAL MEDICINE
Payer: MEDICARE

## 2023-03-08 VITALS
SYSTOLIC BLOOD PRESSURE: 144 MMHG | HEART RATE: 65 BPM | BODY MASS INDEX: 24.41 KG/M2 | OXYGEN SATURATION: 99 % | HEIGHT: 68 IN | DIASTOLIC BLOOD PRESSURE: 79 MMHG | TEMPERATURE: 98.7 F | RESPIRATION RATE: 18 BRPM | WEIGHT: 161.1 LBS

## 2023-03-08 DIAGNOSIS — C23 GALLBLADDER CANCER: ICD-10-CM

## 2023-03-08 DIAGNOSIS — C23 GALLBLADDER CANCER: Primary | ICD-10-CM

## 2023-03-08 PROCEDURE — 99215 OFFICE O/P EST HI 40 MIN: CPT | Performed by: NURSE PRACTITIONER

## 2023-03-08 PROCEDURE — 76536 US EXAM OF HEAD AND NECK: CPT

## 2023-03-08 RX ORDER — OLANZAPINE 5 MG/1
5 TABLET ORAL NIGHTLY
Qty: 48 TABLET | Refills: 0 | Status: SHIPPED | OUTPATIENT
Start: 2023-03-08

## 2023-03-08 RX ORDER — ONDANSETRON HYDROCHLORIDE 8 MG/1
8 TABLET, FILM COATED ORAL 3 TIMES DAILY PRN
Qty: 30 TABLET | Refills: 5 | Status: SHIPPED | OUTPATIENT
Start: 2023-03-08

## 2023-03-08 NOTE — PROGRESS NOTES
[facility] Hematology/Oncology Treatment Plan Summary    Name: Guillermo Salgado  [facility]# [id number]  MD: Dr. Benedict    Diagnosis:     ICD-10-CM ICD-9-CM   1. Gallbladder cancer (HCC)  C23 156.0       Goal of treatment: disease control    Treatment Medication(s):   1. Gemzar - Day 1 and day 8  2. Cisplatin- day 1 and 8  3. Durvalumab- day 1     Frequency: 21 day cycles    Number of cycles: up to 8 (then possible Durvalumab every 4 weeks)    Starting on: 3/13/2023    Repeat after ~2-3 cycles: CT Scan    Items for home use: Thermometer    Rx written for: [x] Nausea    [] Pre-Treatment   olanzapine 5mg nightly on days as directed and ondansetron 8 mg by mouth every 8 hours as needed for nausea    Notes:     Next Steps: PORT     Completing Provider: TY Christy           Date/time: 03/08/2023      Please note: You will be seen by a provider frequently with your treatment plan. This plan may change depending on many factors, if so, this will be discussed with you by your physician.  Last update 03/2022.

## 2023-03-08 NOTE — PROGRESS NOTES
TREATMENT  PREPARATION    Guillermo Salgado  [id number]  1950    Chief Complaint: Treatment preparation and needs assessment    History of present illness:  Guillermo Salgado is a 72 y.o. year old male who is here today for treatment preparation and needs assessment.  The patient has been diagnosed with   Encounter Diagnosis   Name Primary?   • Gallbladder cancer (HCC) Yes    and is scheduled to begin treatment with:     Oncology History:    Oncology/Hematology History   Gallbladder cancer (HCC)   2/28/2023 Initial Diagnosis    Gallbladder cancer (HCC)     3/13/2023 -  Chemotherapy    OP HEPATOBILIARY CISplatin + Gemcitabine Days 1,8 / Durvalumab Q21D         The current medication list and allergy list were reviewed and reconciled.     Past Medical History, Past Surgical History, Social History, Family History have been reviewed and are without significant changes except as mentioned.    Physical Exam:    Vitals:    03/08/23 1250   BP: 144/79   Pulse: 65   Resp: 18   Temp: 98.7 °F (37.1 °C)   SpO2: 99%     Vitals:    03/08/23 1250   PainSc: 0-No pain        ECOG score: 0             Physical Exam  HENT:      Head: Normocephalic and atraumatic.   Eyes:      Extraocular Movements: Extraocular movements intact.      Conjunctiva/sclera: Conjunctivae normal.   Pulmonary:      Effort: Pulmonary effort is normal. No respiratory distress.   Neurological:      General: No focal deficit present.      Mental Status: He is alert and oriented to person, place, and time.   Psychiatric:         Mood and Affect: Mood normal.         Behavior: Behavior normal.           NEEDS ASSESSMENTS    Genetics  The patient's new diagnosis and family history have been reviewed for genetic counseling needs. A genetic referral is not recommended.     Psychosocial and Barriers to care  The patient has completed a PHQ-9 Depression Screening and the Distress Thermometer (DT) today.  PHQ-9 results show PHQ-2 Total Score: 0 PHQ-9 Total Score: PHQ-9  Total Score: 0     The patient scored their distress today as Distress Level: 3 on a scale of 0-10 with 0 being no distress and 10 being extreme distress. Problems marked by the patient as being an issue for them within the last week include   .      Results were reviewed along with psychosocial resources offered by our cancer center.  Our Supportive Oncology team will be flagged for a score of 4 or above, and a same day call will be made for a score of 9 or 10.  A mental health referral is offered at that time. Patients who score less than 4 have been educated on our support services and can be referred to our  upon request.  The patient will not be referred to our .       Nutrition  The patient has completed the malnutrition screening today. They scored Malnutrition Screening Tool  Have you recently lost weight without trying?  If yes, how much weight have you lost?: 0--> No  Have you been eating poorly because of a decreased appetite?: 0--> No  MST score: 0   with a score of 0-1 meaning not at risk in a score of 2 or greater meaning at risk.  Patients with a score of 3 or higher will be referred to our oncology dietitian for support. Patients beginning at risk treatment regimens or who have dietary concerns will also be referred to our oncology dietitian. The patient will not be referred.    Functional Assessment  Persons who are age 70 or greater will be screened for qualification of a comprehensive geriatric assessment by our survivorship nurse practitioner.  Older adults with cancer face unique challenges. These may include an increased risk of drug reactions, financial burdens, and caregiver stress. The patient scored G8 Questionnaire  Has food intake declined over the past 3 months due to loss of appetite, digestive problems, chewing or swallowing difficulties?: No decrease in food intake  Weight loss during the last 3 months: Weight loss > 3 kg / 6/6 lbs  Mobility: Goes  out  Neuropsychological Problems: No psychological problems  Body Mass Index (BMI (weight in kg) / (height in m2)): BMI 23 and > 23  Takes more than 3 medications a day: Yes, takes 1 or 2 prescription drugs per day  In comparison with other people of the same age, how does the patient consider his/her health status?: As good  Age: < 80  Total Score: 13 . Patients scoring 14 or lower will referred for an older adult functional assessment with the survivorship advanced practice registered nurse to ensure all needed support is provided as patients plan for their treatments. The patient will not be referred.    Intravenous Access Assessment  The patient and I discussed planned intravenous chemo/biotherapy as well as other IV treatments that are often needed throughout the course of treatment. These may include, but are not limited to blood transfusions, antibiotics, and IV hydration. Discussed that depending on selected treatment and vein assessment, patient may require venous access device (VAD) which could include but not limited to a Mediport or PICC line. Risks and benefits of VADs reviewed. The patient will be treated via Port.    Reproductive/Sexual Activity   People should avoid becoming pregnant and should not get a partner pregnant while undergoing chemo/biotherapy.  People of childbearing age should use effective contraception during active therapy. The best recommendation for all people is to use a barrier method for a minimum of 1 week after the last infusion of chemo/biotherapy to prevent your partner being exposed to byproducts from treatment medications in bodily fluids. Effective contraception should be discussed with your oncology team to make sure it is safe to take based on your diagnosis. Possible options include oral contraceptives, barrier methods. Chemo/biotherapy can change your ability to reproduce children in the future.  There are options for fertility preservation. The patient will not be  "referred.    Advanced Care Planning  Advance Care Planning   The patient and I discussed advanced care planning, \"Conversations that Matter\".   This service is offered for development of advance directives with a certified ACP facilitator.  The patient does not have an up-to-date advanced directive. This document is not on file with our office. The patient is not interested in an appointment with one of our facilitators to create or update their advanced directives.     Smoking cessation  Tobacco Use: Medium Risk   • Smoking Tobacco Use: Former   • Smokeless Tobacco Use: Unknown   • Passive Exposure: Not on file       Patient and I discussed their tobacco use history. Referral will not be made for smoking cessation.      Palliative Care  When appropriate, the patient and I discussed the availability palliative care services and when appropriate Hospice care. Palliative care is not the same as Hospice care which was explained to the patient.NOT APPLICABLE.    Survivorship   When appropriate, we discussed that we will refer the patient to survivorship clinic to discuss next steps following completion of planned treatment.  Reviewed this visit will include assessment of your physical, psychological, functional, and spiritual needs as a survivor and the need at attend this visit when scheduled.    TREATMENT EDUCATION    Today I met with the patient to discuss the chemo/biotherapy regimen recommended for treatment of Gallbladder cancer (HCC)  - OLANZapine (zyPREXA) 5 MG tablet  - ondansetron (ZOFRAN) 8 MG tablet  .  The patient was given explanation of treatment premed side effects including office policy that prohibits patients to drive if sedating medications are administered, MD explanation given regarding benefits, side effects, toxicities and goals of treatment.  The patient received a Chemotherapy/Biotherapy Plan Summary including diagnosis and explanation of specific treatment plan.    SIDE EFFECTS:  Common side " effects were discussed with the patient and/or significant other.  Discussion included where applicable hair loss/discoloration, anemia/fatigue, infection/chills/fever, appetite, bleeding risk/precautions, constipation, diarrhea, mouth sores, taste alteration, loss of appetite, nausea/vomiting, peripheral neuropathy, skin/nail changes, rash, muscle aches/weakness, photosensitivity, weight gain/loss, hearing loss, dizziness, menopausal symptoms, menstrual irregularity, sterility, high blood pressure, heart damage, liver damage, lung damage, kidney damage, DVT/PE risk, fluid retention, pleural/pericardial effusion, somnolence, electrolyte/LFT imbalance, vein exercises and/or the possible need for vascular access/port placement.  The patient was advised that although uncommon, leakage of an infused medication from the vein or venous access device may lead to skin breakdown and/or other tissue damage.  The patient was advised that he/she may have pain, bleeding, and/or bruising from the insertion of a needle in their vein or venous access device (port).  The patient was further advised that, in spite of proper technique, infection with redness and irritation may rarely occur at the site where the needle was inserted.  The patient was advised that if complications occur, additional medical treatment is available.  Finally, where applicable we have reviewed rare but potential immune mediated side effects including shortness of breath, cough, chest pain (pneumonitis), abdominal pain, diarrhea (colitis), thyroiditis (hypothyroid or hyperthyroid), hepatitis and liver dysfunction, nephritis and renal dysfunction.    Discussion also included side effects specific to drugs in the treatment plan, specifically:    Treatment Plans     Name Type Plan Dates Plan Provider         Active    OP HEPATOBILIARY CISplatin + Gemcitabine Days 1,8 / Durvalumab Q21D ONCOLOGY TREATMENT  3/6/2023 - Present Vu Benedict II, MD                     Questions answered and additional information discussed on topics including:  Anemia, Thrombocytopenia, Neutropenia, Nutrition and appetite changes, Constipation, Diarrhea, Nausea & vomiting, Mouth sores, Alopecia, Nervous system changes, Pain, Skin & nail changes and Organ toxicities       Assessment and Plan:    Diagnoses and all orders for this visit:    1. Gallbladder cancer (HCC) (Primary)  -     OLANZapine (zyPREXA) 5 MG tablet; Take 1 tablet by mouth Every Night. Take on day 2, 3, and 4 and Days 9, 10, 11 after chemotherapy.  Dispense: 48 tablet; Refill: 0  -     ondansetron (ZOFRAN) 8 MG tablet; Take 1 tablet by mouth 3 (Three) Times a Day As Needed for Nausea or Vomiting.  Dispense: 30 tablet; Refill: 5      No orders of the defined types were placed in this encounter.        1. The patient and I have reviewed their diagnosis and scheduled treatment plan. Needs assessment was completed where applicable including genetics, psychosocial needs, barriers to care, VAD evaluation, advanced care planning, survivorship, and palliative care services where indicated. Referrals have been ordered as appropriate based upon evaluation today and patient desires.   2. Chemo/biotherapy teaching was completed today and consent obtained. See separate documentation for further details.  3. Adequate time was given to answer questions.  Patient made aware of their care team members and contact information if they have questions or problems throughout the treatment course.  4. Discussion held and written information provided describing frequency of office visits and ongoing monitoring throughout the treatment plan.     5. Reviewed with patient any prescribed medication sent to pharmacy.  Education provided regarding proper storage, safe handling, and proper disposal of unused medication.  6. Proper handling of body fluids and waste discussed and written information provided.  7. If appropriate, patient had pretreatment labs  drawn today.    Learning assessment completed at initial patient encounter. See separate flowsheet. Chemo/biotherapy education comprehension assessed at today's visit.    I spent 60 minutes caring for Guillermo on this date of service. This time includes time spent by me in the following activities: preparing for the visit, reviewing tests, obtaining and/or reviewing a separately obtained history, performing a medically appropriate examination and/or evaluation, counseling and educating the patient/family/caregiver, ordering medications, tests, or procedures, referring and communicating with other health care professionals, documenting information in the medical record, independently interpreting results and communicating that information with the patient/family/caregiver and care coordination.     Viviana Bell, APRN   03/15/23

## 2023-03-09 ENCOUNTER — APPOINTMENT (OUTPATIENT)
Dept: GENERAL RADIOLOGY | Facility: HOSPITAL | Age: 73
End: 2023-03-09
Payer: MEDICARE

## 2023-03-09 ENCOUNTER — ANESTHESIA EVENT (OUTPATIENT)
Dept: PERIOP | Facility: HOSPITAL | Age: 73
End: 2023-03-09
Payer: MEDICARE

## 2023-03-09 ENCOUNTER — ANESTHESIA (OUTPATIENT)
Dept: PERIOP | Facility: HOSPITAL | Age: 73
End: 2023-03-09
Payer: MEDICARE

## 2023-03-09 ENCOUNTER — HOSPITAL ENCOUNTER (OUTPATIENT)
Facility: HOSPITAL | Age: 73
Setting detail: HOSPITAL OUTPATIENT SURGERY
Discharge: HOME | End: 2023-03-09
Attending: STUDENT IN AN ORGANIZED HEALTH CARE EDUCATION/TRAINING PROGRAM | Admitting: STUDENT IN AN ORGANIZED HEALTH CARE EDUCATION/TRAINING PROGRAM
Payer: MEDICARE

## 2023-03-09 VITALS
RESPIRATION RATE: 18 BRPM | HEART RATE: 73 BPM | OXYGEN SATURATION: 100 % | DIASTOLIC BLOOD PRESSURE: 78 MMHG | TEMPERATURE: 98 F | SYSTOLIC BLOOD PRESSURE: 125 MMHG

## 2023-03-09 DIAGNOSIS — C23 GALLBLADDER CANCER: ICD-10-CM

## 2023-03-09 PROCEDURE — 25010000002 FENTANYL CITRATE (PF) 100 MCG/2ML SOLUTION: Performed by: NURSE ANESTHETIST, CERTIFIED REGISTERED

## 2023-03-09 PROCEDURE — 77001 FLUOROGUIDE FOR VEIN DEVICE: CPT | Performed by: STUDENT IN AN ORGANIZED HEALTH CARE EDUCATION/TRAINING PROGRAM

## 2023-03-09 PROCEDURE — 25010000002 CEFAZOLIN IN DEXTROSE 2-4 GM/100ML-% SOLUTION: Performed by: STUDENT IN AN ORGANIZED HEALTH CARE EDUCATION/TRAINING PROGRAM

## 2023-03-09 PROCEDURE — C1788 PORT, INDWELLING, IMP: HCPCS | Performed by: STUDENT IN AN ORGANIZED HEALTH CARE EDUCATION/TRAINING PROGRAM

## 2023-03-09 PROCEDURE — 36561 INSERT TUNNELED CV CATH: CPT | Performed by: STUDENT IN AN ORGANIZED HEALTH CARE EDUCATION/TRAINING PROGRAM

## 2023-03-09 PROCEDURE — 25010000002 PROPOFOL 500 MG/50ML EMULSION: Performed by: NURSE ANESTHETIST, CERTIFIED REGISTERED

## 2023-03-09 PROCEDURE — 25010000002 ONDANSETRON PER 1 MG: Performed by: NURSE ANESTHETIST, CERTIFIED REGISTERED

## 2023-03-09 PROCEDURE — 76937 US GUIDE VASCULAR ACCESS: CPT | Performed by: STUDENT IN AN ORGANIZED HEALTH CARE EDUCATION/TRAINING PROGRAM

## 2023-03-09 PROCEDURE — 25010000002 HEPARIN (PORCINE) PER 1000 UNITS: Performed by: STUDENT IN AN ORGANIZED HEALTH CARE EDUCATION/TRAINING PROGRAM

## 2023-03-09 PROCEDURE — 25010000002 PROPOFOL 10 MG/ML EMULSION: Performed by: NURSE ANESTHETIST, CERTIFIED REGISTERED

## 2023-03-09 PROCEDURE — 76000 FLUOROSCOPY <1 HR PHYS/QHP: CPT

## 2023-03-09 DEVICE — PRT INTRO VASC/INTERV VORTEX FILL/HL DETACH/POLYURET/CATH 8F: Type: IMPLANTABLE DEVICE | Site: CHEST | Status: FUNCTIONAL

## 2023-03-09 RX ORDER — ONDANSETRON 2 MG/ML
INJECTION INTRAMUSCULAR; INTRAVENOUS AS NEEDED
Status: DISCONTINUED | OUTPATIENT
Start: 2023-03-09 | End: 2023-03-09 | Stop reason: SURG

## 2023-03-09 RX ORDER — HYDRALAZINE HYDROCHLORIDE 20 MG/ML
5 INJECTION INTRAMUSCULAR; INTRAVENOUS
Status: DISCONTINUED | OUTPATIENT
Start: 2023-03-09 | End: 2023-03-09 | Stop reason: HOSPADM

## 2023-03-09 RX ORDER — MIDAZOLAM HYDROCHLORIDE 1 MG/ML
0.5 INJECTION INTRAMUSCULAR; INTRAVENOUS
Status: DISCONTINUED | OUTPATIENT
Start: 2023-03-09 | End: 2023-03-09 | Stop reason: HOSPADM

## 2023-03-09 RX ORDER — DIPHENHYDRAMINE HYDROCHLORIDE 50 MG/ML
12.5 INJECTION INTRAMUSCULAR; INTRAVENOUS
Status: DISCONTINUED | OUTPATIENT
Start: 2023-03-09 | End: 2023-03-09 | Stop reason: HOSPADM

## 2023-03-09 RX ORDER — FLUMAZENIL 0.1 MG/ML
0.2 INJECTION INTRAVENOUS AS NEEDED
Status: DISCONTINUED | OUTPATIENT
Start: 2023-03-09 | End: 2023-03-09 | Stop reason: HOSPADM

## 2023-03-09 RX ORDER — BUPIVACAINE HYDROCHLORIDE AND EPINEPHRINE 5; 5 MG/ML; UG/ML
INJECTION, SOLUTION EPIDURAL; INTRACAUDAL; PERINEURAL AS NEEDED
Status: DISCONTINUED | OUTPATIENT
Start: 2023-03-09 | End: 2023-03-09 | Stop reason: HOSPADM

## 2023-03-09 RX ORDER — SODIUM CHLORIDE 0.9 % (FLUSH) 0.9 %
3 SYRINGE (ML) INJECTION EVERY 12 HOURS SCHEDULED
Status: DISCONTINUED | OUTPATIENT
Start: 2023-03-09 | End: 2023-03-09 | Stop reason: HOSPADM

## 2023-03-09 RX ORDER — LABETALOL HYDROCHLORIDE 5 MG/ML
5 INJECTION, SOLUTION INTRAVENOUS
Status: DISCONTINUED | OUTPATIENT
Start: 2023-03-09 | End: 2023-03-09 | Stop reason: HOSPADM

## 2023-03-09 RX ORDER — SODIUM CHLORIDE, SODIUM LACTATE, POTASSIUM CHLORIDE, CALCIUM CHLORIDE 600; 310; 30; 20 MG/100ML; MG/100ML; MG/100ML; MG/100ML
9 INJECTION, SOLUTION INTRAVENOUS CONTINUOUS
Status: DISCONTINUED | OUTPATIENT
Start: 2023-03-09 | End: 2023-03-09 | Stop reason: HOSPADM

## 2023-03-09 RX ORDER — FAMOTIDINE 10 MG/ML
20 INJECTION, SOLUTION INTRAVENOUS ONCE
Status: COMPLETED | OUTPATIENT
Start: 2023-03-09 | End: 2023-03-09

## 2023-03-09 RX ORDER — FENTANYL CITRATE 50 UG/ML
50 INJECTION, SOLUTION INTRAMUSCULAR; INTRAVENOUS
Status: DISCONTINUED | OUTPATIENT
Start: 2023-03-09 | End: 2023-03-09 | Stop reason: HOSPADM

## 2023-03-09 RX ORDER — FENTANYL CITRATE 50 UG/ML
INJECTION, SOLUTION INTRAMUSCULAR; INTRAVENOUS AS NEEDED
Status: DISCONTINUED | OUTPATIENT
Start: 2023-03-09 | End: 2023-03-09 | Stop reason: SURG

## 2023-03-09 RX ORDER — LIDOCAINE HYDROCHLORIDE 10 MG/ML
0.5 INJECTION, SOLUTION EPIDURAL; INFILTRATION; INTRACAUDAL; PERINEURAL ONCE AS NEEDED
Status: DISCONTINUED | OUTPATIENT
Start: 2023-03-09 | End: 2023-03-09 | Stop reason: HOSPADM

## 2023-03-09 RX ORDER — NALOXONE HCL 0.4 MG/ML
0.2 VIAL (ML) INJECTION AS NEEDED
Status: DISCONTINUED | OUTPATIENT
Start: 2023-03-09 | End: 2023-03-09 | Stop reason: HOSPADM

## 2023-03-09 RX ORDER — SODIUM CHLORIDE 0.9 % (FLUSH) 0.9 %
3-10 SYRINGE (ML) INJECTION AS NEEDED
Status: DISCONTINUED | OUTPATIENT
Start: 2023-03-09 | End: 2023-03-09 | Stop reason: HOSPADM

## 2023-03-09 RX ORDER — ONDANSETRON 2 MG/ML
4 INJECTION INTRAMUSCULAR; INTRAVENOUS ONCE AS NEEDED
Status: DISCONTINUED | OUTPATIENT
Start: 2023-03-09 | End: 2023-03-09 | Stop reason: HOSPADM

## 2023-03-09 RX ORDER — PROPOFOL 10 MG/ML
INJECTION, EMULSION INTRAVENOUS AS NEEDED
Status: DISCONTINUED | OUTPATIENT
Start: 2023-03-09 | End: 2023-03-09 | Stop reason: SURG

## 2023-03-09 RX ORDER — DROPERIDOL 2.5 MG/ML
0.62 INJECTION, SOLUTION INTRAMUSCULAR; INTRAVENOUS
Status: DISCONTINUED | OUTPATIENT
Start: 2023-03-09 | End: 2023-03-09 | Stop reason: HOSPADM

## 2023-03-09 RX ORDER — HYDROCODONE BITARTRATE AND ACETAMINOPHEN 7.5; 325 MG/1; MG/1
1 TABLET ORAL ONCE AS NEEDED
Status: DISCONTINUED | OUTPATIENT
Start: 2023-03-09 | End: 2023-03-09 | Stop reason: HOSPADM

## 2023-03-09 RX ORDER — PROMETHAZINE HYDROCHLORIDE 25 MG/1
25 SUPPOSITORY RECTAL ONCE AS NEEDED
Status: DISCONTINUED | OUTPATIENT
Start: 2023-03-09 | End: 2023-03-09 | Stop reason: HOSPADM

## 2023-03-09 RX ORDER — EPHEDRINE SULFATE 50 MG/ML
5 INJECTION, SOLUTION INTRAVENOUS ONCE AS NEEDED
Status: DISCONTINUED | OUTPATIENT
Start: 2023-03-09 | End: 2023-03-09 | Stop reason: HOSPADM

## 2023-03-09 RX ORDER — OXYCODONE AND ACETAMINOPHEN 7.5; 325 MG/1; MG/1
1 TABLET ORAL EVERY 4 HOURS PRN
Status: DISCONTINUED | OUTPATIENT
Start: 2023-03-09 | End: 2023-03-09 | Stop reason: HOSPADM

## 2023-03-09 RX ORDER — LIDOCAINE HYDROCHLORIDE 20 MG/ML
INJECTION, SOLUTION EPIDURAL; INFILTRATION; INTRACAUDAL; PERINEURAL AS NEEDED
Status: DISCONTINUED | OUTPATIENT
Start: 2023-03-09 | End: 2023-03-09 | Stop reason: SURG

## 2023-03-09 RX ORDER — HYDROMORPHONE HYDROCHLORIDE 1 MG/ML
0.5 INJECTION, SOLUTION INTRAMUSCULAR; INTRAVENOUS; SUBCUTANEOUS
Status: DISCONTINUED | OUTPATIENT
Start: 2023-03-09 | End: 2023-03-09 | Stop reason: HOSPADM

## 2023-03-09 RX ORDER — PROMETHAZINE HYDROCHLORIDE 25 MG/1
25 TABLET ORAL ONCE AS NEEDED
Status: DISCONTINUED | OUTPATIENT
Start: 2023-03-09 | End: 2023-03-09 | Stop reason: HOSPADM

## 2023-03-09 RX ORDER — CEFAZOLIN SODIUM 2 G/100ML
2 INJECTION, SOLUTION INTRAVENOUS ONCE
Status: COMPLETED | OUTPATIENT
Start: 2023-03-09 | End: 2023-03-09

## 2023-03-09 RX ORDER — IPRATROPIUM BROMIDE AND ALBUTEROL SULFATE 2.5; .5 MG/3ML; MG/3ML
3 SOLUTION RESPIRATORY (INHALATION) ONCE AS NEEDED
Status: DISCONTINUED | OUTPATIENT
Start: 2023-03-09 | End: 2023-03-09 | Stop reason: HOSPADM

## 2023-03-09 RX ADMIN — PROPOFOL 100 MCG/KG/MIN: 10 INJECTION, EMULSION INTRAVENOUS at 12:57

## 2023-03-09 RX ADMIN — FENTANYL CITRATE 50 MCG: 50 INJECTION, SOLUTION INTRAMUSCULAR; INTRAVENOUS at 12:54

## 2023-03-09 RX ADMIN — CEFAZOLIN SODIUM 2 G: 2 INJECTION, SOLUTION INTRAVENOUS at 12:48

## 2023-03-09 RX ADMIN — PROPOFOL 80 MG: 10 INJECTION, EMULSION INTRAVENOUS at 12:57

## 2023-03-09 RX ADMIN — FAMOTIDINE 20 MG: 10 INJECTION INTRAVENOUS at 11:13

## 2023-03-09 RX ADMIN — LIDOCAINE HYDROCHLORIDE 100 MG: 20 INJECTION, SOLUTION EPIDURAL; INFILTRATION; INTRACAUDAL; PERINEURAL at 12:57

## 2023-03-09 RX ADMIN — ONDANSETRON 4 MG: 2 INJECTION INTRAMUSCULAR; INTRAVENOUS at 13:11

## 2023-03-09 RX ADMIN — FENTANYL CITRATE 50 MCG: 50 INJECTION, SOLUTION INTRAMUSCULAR; INTRAVENOUS at 13:14

## 2023-03-09 RX ADMIN — Medication 3 ML: at 11:22

## 2023-03-09 RX ADMIN — SODIUM CHLORIDE, POTASSIUM CHLORIDE, SODIUM LACTATE AND CALCIUM CHLORIDE 9 ML/HR: 600; 310; 30; 20 INJECTION, SOLUTION INTRAVENOUS at 11:21

## 2023-03-09 NOTE — H&P
General Surgery History and Physical      Summary:    Guillermo Salgado is a 72 y.o. gentleman who recently underwent laparoscopic cholecystectomy with cholangiogram.  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  He met with oncology and plans to initiate adjuvant chemotherapy.  Plans for port placement today.  All risks (including bleeding, infection, damage to surrounding structures including pneumothorax), benefits, and alternatives were explained to the patient who agreed and wished to proceed.    Referring Provider: Vianey Horn MD    Chief Complaint:    Need for IV access    History of Present Illness:    Guillermo Salgado is a 72 y.o. gentleman who recently underwent inpatient cholecystectomy with cholangiogram for most likely acute appendicitis.  Pathology returned as squamous cell carcinoma of the gallbladder.  He has seen oncology and hepatobiliary surgery and presents today for port placement.    Past Medical History:   Past Medical History:   Diagnosis Date   • Cancer (HCC)     gallbladder   • Gilbert syndrome    • Hyperlipidemia    • Hypertension       Past Surgical History:    Past Surgical History:   Procedure Laterality Date   • CHOLECYSTECTOMY WITH INTRAOPERATIVE CHOLANGIOGRAM N/A 02/11/2023    Procedure: CHOLECYSTECTOMY LAPAROSCOPIC INTRAOPERATIVE CHOLANGIOGRAM;  Surgeon: Vianey Horn MD;  Location: [facility];  Service: General;  Laterality: N/A;   • COLONOSCOPY     • PILONIDAL CYST DRAINAGE       Family History:    Family History   Problem Relation Age of Onset   • Malig Hyperthermia Neg Hx      Social History:    Social History     Socioeconomic History   • Marital status:      Spouse name: Oly   Tobacco Use   • Smoking status: Former     Types: Cigarettes   Vaping Use   • Vaping Use: Never used   Substance and Sexual Activity   • Alcohol use: Yes   • Drug use: Never   • Sexual activity: Defer     Allergies:   No Known Allergies    Medications:   No  current facility-administered medications for this encounter.    Radiology/Endoscopy:    • PET scan reviewed    Labs:    • Labs reviewed    Review of Systems:   Influenza-like illness: no fever, no  cough, no  sore throat, no  body aches, no loss of sense of taste or smell, no known exposure to person with Covid-19.  Constitutional: Negative for fevers or chills  HENT: Negative for hearing loss or runny nose  Eyes: Negative for vision changes or scleral icterus  Respiratory: Negative for cough or shortness of breath  Cardiovascular: Negative for chest pain or heart palpitations  Gastrointestinal: Negative for abdominal pain, nausea, vomiting, constipation, melena, or hematochezia  Genitourinary: Negative for hematuria or dysuria  Musculoskeletal: Negative for joint swelling or gait instability  Neurologic: Negative for tremors or seizures  Psychiatric: Negative for suicidal ideations or depression  All other systems reviewed and negative    Physical Exam:   • Constitutional: Well-developed well-nourished, no acute distress  • Eyes: Conjunctiva normal, sclera nonicteric  • ENMT: Hearing grossly normal, oral mucosa moist  • Neck: Supple, no palpable mass, trachea midline  • Respiratory: Clear to auscultation, normal inspiratory effort  • Cardiovascular: Regular rate, no peripheral edema, no jugular venous distention  • Gastrointestinal: Soft, nontender  • Skin:  Warm, dry, no rash on visualized skin surfaces  • Musculoskeletal: Symmetric strength, normal gait  • Psychiatric: Alert and oriented ×3, normal affect       Vianey Horn M.D.  General and Endoscopic Surgery  [facility]    [address], [address]  [place], [place], [address]  P: [phone]  F: [phone]

## 2023-03-09 NOTE — ANESTHESIA PREPROCEDURE EVALUATION
Anesthesia Evaluation     NPO Solid Status: > 8 hours             Airway   Mallampati: II  TM distance: >3 FB  Neck ROM: full  No difficulty expected  Dental      Comment: Missing upper front teeth    Pulmonary - normal exam   (+) a smoker Former,   Cardiovascular - normal exam    (+) hypertension, hyperlipidemia,       Neuro/Psych  GI/Hepatic/Renal/Endo      Musculoskeletal     Abdominal    Substance History      OB/GYN          Other      history of cancer                      Anesthesia Plan    ASA 2     MAC     intravenous induction     Anesthetic plan, risks, benefits, and alternatives have been provided, discussed and informed consent has been obtained with: patient.        CODE STATUS:

## 2023-03-09 NOTE — ANESTHESIA POSTPROCEDURE EVALUATION
Patient: Guillermo Salgado    Procedure Summary     Date: 03/09/23 Room / Location:  [facility] /  [facility]    Anesthesia Start: 1251 Anesthesia Stop: 1337    Procedure: INSERTION VENOUS ACCESS DEVICE Diagnosis:       Gallbladder cancer (HCC)      (Gallbladder cancer (HCC) [C23])    Surgeons: Vianey Horn MD Provider: Jaswant Estrella MD    Anesthesia Type: MAC ASA Status: 2          Anesthesia Type: MAC    Vitals  Vitals Value Taken Time   /77 03/09/23 1345   Temp 36.7 °C (98 °F) 03/09/23 1336   Pulse 75 03/09/23 1345   Resp 18 03/09/23 1345   SpO2 100 % 03/09/23 1345           Post Anesthesia Care and Evaluation    Patient location during evaluation: bedside  Patient participation: complete - patient participated  Level of consciousness: awake  Pain management: adequate    Airway patency: patent  Anesthetic complications: No anesthetic complications  PONV Status: controlled  Cardiovascular status: acceptable  Respiratory status: acceptable  Hydration status: acceptable    Comments: --------------------            03/09/23               1345     --------------------   BP:       114/77     Pulse:      75       Resp:       18       Temp:                SpO2:      100%     --------------------

## 2023-03-09 NOTE — PROGRESS NOTES
.     REASON FOR FOLLOWUP :   Squamous cell carcinoma of the gallbladder    HISTORY OF PRESENT ILLNESS:  The patient is a 72 y.o. year old male  who is here for follow-up with the above-mentioned history.    He returns today for follow-up and evaluation prior to initiation of Gemzar, cisplatin, and durvalumab.  Chemo/immunotherapy education completed 3/8/2023.  Port placed by Dr. Horn 3/9/2023.  He had an ultrasound of his thyroid completed 3/8/2023.      He is seen today with his wife present.  He had some questions about how to take his Zyprexa, which we reviewed.  Continues with intermittent tinnitus which is very mild and non-bothersome.  Otherwise, he has no questions and is ready to proceed with his first chemo/immunotherapy.  Denies fever or chills.  Denies nausea or vomiting.  Denies signs or symptoms of peripheral neuropathy.  Denies hearing loss.    Past Medical History:   Diagnosis Date   • Cancer (HCC)     gallbladder   • Gilbert syndrome    • Hyperlipidemia    • Hypertension      Past Surgical History:   Procedure Laterality Date   • CHOLECYSTECTOMY WITH INTRAOPERATIVE CHOLANGIOGRAM N/A 02/11/2023    Procedure: CHOLECYSTECTOMY LAPAROSCOPIC INTRAOPERATIVE CHOLANGIOGRAM;  Surgeon: Vianey Horn MD;  Location: [facility];  Service: General;  Laterality: N/A;   • COLONOSCOPY     • PILONIDAL CYST DRAINAGE     • VENOUS ACCESS DEVICE (PORT) INSERTION N/A 3/9/2023    Procedure: INSERTION VENOUS ACCESS DEVICE;  Surgeon: Vianey Horn MD;  Location: [facility];  Service: General;  Laterality: N/A;       MEDICATIONS    Current Outpatient Medications:   •  atorvastatin (LIPITOR) 40 MG tablet, Take 1 tablet by mouth Every Night., Disp: , Rfl:   •  lisinopril (PRINIVIL,ZESTRIL) 20 MG tablet, Take 1 tablet by mouth Daily., Disp: , Rfl:   •  OLANZapine (zyPREXA) 5 MG tablet, Take 1 tablet by mouth Every Night. Take on day 2, 3, and 4 and Days 9, 10, 11 after chemotherapy., Disp: 48 tablet, Rfl:  "0  •  ondansetron (ZOFRAN) 8 MG tablet, Take 1 tablet by mouth 3 (Three) Times a Day As Needed for Nausea or Vomiting., Disp: 30 tablet, Rfl: 5    ALLERGIES:   No Known Allergies    SOCIAL HISTORY:       Social History     Socioeconomic History   • Marital status:      Spouse name: Oly   Tobacco Use   • Smoking status: Former     Types: Cigarettes   Vaping Use   • Vaping Use: Never used   Substance and Sexual Activity   • Alcohol use: Yes   • Drug use: Never   • Sexual activity: Defer         FAMILY HISTORY:  Family History   Problem Relation Age of Onset   • Malig Hyperthermia Neg Hx      REVIEW OF SYSTEMS:  Review of Systems   Constitutional: Negative for activity change.   HENT: Negative for nosebleeds and trouble swallowing.    Respiratory: Negative for shortness of breath and wheezing.    Cardiovascular: Negative for chest pain and palpitations.   Gastrointestinal: Negative for constipation, diarrhea and nausea.   Genitourinary: Negative for dysuria and hematuria.   Musculoskeletal: Negative for arthralgias and myalgias.   Neurological: Negative for seizures and syncope.   Hematological: Negative for adenopathy. Does not bruise/bleed easily.   Psychiatric/Behavioral: Negative for confusion.          Vitals:    03/13/23 0823   BP: 149/91   Pulse: 95   Resp: 18   Temp: 97.9 °F (36.6 °C)   TempSrc: Temporal   SpO2: 95%   Weight: 71.9 kg (158 lb 8 oz)   Height: 173 cm (68.11\")   PainSc: 0-No pain     Current Status 3/13/2023   ECOG score 0      PHYSICAL EXAM:      CONSTITUTIONAL:  Vital signs reviewed.  No distress, looks comfortable.  EYES:  Conjunctiva and lids unremarkable.  PERRLA  EARS,NOSE,MOUTH,THROAT:  Ears and nose appear unremarkable.  Lips, teeth, gums appear unremarkable.  RESPIRATORY:  Normal respiratory effort.  Lungs clear to auscultation bilaterally.  CARDIOVASCULAR:  Normal S1, S2.  No murmurs rubs or gallops.  No significant lower extremity edema.  GASTROINTESTINAL: Abdomen appears " unremarkable.  Nontender.  No hepatomegaly.  No splenomegaly.  LYMPHATIC:  No cervical, supraclavicular, axillary lymphadenopathy.  SKIN:  Warm.  No rashes.  PSYCHIATRIC:  Normal judgment and insight.  Normal mood and affect.       RECENT LABS:        WBC   Date Value Ref Range Status   03/13/2023 7.30 3.40 - 10.80 10*3/mm3 Final   03/07/2023 8.40 3.40 - 10.80 10*3/mm3 Final   02/28/2023 8.11 3.40 - 10.80 10*3/mm3 Final   02/12/2023 8.60 3.40 - 10.80 10*3/mm3 Final   02/11/2023 6.21 3.40 - 10.80 10*3/mm3 Final   02/10/2023 5.40 3.40 - 10.80 10*3/mm3 Final   02/09/2023 6.09 3.40 - 10.80 10*3/mm3 Final     Hemoglobin   Date Value Ref Range Status   03/13/2023 13.0 13.0 - 17.7 g/dL Final   03/07/2023 13.8 13.0 - 17.7 g/dL Final   02/28/2023 13.5 13.0 - 17.7 g/dL Final   02/12/2023 11.3 (L) 13.0 - 17.7 g/dL Final   02/11/2023 12.6 (L) 13.0 - 17.7 g/dL Final   02/10/2023 13.2 13.0 - 17.7 g/dL Final   02/09/2023 13.5 13.0 - 17.7 g/dL Final     Platelets   Date Value Ref Range Status   03/13/2023 244 140 - 450 10*3/mm3 Final   03/07/2023   Corrected     Comment:     Plt clumps. Results removed for inaccuracy per  Code  Corrected result. Previous result was 85 10*3/mm3 on 3/7/2023 at 1148 EST.   02/28/2023 159 140 - 450 10*3/mm3 Final   02/12/2023 150 140 - 450 10*3/mm3 Final   02/11/2023 195 140 - 450 10*3/mm3 Final   02/10/2023 215 140 - 450 10*3/mm3 Final   02/09/2023 260 140 - 450 10*3/mm3 Final       Assessment & Plan   There are no diagnoses linked to this encounter.      Guillermo Salgado   *Squamous cell carcinoma of the gallbladder  · Incidentally found on path review from cholecystectomy.  · Due to symptomatic cholelithiasis, laparoscopic cholecystectomy 2/11/2023, Dr. 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  5 cm.  Invades perimuscular connective tissue.  Negative for lymph-vascular invasion.  Positive for focal perineural invasion.  Cystic duct margin negative.  Grade 1.   Margins were felt to be negative per pathologist.  Pathologist stage this as a [id number] cancer.  · Dr. Horn stated the gallbladder was significantly inflamed and came out in fragments so she is not sure that staging is entirely clinically accurate.  She noted she also sent the patient to Dr. Alex Delgado of hepatobiliary surgery to determine if patient needs formal lymphadenectomy/liver resection.  · Per NCCN guidelines: Needs multiphase abdomen/pelvis CT or MRI with IV contrast and CT chest with or without IV contrast.  Consider staging laparoscopy.  If felt to be resectable, then hepatic resection with lymphadenectomy with or without bile duct excision for malignant involvement.  If felt to be unresectable, then MSI/MMR testing, TMB testing, additional molecular testing to include an TRK.  Options include systemic therapy (preferred), clinical trial (preferred), palliative XRT, or best supportive care.  · Per up-to-date guidelines, for a T2 cancer found incidentally after gallbladder surgery: Reexploration and extended cholecystectomy are also indicated.  Re-exploration identifies residual tumor in 40 to 75% of cases, and a high likelihood of liver involvement and jacki metastasis with T2 disease.  Re-resection significantly increases the likelihood of long-term DFS in patients with T2 disease.  In many series, 5-year OS increased from 25-40%, up to % with aggressive surgery.  Up-to-date authors recommend re-resection to resect at least a 2 cm margin of the liver bed and perform portal/hepatoduodenal ligament lymphadenectomy.  · Up-to-date authors recommend adjuvant therapy for all patients with completely resected T1b or higher or node positive or margin positive gallbladder cancer.  Up-to-date authors note [facility] suggests adjuvant therapy for all patients with resected gallbladder cancer.  Up-to-date authors note there is no consensus on the optimal adjuvant therapy but they recommend 6 months of  postoperative chemotherapy alone with Xeloda monotherapy for most patients.  They note if Xeloda is chosen they start treatment with no more than 1500 mg total dose twice daily.  They note an alternative approaches combining concomitant 5-FU based chemoradiotherapy with 4 months of systemic chemotherapy especially with patients with margin positive disease.  They note in this situation chemotherapy can be with Xeloda monotherapy or Xeloda plus Gemzar.  They note for patients who received chemo XRT plus adjuvant chemo, there is no consensus on sequencing.  In general they state it is preferable to start with chemotherapy first, complete 3 to 4 months of systemic exposure because this will avoid XRT for patients who are destined to develop early distant disease.  · Dr. Alex Delgado, surgical oncology, states although LAD not read on MRI 2/10/2023, he sees bulky LAD on his imaging review.  He states this is currently not resectable due to the bulky LAD and requests chemotherapy in hopes this will become resectable.  He requests avoiding XRT.  · Per the Topaz 1 trial, plan Gemzar 1000 mg/m2 D1, D8 and cisplatin 25 mg/m2 D1, D8 and durvalumab 1500 mg flat dose.  Cycles every 3 weeks.  Up to 8 cycles.  This can be followed by durvalumab 1500 mg flat dose every 4 weeks.  Durvalumab was associated with a longer PFS and a higher objective response rate (26.7% versus 18.7% without durvalumab).  The Topaz 1 trial included previously untreated unresectable locally advanced or metastatic intrahepatic or extrahepatic cholangiocarcinoma or gallbladder cancer.  Also, cisplatin and Gemzar is a common regimen in squamous cell carcinoma of a more common primary (lung).  Therefore, I feel cisplatin Gemzar durvalumab is the best regimen for this currently unresectable squamous cell carcinoma of the gallbladder.  · Dr. Delgado is recommended a prechemotherapy PET scan and then to check a PET scan again after roughly 2 months of chemotherapy  to see if there has been enough improvement to see if this is resectable.  · PET 3/2/2023: Intensely active tanner hepatis nodes, and ill-defined hypodensity in the liver parenchyma adjacent to gallbladder fossa, corresponding to areas of apparent masslike invasion seen on recent MRI, felt to be concerning for malignant invasion of the liver.  A few subcentimeter hypodense lesions in the liver too small to characterize.  Could not exclude peritoneal invasion/carcinomatosis.  Suggestion of a patent active thyroid nodule.  Radiologist recommended correlation with thyroid ultrasound.  · Pretreatment CA 19-9 and CEA normal  · 3/13/2023: Begin Gemzar 1000 mg/m2 D1, D8 and cisplatin 25 mg/m2 D1, D8 and durvalumab 1500 mg flat dose.  Cycles every 3 weeks.  Up to 8 cycles.  This can be followed by durvalumab 1500 mg flat dose every 4 weeks   · (if this cannot be resected, then the Topaz 1 trial continue durvalumab until progression or toxicity.  If this can be resected, likely would not continue single agent durvalumab).  · 3/13/2023 cycle 1 day 1 Gemzar, cisplatin, durvalumab.     *Suggestion of PET active thyroid nodule on PET 3/2/2023.  · PET 3/2/2023: Radiologist recommended thyroid ultrasound.    · 3/7/2023: Ordered thyroid ultrasound (but patient understands even if something looks concerning, addressed the gallbladder cancer first and only work on the thyroid nodule if gallbladder cancer treatment has completed and is felt to be in remission  · 3/8/2023: Thyroid ultrasound with right thyroid lobe measuring 5.2 x 2.1 x 1.8 cm in the left thyroid lobe measuring 3.5 x 1.6 x 1.4 cm.  There are multiple thyroid nodules including a 1.5 x 1.1 x 1.1 cm solid-appearing well marginated wider than tall nodule.  TI-RADS-3.  Consider 1 year follow-up if clinically indicated.    · Repeat thyroid ultrasound 1 year from last, approximately 3/2024.    *Not needed now but, regarding surveillance after completing adjuvant  therapy:  · Up-to-date authors state there is no agreed-upon surveillance but after completing adjuvant therapy they follow CEA and CA 19 9 every 3 to 4 months for the first 2 years after surgery, then every 6 months for 1 more year with imaging only as clinically indicated.  · NCCN guidelines state consider imaging every 3 to 6 months x 2 years, then every 6 to 12 months for up to 5 years or as clinically indicated and consider CEA and CA 19-9 as clinically indicated.    Plan:   · Proceed with cycle 1 day 1 Gemzar, cisplatin, durvalumab today.  · Gemzar D1, D8 and cisplatin D1, D8 and durvalumab 1500 mg flat dose.   · Cycles every 3 weeks.  Up to 8 cycles.    · (Plan PET after roughly 2 months (after 3 cycles) to see if there is enough improvement to see if this is resectable.  · This can be followed by durvalumab 1500 mg flat dose every 4 weeks (single agent durvalumab continues until progression or toxicity if this is not resectable after chemotherapy).    · Arranged appointments through first 3 cycles including PET scan (around 5/8/2023) after cycle 3 and appointment with Dr. Benedict and Dr. Delgado after PET scan, that same week.  Would be due for cycle 4 on 5/15/2023, so would like decisions made regarding surgery prior to that  · Port placement by Dr. Horn.  · Anticipate repeat thyroid ultrasound 1 year from last, approximately 3/2024.     The patient is on high risk medication that requires close monitoring for toxicity.  Ultrasound thyroid reviewed with the patient today.    Georgia Julian, APRN  03/13/23

## 2023-03-09 NOTE — OP NOTE
PREOPERATIVE DIAGNOSIS:  SCC of the gallbladder    POSTOPERATIVE DIAGNOSIS AND FINDINGS:  same    PROCEDURE:  Right internal jugular Powerport placement with fluoroscopic guidance    DATE OF PROCEDURE:   3/9/2023     SURGEON:  Vianey Walton MD    ASSISTANT:  none    ANESTHESIA:  MAC    EBL:  Minimal    SPECIMEN:  none    DESCRIPTION:  All risks, benefits, and alternatives were explained and informed consent was obtained. The patient was taken to the operating room, transferred onto the operating room table in the supine position, underwent monitored anesthesia, and was prepped and draped in the usual sterile manner.  Half percent marcaine with epinephrine was administered.  The right internal jugular vein was accessed with an 18-gauge needle under ultrasound guidance, and a guidewire was inserted under fluoroscopic guidance into the superior vena cava.  A subcutaneous pocket was then created with electrocautery on the right chest wall. The port was placed in the pocket and secured in two points with 2-0 prolene. The tubing was then tunneled from the subcutaneous pocket to the location of the wire in the neck. The vein dilator and sheath were introduced, and the dilator and guidewire were removed.  The port tubing was inserted to the cavoatrial junction, and position of tip was confirmed with fluoroscopic guidance. The port was connected to the tubing and the cap was secured. Venous blood was easily aspirated from the port, and the port was flushed with heparinized saline. There was good hemostasis noted. The skin was then closed with 3-0 Vicryl deep dermal and 4-0 Vicryl subcuticular sutures. Dermabond was placed over the wound. The patient tolerated the procedure well, and was transferred to the recovery area in stable condition. Recovery room CXR was ordered to confirm placement.    VIANEY WALTON M.D.  General and Endoscopic Surgery  [facility]    [address], [address]  [place], [place],  [address]  P: [phone]  F: [phone]

## 2023-03-13 ENCOUNTER — INFUSION (OUTPATIENT)
Dept: ONCOLOGY | Facility: HOSPITAL | Age: 73
End: 2023-03-13
Payer: MEDICARE

## 2023-03-13 ENCOUNTER — OFFICE VISIT (OUTPATIENT)
Dept: ONCOLOGY | Facility: CLINIC | Age: 73
End: 2023-03-13
Payer: MEDICARE

## 2023-03-13 VITALS
BODY MASS INDEX: 24.02 KG/M2 | RESPIRATION RATE: 18 BRPM | WEIGHT: 158.5 LBS | SYSTOLIC BLOOD PRESSURE: 149 MMHG | TEMPERATURE: 97.9 F | OXYGEN SATURATION: 95 % | HEART RATE: 95 BPM | HEIGHT: 68 IN | DIASTOLIC BLOOD PRESSURE: 91 MMHG

## 2023-03-13 VITALS — BODY MASS INDEX: 24.34 KG/M2 | WEIGHT: 160.6 LBS

## 2023-03-13 DIAGNOSIS — C23 GALLBLADDER CANCER: Primary | ICD-10-CM

## 2023-03-13 DIAGNOSIS — Z45.9 ENCOUNTER FOR MANAGEMENT OF IMPLANTED DEVICE: ICD-10-CM

## 2023-03-13 DIAGNOSIS — Z79.899 HIGH RISK MEDICATION USE: ICD-10-CM

## 2023-03-13 LAB
ALBUMIN SERPL-MCNC: 4.2 G/DL (ref 3.5–5.2)
ALBUMIN/GLOB SERPL: 1.3 G/DL (ref 1.1–2.4)
ALP SERPL-CCNC: 82 U/L (ref 38–116)
ALT SERPL W P-5'-P-CCNC: 15 U/L (ref 0–41)
ANION GAP SERPL CALCULATED.3IONS-SCNC: 11.9 MMOL/L (ref 5–15)
AST SERPL-CCNC: 18 U/L (ref 0–40)
BASOPHILS # BLD AUTO: 0.05 10*3/MM3 (ref 0–0.2)
BASOPHILS NFR BLD AUTO: 0.7 % (ref 0–1.5)
BILIRUB SERPL-MCNC: 1.2 MG/DL (ref 0.2–1.2)
BUN SERPL-MCNC: 13 MG/DL (ref 6–20)
BUN/CREAT SERPL: 16.7 (ref 7.3–30)
CALCIUM SPEC-SCNC: 9.7 MG/DL (ref 8.5–10.2)
CHLORIDE SERPL-SCNC: 102 MMOL/L (ref 98–107)
CO2 SERPL-SCNC: 25.1 MMOL/L (ref 22–29)
CREAT SERPL-MCNC: 0.78 MG/DL (ref 0.7–1.3)
DEPRECATED RDW RBC AUTO: 42.6 FL (ref 37–54)
EGFRCR SERPLBLD CKD-EPI 2021: 93.6 ML/MIN/1.73
EOSINOPHIL # BLD AUTO: 0.33 10*3/MM3 (ref 0–0.4)
EOSINOPHIL NFR BLD AUTO: 4.5 % (ref 0.3–6.2)
ERYTHROCYTE [DISTWIDTH] IN BLOOD BY AUTOMATED COUNT: 12.4 % (ref 12.3–15.4)
GLOBULIN UR ELPH-MCNC: 3.2 GM/DL (ref 1.8–3.5)
GLUCOSE SERPL-MCNC: 142 MG/DL (ref 74–124)
HCT VFR BLD AUTO: 40.1 % (ref 37.5–51)
HGB BLD-MCNC: 13 G/DL (ref 13–17.7)
IMM GRANULOCYTES # BLD AUTO: 0 10*3/MM3 (ref 0–0.05)
IMM GRANULOCYTES NFR BLD AUTO: 0 % (ref 0–0.5)
LYMPHOCYTES # BLD AUTO: 1.12 10*3/MM3 (ref 0.7–3.1)
LYMPHOCYTES NFR BLD AUTO: 15.3 % (ref 19.6–45.3)
MAGNESIUM SERPL-MCNC: 2.1 MG/DL (ref 1.8–2.5)
MCH RBC QN AUTO: 30.3 PG (ref 26.6–33)
MCHC RBC AUTO-ENTMCNC: 32.4 G/DL (ref 31.5–35.7)
MCV RBC AUTO: 93.5 FL (ref 79–97)
MONOCYTES # BLD AUTO: 0.85 10*3/MM3 (ref 0.1–0.9)
MONOCYTES NFR BLD AUTO: 11.6 % (ref 5–12)
NEUTROPHILS NFR BLD AUTO: 4.95 10*3/MM3 (ref 1.7–7)
NEUTROPHILS NFR BLD AUTO: 67.9 % (ref 42.7–76)
NRBC BLD AUTO-RTO: 0 /100 WBC (ref 0–0.2)
PLATELET # BLD AUTO: 244 10*3/MM3 (ref 140–450)
PMV BLD AUTO: 9.2 FL (ref 6–12)
POTASSIUM SERPL-SCNC: 3.9 MMOL/L (ref 3.5–4.7)
PROT SERPL-MCNC: 7.4 G/DL (ref 6.3–8)
RBC # BLD AUTO: 4.29 10*6/MM3 (ref 4.14–5.8)
SODIUM SERPL-SCNC: 139 MMOL/L (ref 134–145)
T4 FREE SERPL-MCNC: 1.66 NG/DL (ref 0.93–1.7)
TSH SERPL DL<=0.05 MIU/L-ACNC: 3.41 UIU/ML (ref 0.27–4.2)
WBC NRBC COR # BLD: 7.3 10*3/MM3 (ref 3.4–10.8)

## 2023-03-13 PROCEDURE — 25010000002 GEMCITABINE 2 GM/52.6ML SOLUTION 52.6 ML VIAL: Performed by: INTERNAL MEDICINE

## 2023-03-13 PROCEDURE — 84443 ASSAY THYROID STIM HORMONE: CPT | Performed by: NURSE PRACTITIONER

## 2023-03-13 PROCEDURE — 25010000002 PALONOSETRON PER 25 MCG: Performed by: INTERNAL MEDICINE

## 2023-03-13 PROCEDURE — 96417 CHEMO IV INFUS EACH ADDL SEQ: CPT

## 2023-03-13 PROCEDURE — 63710000001 OLANZAPINE 5 MG TABLET: Performed by: INTERNAL MEDICINE

## 2023-03-13 PROCEDURE — 85025 COMPLETE CBC W/AUTO DIFF WBC: CPT

## 2023-03-13 PROCEDURE — A9270 NON-COVERED ITEM OR SERVICE: HCPCS | Performed by: INTERNAL MEDICINE

## 2023-03-13 PROCEDURE — 83735 ASSAY OF MAGNESIUM: CPT

## 2023-03-13 PROCEDURE — 25010000002 MAGNESIUM SULFATE PER 500 MG OF MAGNESIUM: Performed by: NURSE PRACTITIONER

## 2023-03-13 PROCEDURE — 96366 THER/PROPH/DIAG IV INF ADDON: CPT

## 2023-03-13 PROCEDURE — 25010000002 DURVALUMAB 50 MG/ML SOLUTION 10 ML VIAL: Performed by: INTERNAL MEDICINE

## 2023-03-13 PROCEDURE — 96375 TX/PRO/DX INJ NEW DRUG ADDON: CPT

## 2023-03-13 PROCEDURE — 96367 TX/PROPH/DG ADDL SEQ IV INF: CPT

## 2023-03-13 PROCEDURE — 84439 ASSAY OF FREE THYROXINE: CPT | Performed by: NURSE PRACTITIONER

## 2023-03-13 PROCEDURE — 25010000002 DEXAMETHASONE SODIUM PHOSPHATE 100 MG/10ML SOLUTION: Performed by: INTERNAL MEDICINE

## 2023-03-13 PROCEDURE — 80053 COMPREHEN METABOLIC PANEL: CPT

## 2023-03-13 PROCEDURE — 96413 CHEMO IV INFUSION 1 HR: CPT

## 2023-03-13 PROCEDURE — 25010000002 FOSAPREPITANT PER 1 MG: Performed by: INTERNAL MEDICINE

## 2023-03-13 PROCEDURE — 25010000002 HEPARIN LOCK FLUSH PER 10 UNITS: Performed by: INTERNAL MEDICINE

## 2023-03-13 PROCEDURE — 99214 OFFICE O/P EST MOD 30 MIN: CPT | Performed by: NURSE PRACTITIONER

## 2023-03-13 PROCEDURE — 25010000002 MAGNESIUM SULFATE PER 500 MG OF MAGNESIUM: Performed by: INTERNAL MEDICINE

## 2023-03-13 PROCEDURE — 63710000001 POTASSIUM CHLORIDE 20 MEQ TABLET CONTROLLED-RELEASE: Performed by: INTERNAL MEDICINE

## 2023-03-13 PROCEDURE — 25010000002 CISPLATIN PER 50 MG: Performed by: INTERNAL MEDICINE

## 2023-03-13 RX ORDER — HEPARIN SODIUM (PORCINE) LOCK FLUSH IV SOLN 100 UNIT/ML 100 UNIT/ML
500 SOLUTION INTRAVENOUS AS NEEDED
Status: CANCELLED | OUTPATIENT
Start: 2023-03-13

## 2023-03-13 RX ORDER — POTASSIUM CHLORIDE 20 MEQ/1
20 TABLET, EXTENDED RELEASE ORAL ONCE
Status: COMPLETED | OUTPATIENT
Start: 2023-03-13 | End: 2023-03-13

## 2023-03-13 RX ORDER — PALONOSETRON 0.05 MG/ML
0.25 INJECTION, SOLUTION INTRAVENOUS ONCE
Status: CANCELLED | OUTPATIENT
Start: 2023-03-20

## 2023-03-13 RX ORDER — SODIUM CHLORIDE 9 MG/ML
250 INJECTION, SOLUTION INTRAVENOUS ONCE
Status: COMPLETED | OUTPATIENT
Start: 2023-03-13 | End: 2023-03-13

## 2023-03-13 RX ORDER — HEPARIN SODIUM (PORCINE) LOCK FLUSH IV SOLN 100 UNIT/ML 100 UNIT/ML
500 SOLUTION INTRAVENOUS AS NEEDED
Status: DISCONTINUED | OUTPATIENT
Start: 2023-03-13 | End: 2023-03-13 | Stop reason: HOSPADM

## 2023-03-13 RX ORDER — POTASSIUM CHLORIDE 20 MEQ/1
20 TABLET, EXTENDED RELEASE ORAL ONCE
Status: CANCELLED
Start: 2023-03-20

## 2023-03-13 RX ORDER — SODIUM CHLORIDE 0.9 % (FLUSH) 0.9 %
10 SYRINGE (ML) INJECTION AS NEEDED
Status: CANCELLED | OUTPATIENT
Start: 2023-03-13

## 2023-03-13 RX ORDER — OLANZAPINE 5 MG/1
5 TABLET ORAL ONCE
Status: CANCELLED | OUTPATIENT
Start: 2023-03-13

## 2023-03-13 RX ORDER — SODIUM CHLORIDE 9 MG/ML
250 INJECTION, SOLUTION INTRAVENOUS ONCE
Status: CANCELLED | OUTPATIENT
Start: 2023-03-20

## 2023-03-13 RX ORDER — OLANZAPINE 5 MG/1
5 TABLET ORAL ONCE
Status: COMPLETED | OUTPATIENT
Start: 2023-03-13 | End: 2023-03-13

## 2023-03-13 RX ORDER — SODIUM CHLORIDE 0.9 % (FLUSH) 0.9 %
10 SYRINGE (ML) INJECTION AS NEEDED
Status: DISCONTINUED | OUTPATIENT
Start: 2023-03-13 | End: 2023-03-13 | Stop reason: HOSPADM

## 2023-03-13 RX ORDER — OLANZAPINE 5 MG/1
5 TABLET ORAL ONCE
Status: CANCELLED | OUTPATIENT
Start: 2023-03-20

## 2023-03-13 RX ORDER — POTASSIUM CHLORIDE 20 MEQ/1
20 TABLET, EXTENDED RELEASE ORAL ONCE
Status: CANCELLED
Start: 2023-03-13

## 2023-03-13 RX ORDER — PALONOSETRON 0.05 MG/ML
0.25 INJECTION, SOLUTION INTRAVENOUS ONCE
Status: COMPLETED | OUTPATIENT
Start: 2023-03-13 | End: 2023-03-13

## 2023-03-13 RX ORDER — PALONOSETRON 0.05 MG/ML
0.25 INJECTION, SOLUTION INTRAVENOUS ONCE
Status: CANCELLED | OUTPATIENT
Start: 2023-03-13

## 2023-03-13 RX ADMIN — OLANZAPINE 5 MG: 5 TABLET, FILM COATED ORAL at 14:32

## 2023-03-13 RX ADMIN — MAGNESIUM SULFATE HEPTAHYDRATE 500 ML/HR: 500 INJECTION, SOLUTION INTRAMUSCULAR; INTRAVENOUS at 09:13

## 2023-03-13 RX ADMIN — SODIUM CHLORIDE 250 ML: 9 INJECTION, SOLUTION INTRAVENOUS at 09:13

## 2023-03-13 RX ADMIN — Medication 500 UNITS: at 14:57

## 2023-03-13 RX ADMIN — POTASSIUM CHLORIDE 20 MEQ: 20 TABLET, EXTENDED RELEASE ORAL at 14:01

## 2023-03-13 RX ADMIN — MAGNESIUM SULFATE HEPTAHYDRATE 500 ML/HR: 500 INJECTION, SOLUTION INTRAMUSCULAR; INTRAVENOUS at 13:51

## 2023-03-13 RX ADMIN — Medication 10 ML: at 14:56

## 2023-03-13 RX ADMIN — PALONOSETRON 0.25 MG: 0.05 INJECTION, SOLUTION INTRAVENOUS at 10:09

## 2023-03-13 RX ADMIN — SODIUM CHLORIDE 1850.19 MG: 900 INJECTION, SOLUTION INTRAVENOUS at 12:12

## 2023-03-13 RX ADMIN — CISPLATIN 46 MG: 1 INJECTION, SOLUTION INTRAVENOUS at 12:46

## 2023-03-13 RX ADMIN — DEXAMETHASONE SODIUM PHOSPHATE 12 MG: 100 INJECTION INTRAMUSCULAR; INTRAVENOUS at 11:52

## 2023-03-13 RX ADMIN — SODIUM CHLORIDE 1500 MG: 900 INJECTION, SOLUTION INTRAVENOUS at 10:47

## 2023-03-13 RX ADMIN — SODIUM CHLORIDE 100 ML: 9 INJECTION, SOLUTION INTRAVENOUS at 10:11

## 2023-03-13 NOTE — NURSING NOTE
Pt here for his first  treatment with Imfinzi / Gemzar/ Cisplat. Pt had education with an NP and a consent was signed.Questions answered. Pt tolerated tx without issues. Lungs clear. No SOA or edma. Voided multiple times during tx. Post weight up 2lbs. Pt discharged home stable.

## 2023-03-14 ENCOUNTER — DOCUMENTATION (OUTPATIENT)
Dept: OTHER | Facility: HOSPITAL | Age: 73
End: 2023-03-14
Payer: MEDICARE

## 2023-03-14 NOTE — PROGRESS NOTES
Oncology Social Work       OSW reach out to patient related to distress score and offer supportive service. OSW spoke to patient via telephone. Patient is followed by Dr. Benedict for his gallbladder cancer. Patient reported that yesterday was his first day of chemotherapy and he was feeling okay today. His main support is his wife and he said that he takes good care of him. OSW explained resources and support that OSW could offer the patient. He declined services at this time. OSW will mail patient OSW contact. No additional follow-up is needed at this time.     Kirsten PÉREZ

## 2023-03-16 NOTE — PROGRESS NOTES
.     REASON FOR FOLLOWUP :   Squamous cell carcinoma of the gallbladder    HISTORY OF PRESENT ILLNESS:  The patient is a 72 y.o. year old male  who is here for follow-up with the above-mentioned history.    Returns today for follow-up and evaluation prior to cycle 1 day 8 Gemzar and cisplatin.  He is seen today with his wife present.  He reports tolerating his first cycle of treatment very well.  He did experience fatigue on Wednesday, however by Thursday was up mowing his yard with a push mower.  He did experience some constipation, controlled with Senokot S2 tablets nightly.  He is eating and drinking adequately.  Denies fever or chills.  Denies nausea or vomiting.  Denies any worsening tinnitus.  Denies signs or symptoms of peripheral neuropathy.    Past Medical History:   Diagnosis Date   • Cancer (HCC)     gallbladder   • Gilbert syndrome    • Hyperlipidemia    • Hypertension      Past Surgical History:   Procedure Laterality Date   • CHOLECYSTECTOMY WITH INTRAOPERATIVE CHOLANGIOGRAM N/A 02/11/2023    Procedure: CHOLECYSTECTOMY LAPAROSCOPIC INTRAOPERATIVE CHOLANGIOGRAM;  Surgeon: Vianey Horn MD;  [facility];  Service: General;  Laterality: N/A;   • COLONOSCOPY     • PILONIDAL CYST DRAINAGE     • VENOUS ACCESS DEVICE (PORT) INSERTION N/A 3/9/2023    Procedure: INSERTION VENOUS ACCESS DEVICE;  Surgeon: Vianey Horn MD;  Location: [facility];  Service: General;  Laterality: N/A;       MEDICATIONS    Current Outpatient Medications:   •  atorvastatin (LIPITOR) 40 MG tablet, Take 1 tablet by mouth Every Night., Disp: , Rfl:   •  lisinopril (PRINIVIL,ZESTRIL) 20 MG tablet, Take 1 tablet by mouth Daily., Disp: , Rfl:   •  OLANZapine (zyPREXA) 5 MG tablet, Take 1 tablet by mouth Every Night. Take on day 2, 3, and 4 and Days 9, 10, 11 after chemotherapy., Disp: 48 tablet, Rfl: 0  •  ondansetron (ZOFRAN) 8 MG tablet, Take 1 tablet by mouth 3 (Three) Times a Day As Needed for Nausea or  "Vomiting., Disp: 30 tablet, Rfl: 5  No current facility-administered medications for this visit.    Facility-Administered Medications Ordered in Other Visits:   •  CISplatin (PLATINOL) 46 mg in sodium chloride 0.9 % 296 mL chemo IVPB, 25 mg/m2 (Treatment Plan Recorded), Intravenous, Once, Vu Benedict II, MD, Last Rate: 320 mL/hr at 03/20/23 1057, 46 mg at 03/20/23 1057  •  magnesium sulfate 0.5 g in sodium chloride 0.9 % 500 mL infusion, 500 mL/hr, Intravenous, Once, Vu Benedict II, MD  •  OLANZapine (zyPREXA) tablet 5 mg, 5 mg, Oral, Once, Vu Benedict II, MD  •  potassium chloride (K-DUR,KLOR-CON) CR tablet 20 mEq, 20 mEq, Oral, Once, Vu Benedict II, MD    ALLERGIES:   No Known Allergies    SOCIAL HISTORY:       Social History     Socioeconomic History   • Marital status:      Spouse name: Oly   Tobacco Use   • Smoking status: Former     Types: Cigarettes   Vaping Use   • Vaping Use: Never used   Substance and Sexual Activity   • Alcohol use: Yes   • Drug use: Never   • Sexual activity: Defer         FAMILY HISTORY:  Family History   Problem Relation Age of Onset   • Malig Hyperthermia Neg Hx      REVIEW OF SYSTEMS:  Review of Systems   Constitutional: Negative for activity change.   HENT: Negative for nosebleeds and trouble swallowing.    Respiratory: Negative for shortness of breath and wheezing.    Cardiovascular: Negative for chest pain and palpitations.   Gastrointestinal: Negative for constipation, diarrhea and nausea.   Genitourinary: Negative for dysuria and hematuria.   Musculoskeletal: Negative for arthralgias and myalgias.   Neurological: Negative for seizures and syncope.   Hematological: Negative for adenopathy. Does not bruise/bleed easily.   Psychiatric/Behavioral: Negative for confusion.          Vitals:    03/20/23 0840   BP: 114/77   Pulse: 91   Resp: 18   Temp: 98.2 °F (36.8 °C)   TempSrc: Temporal   SpO2: 98%   Weight: 70.7 kg (155 lb 12.8 oz)   Height: 173 cm (68.11\") "   PainSc: 0-No pain     Current Status 3/20/2023   ECOG score 0      PHYSICAL EXAM:      CONSTITUTIONAL:  Vital signs reviewed.  No distress, looks comfortable.  EYES:  Conjunctiva and lids unremarkable.  PERRLA  EARS,NOSE,MOUTH,THROAT:  Ears and nose appear unremarkable.  Lips, teeth, gums appear unremarkable.  RESPIRATORY:  Normal respiratory effort.  Lungs clear to auscultation bilaterally.  CARDIOVASCULAR:  Normal S1, S2.  No murmurs rubs or gallops.  No significant lower extremity edema.  GASTROINTESTINAL: Abdomen appears unremarkable.  Nontender.  No hepatomegaly.  No splenomegaly.  LYMPHATIC:  No cervical, supraclavicular, axillary lymphadenopathy.  SKIN:  Warm.  No rashes.  PSYCHIATRIC:  Normal judgment and insight.  Normal mood and affect.       RECENT LABS:        WBC   Date Value Ref Range Status   03/20/2023 4.31 3.40 - 10.80 10*3/mm3 Final   03/13/2023 7.30 3.40 - 10.80 10*3/mm3 Final   03/07/2023 8.40 3.40 - 10.80 10*3/mm3 Final   02/28/2023 8.11 3.40 - 10.80 10*3/mm3 Final   02/12/2023 8.60 3.40 - 10.80 10*3/mm3 Final   02/11/2023 6.21 3.40 - 10.80 10*3/mm3 Final   02/10/2023 5.40 3.40 - 10.80 10*3/mm3 Final   02/09/2023 6.09 3.40 - 10.80 10*3/mm3 Final     Hemoglobin   Date Value Ref Range Status   03/20/2023 12.5 (L) 13.0 - 17.7 g/dL Final   03/13/2023 13.0 13.0 - 17.7 g/dL Final   03/07/2023 13.8 13.0 - 17.7 g/dL Final   02/28/2023 13.5 13.0 - 17.7 g/dL Final   02/12/2023 11.3 (L) 13.0 - 17.7 g/dL Final   02/11/2023 12.6 (L) 13.0 - 17.7 g/dL Final   02/10/2023 13.2 13.0 - 17.7 g/dL Final   02/09/2023 13.5 13.0 - 17.7 g/dL Final     Platelets   Date Value Ref Range Status   03/20/2023 152 140 - 450 10*3/mm3 Final   03/13/2023 244 140 - 450 10*3/mm3 Final   03/07/2023   Corrected     Comment:     Plt clumps. Results removed for inaccuracy per  Code  Corrected result. Previous result was 85 10*3/mm3 on 3/7/2023 at 1148 EST.   02/28/2023 159 140 - 450 10*3/mm3 Final   02/12/2023 150 140 - 450  10*3/mm3 Final   02/11/2023 195 140 - 450 10*3/mm3 Final   02/10/2023 215 140 - 450 10*3/mm3 Final   02/09/2023 260 140 - 450 10*3/mm3 Final       Assessment & Plan   There are no diagnoses linked to this encounter.      Guillermo Salgado   *Squamous cell carcinoma of the gallbladder  · Incidentally found on path review from cholecystectomy.  · Due to symptomatic cholelithiasis, laparoscopic cholecystectomy 2/11/2023, Dr. 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  5 cm.  Invades perimuscular connective tissue.  Negative for lymph-vascular invasion.  Positive for focal perineural invasion.  Cystic duct margin negative.  Grade 1.  Margins were felt to be negative per pathologist.  Pathologist stage this as a [id number] cancer.  · Dr. Horn stated the gallbladder was significantly inflamed and came out in fragments so she is not sure that staging is entirely clinically accurate.  She noted she also sent the patient to Dr. Alex Delgado of hepatobiliary surgery to determine if patient needs formal lymphadenectomy/liver resection.  · Per NCCN guidelines: Needs multiphase abdomen/pelvis CT or MRI with IV contrast and CT chest with or without IV contrast.  Consider staging laparoscopy.  If felt to be resectable, then hepatic resection with lymphadenectomy with or without bile duct excision for malignant involvement.  If felt to be unresectable, then MSI/MMR testing, TMB testing, additional molecular testing to include an TRK.  Options include systemic therapy (preferred), clinical trial (preferred), palliative XRT, or best supportive care.  · Per up-to-date guidelines, for a T2 cancer found incidentally after gallbladder surgery: Reexploration and extended cholecystectomy are also indicated.  Re-exploration identifies residual tumor in 40 to 75% of cases, and a high likelihood of liver involvement and jacki metastasis with T2 disease.  Re-resection significantly increases the likelihood of  long-term DFS in patients with T2 disease.  In many series, 5-year OS increased from 25-40%, up to % with aggressive surgery.  Up-to-date authors recommend re-resection to resect at least a 2 cm margin of the liver bed and perform portal/hepatoduodenal ligament lymphadenectomy.  · Up-to-date authors recommend adjuvant therapy for all patients with completely resected T1b or higher or node positive or margin positive gallbladder cancer.  Up-to-date authors note [facility] suggests adjuvant therapy for all patients with resected gallbladder cancer.  Up-to-date authors note there is no consensus on the optimal adjuvant therapy but they recommend 6 months of postoperative chemotherapy alone with Xeloda monotherapy for most patients.  They note if Xeloda is chosen they start treatment with no more than 1500 mg total dose twice daily.  They note an alternative approaches combining concomitant 5-FU based chemoradiotherapy with 4 months of systemic chemotherapy especially with patients with margin positive disease.  They note in this situation chemotherapy can be with Xeloda monotherapy or Xeloda plus Gemzar.  They note for patients who received chemo XRT plus adjuvant chemo, there is no consensus on sequencing.  In general they state it is preferable to start with chemotherapy first, complete 3 to 4 months of systemic exposure because this will avoid XRT for patients who are destined to develop early distant disease.  · Dr. Alex Delgado, surgical oncology, states although LAD not read on MRI 2/10/2023, he sees bulky LAD on his imaging review.  He states this is currently not resectable due to the bulky LAD and requests chemotherapy in hopes this will become resectable.  He requests avoiding XRT.  · Per the Topaz 1 trial, plan Gemzar 1000 mg/m2 D1, D8 and cisplatin 25 mg/m2 D1, D8 and durvalumab 1500 mg flat dose.  Cycles every 3 weeks.  Up to 8 cycles.  This can be followed by durvalumab 1500 mg flat dose every 4 weeks.   Durvalumab was associated with a longer PFS and a higher objective response rate (26.7% versus 18.7% without durvalumab).  The Topaz 1 trial included previously untreated unresectable locally advanced or metastatic intrahepatic or extrahepatic cholangiocarcinoma or gallbladder cancer.  Also, cisplatin and Gemzar is a common regimen in squamous cell carcinoma of a more common primary (lung).  Therefore, I feel cisplatin Gemzar durvalumab is the best regimen for this currently unresectable squamous cell carcinoma of the gallbladder.  · Dr. Delgado is recommended a prechemotherapy PET scan and then to check a PET scan again after roughly 2 months of chemotherapy to see if there has been enough improvement to see if this is resectable.  · PET 3/2/2023: Intensely active tanner hepatis nodes, and ill-defined hypodensity in the liver parenchyma adjacent to gallbladder fossa, corresponding to areas of apparent masslike invasion seen on recent MRI, felt to be concerning for malignant invasion of the liver.  A few subcentimeter hypodense lesions in the liver too small to characterize.  Could not exclude peritoneal invasion/carcinomatosis.  Suggestion of a patent active thyroid nodule.  Radiologist recommended correlation with thyroid ultrasound.  · Pretreatment CA 19-9 and CEA normal  · 3/13/2023: Begin Gemzar 1000 mg/m2 D1, D8 and cisplatin 25 mg/m2 D1, D8 and durvalumab 1500 mg flat dose.  Cycles every 3 weeks.  Up to 8 cycles.  This can be followed by durvalumab 1500 mg flat dose every 4 weeks   · (if this cannot be resected, then the Topaz 1 trial continue durvalumab until progression or toxicity.  If this can be resected, likely would not continue single agent durvalumab).  · 3/13/2023 cycle 1 day 1 Gemzar, cisplatin, durvalumab.   · 3/20/2023: Cycle 1 day 8 Gemzar and cisplatin.  Elevated LFTs with AST 86, .  Patient resumed his atorvastatin after discharge from the hospital and is not taking tylenol.  Discussed  with Dr. Benedict, we will proceed with Cisplatin and hold Gemzar.  He will return twice weekly for STAT CMP with RN review to monitor liver functions closely.  If worsening LFTs after holding Gemzar, may need to consider steroids.    *Suggestion of PET active thyroid nodule on PET 3/2/2023.  · PET 3/2/2023: Radiologist recommended thyroid ultrasound.    · 3/7/2023: Ordered thyroid ultrasound (but patient understands even if something looks concerning, addressed the gallbladder cancer first and only work on the thyroid nodule if gallbladder cancer treatment has completed and is felt to be in remission  · 3/8/2023: Thyroid ultrasound with right thyroid lobe measuring 5.2 x 2.1 x 1.8 cm in the left thyroid lobe measuring 3.5 x 1.6 x 1.4 cm.  There are multiple thyroid nodules including a 1.5 x 1.1 x 1.1 cm solid-appearing well marginated wider than tall nodule.  TI-RADS-3.  Consider 1 year follow-up if clinically indicated.    · Repeat thyroid ultrasound 1 year from last, approximately 3/2024.    *Not needed now but, regarding surveillance after completing adjuvant therapy:  · Up-to-date authors state there is no agreed-upon surveillance but after completing adjuvant therapy they follow CEA and CA 19 9 every 3 to 4 months for the first 2 years after surgery, then every 6 months for 1 more year with imaging only as clinically indicated.  · NCCN guidelines state consider imaging every 3 to 6 months x 2 years, then every 6 to 12 months for up to 5 years or as clinically indicated and consider CEA and CA 19-9 as clinically indicated.    Plan:   · Cycle 1 day 8 chemotherapy today.  Cisplatin only.  Gemzar being held due to elevated LFTs.  · Discontinue atorvastatin until completion of chemotherapy.  · Return Mondays and Thursdays for stat CMP to monitor LFTs.  If no improvement after holding atorvastatin and Gemzar, may need to consider steroids.  · Gemzar D1, D8 and cisplatin D1, D8 and durvalumab 1500 mg flat dose.    · Cycles every 3 weeks.  Up to 8 cycles.    · (Plan PET after roughly 2 months (after 3 cycles) to see if there is enough improvement to see if this is resectable.  · This can be followed by durvalumab 1500 mg flat dose every 4 weeks (single agent durvalumab continues until progression or toxicity if this is not resectable after chemotherapy).    · Arranged appointments through first 3 cycles including PET scan (around 5/8/2023) after cycle 3 and appointment with Dr. Benedict and Dr. Delgado after PET scan, that same week.  Would be due for cycle 4 on 5/15/2023, so would like decisions made regarding surgery prior to that  · Port placement by Dr. Horn.  · Anticipate repeat thyroid ultrasound 1 year from last, approximately 3/2024.     The patient is on high risk medication that requires close monitoring for toxicity.     Georgia Julian, TY  03/20/23

## 2023-03-17 ENCOUNTER — TELEPHONE (OUTPATIENT)
Dept: ONCOLOGY | Facility: CLINIC | Age: 73
End: 2023-03-17
Payer: MEDICARE

## 2023-03-20 ENCOUNTER — INFUSION (OUTPATIENT)
Dept: ONCOLOGY | Facility: HOSPITAL | Age: 73
End: 2023-03-20
Payer: MEDICARE

## 2023-03-20 ENCOUNTER — OFFICE VISIT (OUTPATIENT)
Dept: ONCOLOGY | Facility: CLINIC | Age: 73
End: 2023-03-20
Payer: MEDICARE

## 2023-03-20 ENCOUNTER — DOCUMENTATION (OUTPATIENT)
Dept: ONCOLOGY | Facility: CLINIC | Age: 73
End: 2023-03-20
Payer: MEDICARE

## 2023-03-20 VITALS
HEART RATE: 91 BPM | BODY MASS INDEX: 23.61 KG/M2 | OXYGEN SATURATION: 98 % | RESPIRATION RATE: 18 BRPM | TEMPERATURE: 98.2 F | HEIGHT: 68 IN | SYSTOLIC BLOOD PRESSURE: 114 MMHG | DIASTOLIC BLOOD PRESSURE: 77 MMHG | WEIGHT: 155.8 LBS

## 2023-03-20 VITALS — BODY MASS INDEX: 23.79 KG/M2 | WEIGHT: 157 LBS

## 2023-03-20 DIAGNOSIS — C23 GALLBLADDER CANCER: ICD-10-CM

## 2023-03-20 DIAGNOSIS — C23 GALLBLADDER CANCER: Primary | ICD-10-CM

## 2023-03-20 DIAGNOSIS — Z79.899 HIGH RISK MEDICATION USE: ICD-10-CM

## 2023-03-20 DIAGNOSIS — Z45.9 ENCOUNTER FOR MANAGEMENT OF IMPLANTED DEVICE: ICD-10-CM

## 2023-03-20 DIAGNOSIS — R79.89 ELEVATED LFTS: ICD-10-CM

## 2023-03-20 LAB
ALBUMIN SERPL-MCNC: 3.9 G/DL (ref 3.5–5.2)
ALBUMIN/GLOB SERPL: 1.1 G/DL (ref 1.1–2.4)
ALP SERPL-CCNC: 233 U/L (ref 38–116)
ALT SERPL W P-5'-P-CCNC: 191 U/L (ref 0–41)
ANION GAP SERPL CALCULATED.3IONS-SCNC: 11.2 MMOL/L (ref 5–15)
AST SERPL-CCNC: 86 U/L (ref 0–40)
BASOPHILS # BLD AUTO: 0.04 10*3/MM3 (ref 0–0.2)
BASOPHILS NFR BLD AUTO: 0.9 % (ref 0–1.5)
BILIRUB SERPL-MCNC: 1.1 MG/DL (ref 0.2–1.2)
BUN SERPL-MCNC: 18 MG/DL (ref 6–20)
BUN/CREAT SERPL: 23.1 (ref 7.3–30)
CALCIUM SPEC-SCNC: 9.5 MG/DL (ref 8.5–10.2)
CHLORIDE SERPL-SCNC: 99 MMOL/L (ref 98–107)
CO2 SERPL-SCNC: 24.8 MMOL/L (ref 22–29)
CREAT SERPL-MCNC: 0.78 MG/DL (ref 0.7–1.3)
DEPRECATED RDW RBC AUTO: 41 FL (ref 37–54)
EGFRCR SERPLBLD CKD-EPI 2021: 93.6 ML/MIN/1.73
EOSINOPHIL # BLD AUTO: 0.03 10*3/MM3 (ref 0–0.4)
EOSINOPHIL NFR BLD AUTO: 0.7 % (ref 0.3–6.2)
ERYTHROCYTE [DISTWIDTH] IN BLOOD BY AUTOMATED COUNT: 12.1 % (ref 12.3–15.4)
GLOBULIN UR ELPH-MCNC: 3.6 GM/DL (ref 1.8–3.5)
GLUCOSE SERPL-MCNC: 142 MG/DL (ref 74–124)
HCT VFR BLD AUTO: 36.8 % (ref 37.5–51)
HGB BLD-MCNC: 12.5 G/DL (ref 13–17.7)
IMM GRANULOCYTES # BLD AUTO: 0.02 10*3/MM3 (ref 0–0.05)
IMM GRANULOCYTES NFR BLD AUTO: 0.5 % (ref 0–0.5)
LYMPHOCYTES # BLD AUTO: 1.07 10*3/MM3 (ref 0.7–3.1)
LYMPHOCYTES NFR BLD AUTO: 24.8 % (ref 19.6–45.3)
MAGNESIUM SERPL-MCNC: 2 MG/DL (ref 1.8–2.5)
MCH RBC QN AUTO: 31 PG (ref 26.6–33)
MCHC RBC AUTO-ENTMCNC: 34 G/DL (ref 31.5–35.7)
MCV RBC AUTO: 91.3 FL (ref 79–97)
MONOCYTES # BLD AUTO: 0.51 10*3/MM3 (ref 0.1–0.9)
MONOCYTES NFR BLD AUTO: 11.8 % (ref 5–12)
NEUTROPHILS NFR BLD AUTO: 2.64 10*3/MM3 (ref 1.7–7)
NEUTROPHILS NFR BLD AUTO: 61.3 % (ref 42.7–76)
NRBC BLD AUTO-RTO: 0 /100 WBC (ref 0–0.2)
PLATELET # BLD AUTO: 152 10*3/MM3 (ref 140–450)
PMV BLD AUTO: 8.9 FL (ref 6–12)
POTASSIUM SERPL-SCNC: 4.2 MMOL/L (ref 3.5–4.7)
PROT SERPL-MCNC: 7.5 G/DL (ref 6.3–8)
RBC # BLD AUTO: 4.03 10*6/MM3 (ref 4.14–5.8)
SODIUM SERPL-SCNC: 135 MMOL/L (ref 134–145)
WBC NRBC COR # BLD: 4.31 10*3/MM3 (ref 3.4–10.8)

## 2023-03-20 PROCEDURE — 3074F SYST BP LT 130 MM HG: CPT | Performed by: NURSE PRACTITIONER

## 2023-03-20 PROCEDURE — 25010000002 CISPLATIN PER 50 MG: Performed by: INTERNAL MEDICINE

## 2023-03-20 PROCEDURE — A9270 NON-COVERED ITEM OR SERVICE: HCPCS | Performed by: INTERNAL MEDICINE

## 2023-03-20 PROCEDURE — 1160F RVW MEDS BY RX/DR IN RCRD: CPT | Performed by: NURSE PRACTITIONER

## 2023-03-20 PROCEDURE — 96375 TX/PRO/DX INJ NEW DRUG ADDON: CPT

## 2023-03-20 PROCEDURE — 80053 COMPREHEN METABOLIC PANEL: CPT

## 2023-03-20 PROCEDURE — 99214 OFFICE O/P EST MOD 30 MIN: CPT | Performed by: NURSE PRACTITIONER

## 2023-03-20 PROCEDURE — 63710000001 POTASSIUM CHLORIDE 20 MEQ TABLET CONTROLLED-RELEASE: Performed by: INTERNAL MEDICINE

## 2023-03-20 PROCEDURE — 25010000002 HEPARIN LOCK FLUSH PER 10 UNITS: Performed by: INTERNAL MEDICINE

## 2023-03-20 PROCEDURE — 96413 CHEMO IV INFUSION 1 HR: CPT

## 2023-03-20 PROCEDURE — 1126F AMNT PAIN NOTED NONE PRSNT: CPT | Performed by: NURSE PRACTITIONER

## 2023-03-20 PROCEDURE — 96366 THER/PROPH/DIAG IV INF ADDON: CPT

## 2023-03-20 PROCEDURE — 3078F DIAST BP <80 MM HG: CPT | Performed by: NURSE PRACTITIONER

## 2023-03-20 PROCEDURE — 25010000002 MAGNESIUM SULFATE PER 500 MG OF MAGNESIUM: Performed by: INTERNAL MEDICINE

## 2023-03-20 PROCEDURE — 63710000001 OLANZAPINE 5 MG TABLET: Performed by: INTERNAL MEDICINE

## 2023-03-20 PROCEDURE — 96367 TX/PROPH/DG ADDL SEQ IV INF: CPT

## 2023-03-20 PROCEDURE — 25010000002 FOSAPREPITANT PER 1 MG: Performed by: INTERNAL MEDICINE

## 2023-03-20 PROCEDURE — 83735 ASSAY OF MAGNESIUM: CPT

## 2023-03-20 PROCEDURE — 85025 COMPLETE CBC W/AUTO DIFF WBC: CPT

## 2023-03-20 PROCEDURE — 25010000002 PALONOSETRON PER 25 MCG: Performed by: INTERNAL MEDICINE

## 2023-03-20 PROCEDURE — 25010000002 DEXAMETHASONE SODIUM PHOSPHATE 100 MG/10ML SOLUTION: Performed by: INTERNAL MEDICINE

## 2023-03-20 PROCEDURE — 1159F MED LIST DOCD IN RCRD: CPT | Performed by: NURSE PRACTITIONER

## 2023-03-20 RX ORDER — POTASSIUM CHLORIDE 20 MEQ/1
20 TABLET, EXTENDED RELEASE ORAL ONCE
Status: COMPLETED | OUTPATIENT
Start: 2023-03-20 | End: 2023-03-20

## 2023-03-20 RX ORDER — SODIUM CHLORIDE 0.9 % (FLUSH) 0.9 %
10 SYRINGE (ML) INJECTION AS NEEDED
Status: CANCELLED | OUTPATIENT
Start: 2023-03-20

## 2023-03-20 RX ORDER — SODIUM CHLORIDE 0.9 % (FLUSH) 0.9 %
10 SYRINGE (ML) INJECTION AS NEEDED
Status: DISCONTINUED | OUTPATIENT
Start: 2023-03-20 | End: 2023-03-20 | Stop reason: HOSPADM

## 2023-03-20 RX ORDER — HEPARIN SODIUM (PORCINE) LOCK FLUSH IV SOLN 100 UNIT/ML 100 UNIT/ML
500 SOLUTION INTRAVENOUS AS NEEDED
Status: CANCELLED | OUTPATIENT
Start: 2023-03-20

## 2023-03-20 RX ORDER — SODIUM CHLORIDE 9 MG/ML
250 INJECTION, SOLUTION INTRAVENOUS ONCE
Status: COMPLETED | OUTPATIENT
Start: 2023-03-20 | End: 2023-03-20

## 2023-03-20 RX ORDER — OLANZAPINE 5 MG/1
5 TABLET ORAL ONCE
Status: COMPLETED | OUTPATIENT
Start: 2023-03-20 | End: 2023-03-20

## 2023-03-20 RX ORDER — PALONOSETRON 0.05 MG/ML
0.25 INJECTION, SOLUTION INTRAVENOUS ONCE
Status: COMPLETED | OUTPATIENT
Start: 2023-03-20 | End: 2023-03-20

## 2023-03-20 RX ORDER — HEPARIN SODIUM (PORCINE) LOCK FLUSH IV SOLN 100 UNIT/ML 100 UNIT/ML
500 SOLUTION INTRAVENOUS AS NEEDED
Status: DISCONTINUED | OUTPATIENT
Start: 2023-03-20 | End: 2023-03-20 | Stop reason: HOSPADM

## 2023-03-20 RX ADMIN — MAGNESIUM SULFATE HEPTAHYDRATE 500 ML/HR: 500 INJECTION, SOLUTION INTRAMUSCULAR; INTRAVENOUS at 12:05

## 2023-03-20 RX ADMIN — POTASSIUM CHLORIDE 20 MEQ: 20 TABLET, EXTENDED RELEASE ORAL at 12:05

## 2023-03-20 RX ADMIN — DEXAMETHASONE SODIUM PHOSPHATE 12 MG: 10 INJECTION, SOLUTION INTRAMUSCULAR; INTRAVENOUS at 10:06

## 2023-03-20 RX ADMIN — CISPLATIN 46 MG: 1 INJECTION, SOLUTION INTRAVENOUS at 10:57

## 2023-03-20 RX ADMIN — SODIUM CHLORIDE 100 ML: 9 INJECTION, SOLUTION INTRAVENOUS at 10:21

## 2023-03-20 RX ADMIN — Medication 500 UNITS: at 13:18

## 2023-03-20 RX ADMIN — OLANZAPINE 5 MG: 5 TABLET, FILM COATED ORAL at 12:05

## 2023-03-20 RX ADMIN — PALONOSETRON 0.25 MG: 0.05 INJECTION, SOLUTION INTRAVENOUS at 10:04

## 2023-03-20 RX ADMIN — SODIUM CHLORIDE 250 ML: 9 INJECTION, SOLUTION INTRAVENOUS at 09:11

## 2023-03-20 RX ADMIN — Medication 10 ML: at 13:18

## 2023-03-20 RX ADMIN — MAGNESIUM SULFATE HEPTAHYDRATE 500 ML/HR: 500 INJECTION, SOLUTION INTRAMUSCULAR; INTRAVENOUS at 09:11

## 2023-03-20 NOTE — PROGRESS NOTES
Georgia Julian NP in our office, seeing the patient today.  She talked to me about him because his ALT is 4.66 xULN.    Note prior to chemotherapy transaminases were significantly elevated during his hospital stay.  His statin was held during the hospital stay and his gallbladder was removed.  Transaminases came down to normal by around the time of discharge.  Upon discharge, the statin was restarted.    Patient due for C1 D8 cisplatin Gemzar (not due for Imfinzi today).    By guidelines, could proceed with full dose cisplatin Gemzar because ALT is <5 times ULN.  However, if ALT was 5 times ULN or higher, recommendations are to hold or decrease Gemzar by 50% (grade 3 hepatotoxicity).    He is so close to this cutoff, we will hold Gemzar today but proceed with cisplatin.  My concern is if we give Gemzar today, even at a reduced dose, it would be difficult to know if this transaminase elevation is due to Imfinzi or Gemzar.    He should stay off the statin for the remainder of cancer therapy.    Hold Gemzar today.    If LFTs continue to significantly worsen, he may need steroids because this could be immunotherapy related hepatotoxicity (from Imfinzi).    If LFTs significantly improve by the time he is due for his next treatment, we will try to resume Gemzar, likely at a dose reduction (perhaps 25% or could be even 50%, depending on what LFTs are.  I would say if LFTs are normal by the time of the next Gemzar dosing, likely due to Gemzar to 25% dose reduction).

## 2023-03-20 NOTE — NURSING NOTE
Gemzar held due to elevated LFT's.  Pt voided multiple times throughout tx. Lungs clear. Gained 2 lbs post tx. No SOA or edema noted. Discharged home stable.

## 2023-03-23 ENCOUNTER — DOCUMENTATION (OUTPATIENT)
Dept: ONCOLOGY | Facility: CLINIC | Age: 73
End: 2023-03-23
Payer: MEDICARE

## 2023-03-23 ENCOUNTER — CLINICAL SUPPORT (OUTPATIENT)
Dept: ONCOLOGY | Facility: HOSPITAL | Age: 73
End: 2023-03-23
Payer: MEDICARE

## 2023-03-23 ENCOUNTER — LAB (OUTPATIENT)
Dept: LAB | Facility: HOSPITAL | Age: 73
End: 2023-03-23
Payer: MEDICARE

## 2023-03-23 DIAGNOSIS — C23 GALLBLADDER CANCER: ICD-10-CM

## 2023-03-23 DIAGNOSIS — Z45.9 ENCOUNTER FOR MANAGEMENT OF IMPLANTED DEVICE: Primary | ICD-10-CM

## 2023-03-23 DIAGNOSIS — Z45.9 ENCOUNTER FOR MANAGEMENT OF IMPLANTED DEVICE: ICD-10-CM

## 2023-03-23 LAB
ALBUMIN SERPL-MCNC: 4.1 G/DL (ref 3.5–5.2)
ALBUMIN/GLOB SERPL: 1.1 G/DL (ref 1.1–2.4)
ALP SERPL-CCNC: 183 U/L (ref 38–116)
ALT SERPL W P-5'-P-CCNC: 126 U/L (ref 0–41)
ANION GAP SERPL CALCULATED.3IONS-SCNC: 11.8 MMOL/L (ref 5–15)
AST SERPL-CCNC: 54 U/L (ref 0–40)
BILIRUB SERPL-MCNC: 0.9 MG/DL (ref 0.2–1.2)
BUN SERPL-MCNC: 14 MG/DL (ref 6–20)
BUN/CREAT SERPL: 16.7 (ref 7.3–30)
CALCIUM SPEC-SCNC: 9.9 MG/DL (ref 8.5–10.2)
CHLORIDE SERPL-SCNC: 98 MMOL/L (ref 98–107)
CO2 SERPL-SCNC: 27.2 MMOL/L (ref 22–29)
CREAT SERPL-MCNC: 0.84 MG/DL (ref 0.7–1.3)
EGFRCR SERPLBLD CKD-EPI 2021: 91.5 ML/MIN/1.73
GLOBULIN UR ELPH-MCNC: 3.7 GM/DL (ref 1.8–3.5)
GLUCOSE SERPL-MCNC: 110 MG/DL (ref 74–124)
POTASSIUM SERPL-SCNC: 4.2 MMOL/L (ref 3.5–4.7)
PROT SERPL-MCNC: 7.8 G/DL (ref 6.3–8)
SODIUM SERPL-SCNC: 137 MMOL/L (ref 134–145)

## 2023-03-23 PROCEDURE — 36415 COLL VENOUS BLD VENIPUNCTURE: CPT

## 2023-03-23 PROCEDURE — 80053 COMPREHEN METABOLIC PANEL: CPT

## 2023-03-23 NOTE — NURSING NOTE
Pt here today repeat CMP to evaluate LFT's.  Spoke with Georgia CAMP, she did not fee like pt needed to stay here for these to result.    Discussed with pt that we are waiting on labs and will call him once they are resulted, he v/u.  He is without complaints, states he has stopped his cholesterol medication as advised.       CMP resulted, labs are improving but remain slightly elevated.  I reviewed these with Dr Benedict. No additional orders, will continue to monitor labs.  Pt has return appt on Monday 3/27    Called and updated pt, he v/u of lab results and understands to continue to hold cholesterol medication

## 2023-03-23 NOTE — PROGRESS NOTES
LFTs are better (after holding Gemzar on 3/20/2023).  This suggests Gemzar was the cause of the increased LFTs.  No change of plans today.  Continue same plan

## 2023-03-27 ENCOUNTER — LAB (OUTPATIENT)
Dept: LAB | Facility: HOSPITAL | Age: 73
End: 2023-03-27
Payer: MEDICARE

## 2023-03-27 ENCOUNTER — CLINICAL SUPPORT (OUTPATIENT)
Dept: ONCOLOGY | Facility: HOSPITAL | Age: 73
End: 2023-03-27
Payer: MEDICARE

## 2023-03-27 ENCOUNTER — DOCUMENTATION (OUTPATIENT)
Dept: ONCOLOGY | Facility: CLINIC | Age: 73
End: 2023-03-27
Payer: MEDICARE

## 2023-03-27 DIAGNOSIS — C23 GALLBLADDER CANCER: ICD-10-CM

## 2023-03-27 DIAGNOSIS — R79.89 ABNORMAL LFTS: Primary | ICD-10-CM

## 2023-03-27 DIAGNOSIS — R79.89 ABNORMAL LFTS: ICD-10-CM

## 2023-03-27 LAB
ALBUMIN SERPL-MCNC: 4.3 G/DL (ref 3.5–5.2)
ALBUMIN/GLOB SERPL: 1.2 G/DL (ref 1.1–2.4)
ALP SERPL-CCNC: 148 U/L (ref 38–116)
ALT SERPL W P-5'-P-CCNC: 57 U/L (ref 0–41)
ANION GAP SERPL CALCULATED.3IONS-SCNC: 11.2 MMOL/L (ref 5–15)
AST SERPL-CCNC: 25 U/L (ref 0–40)
BILIRUB SERPL-MCNC: 0.6 MG/DL (ref 0.2–1.2)
BUN SERPL-MCNC: 13 MG/DL (ref 6–20)
BUN/CREAT SERPL: 15.5 (ref 7.3–30)
CALCIUM SPEC-SCNC: 10 MG/DL (ref 8.5–10.2)
CHLORIDE SERPL-SCNC: 100 MMOL/L (ref 98–107)
CO2 SERPL-SCNC: 27.8 MMOL/L (ref 22–29)
CREAT SERPL-MCNC: 0.84 MG/DL (ref 0.7–1.3)
EGFRCR SERPLBLD CKD-EPI 2021: 91.5 ML/MIN/1.73
GLOBULIN UR ELPH-MCNC: 3.7 GM/DL (ref 1.8–3.5)
GLUCOSE SERPL-MCNC: 109 MG/DL (ref 74–124)
POTASSIUM SERPL-SCNC: 5.3 MMOL/L (ref 3.5–4.7)
PROT SERPL-MCNC: 8 G/DL (ref 6.3–8)
SODIUM SERPL-SCNC: 139 MMOL/L (ref 134–145)

## 2023-03-27 PROCEDURE — 80053 COMPREHEN METABOLIC PANEL: CPT

## 2023-03-27 NOTE — PROGRESS NOTES
3/27/2023: Noted LFTs much better, with transaminases almost normal.    When he is due for treatment on Monday, as long as LFTs do not look any worse than today's values, give Gemzar at a 25% dose reduction.      If LFTs surprisingly look significantly worse than this, but remained much better than before, we could consider Gemzar to 50% dose reduction.    Of course, if LFTs are in the range that they were previously when we held Gemzar, we would hold Gemzar again.      Labs look so good today, he can skip his lab on Thursday

## 2023-03-31 NOTE — PROGRESS NOTES
"    .     REASON FOR FOLLOWUP :   Squamous cell carcinoma of the gallbladder    HISTORY OF PRESENT ILLNESS:  The patient is a 72 y.o. year old male  who is here for follow-up with the above-mentioned history.    He returns today for follow-up and evaluation prior to cycle 2-day 1 Gemzar, cisplatin, and Imfinzi.  Cycle 1 day 8 patient received cisplatin only, Gemzar held due to elevated LFTs.  He return for biweekly lab checks to monitor LFTs which fortunately have been trending downward.  He is seen today with his wife present.  He is eating and drinking adequately, weight remains stable.  He did feel a little \"unsettled \"in his stomach a few times, not requiring Zofran, no emesis.  He continues Senokot S2 tablets nightly to help his bowels move regularly.  His tinnitus remains stable.  He denies fever or chills.  He denies vomiting.  He denies hearing loss.  He denies signs or symptoms of peripheral neuropathy.    Past Medical History:   Diagnosis Date   • Cancer     gallbladder   • Gilbert syndrome    • Hyperlipidemia    • Hypertension      Past Surgical History:   Procedure Laterality Date   • CHOLECYSTECTOMY WITH INTRAOPERATIVE CHOLANGIOGRAM N/A 02/11/2023    Procedure: CHOLECYSTECTOMY LAPAROSCOPIC INTRAOPERATIVE CHOLANGIOGRAM;  Surgeon: Vianey Horn MD;  Location: [facility] OR;  Service: General;  Laterality: N/A;   • COLONOSCOPY     • PILONIDAL CYST DRAINAGE     • VENOUS ACCESS DEVICE (PORT) INSERTION N/A 3/9/2023    Procedure: INSERTION VENOUS ACCESS DEVICE;  Surgeon: Vianey Horn MD;  Location: [facility];  Service: General;  Laterality: N/A;       MEDICATIONS    Current Outpatient Medications:   •  lisinopril (PRINIVIL,ZESTRIL) 20 MG tablet, Take 1 tablet by mouth Daily., Disp: , Rfl:   •  OLANZapine (zyPREXA) 5 MG tablet, Take 1 tablet by mouth Every Night. Take on day 2, 3, and 4 and Days 9, 10, 11 after chemotherapy., Disp: 48 tablet, Rfl: 0  •  ondansetron (ZOFRAN) 8 MG tablet, Take 1 " "tablet by mouth 3 (Three) Times a Day As Needed for Nausea or Vomiting., Disp: 30 tablet, Rfl: 5  •  atorvastatin (LIPITOR) 40 MG tablet, Take 1 tablet by mouth Every Night., Disp: , Rfl:     ALLERGIES:   No Known Allergies    SOCIAL HISTORY:       Social History     Socioeconomic History   • Marital status:      Spouse name: Oly   Tobacco Use   • Smoking status: Former     Types: Cigarettes   Vaping Use   • Vaping Use: Never used   Substance and Sexual Activity   • Alcohol use: Yes   • Drug use: Never   • Sexual activity: Defer         FAMILY HISTORY:  Family History   Problem Relation Age of Onset   • Malig Hyperthermia Neg Hx      REVIEW OF SYSTEMS:  Review of Systems   Constitutional: Negative for activity change.   HENT: Negative for nosebleeds and trouble swallowing.    Respiratory: Negative for shortness of breath and wheezing.    Cardiovascular: Negative for chest pain and palpitations.   Gastrointestinal: Negative for constipation, diarrhea and nausea.   Genitourinary: Negative for dysuria and hematuria.   Musculoskeletal: Negative for arthralgias and myalgias.   Neurological: Negative for seizures and syncope.   Hematological: Negative for adenopathy. Does not bruise/bleed easily.   Psychiatric/Behavioral: Negative for confusion.          Vitals:    04/03/23 0828   BP: 126/81   Pulse: 89   Resp: 18   Temp: 97.8 °F (36.6 °C)   TempSrc: Temporal   SpO2: 97%   Weight: 71.2 kg (156 lb 14.4 oz)   Height: 173 cm (68.11\")   PainSc: 0-No pain     Current Status 4/3/2023   ECOG score 0      PHYSICAL EXAM:      CONSTITUTIONAL:  Vital signs reviewed.  No distress, looks comfortable.  EYES:  Conjunctiva and lids unremarkable.  PERRLA  EARS,NOSE,MOUTH,THROAT:  Ears and nose appear unremarkable.  Lips, teeth, gums appear unremarkable.  RESPIRATORY:  Normal respiratory effort.  Lungs clear to auscultation bilaterally.  CARDIOVASCULAR:  Normal S1, S2.  No murmurs rubs or gallops.  No significant lower " extremity edema.  GASTROINTESTINAL: Abdomen appears unremarkable.  Nontender.  No hepatomegaly.  No splenomegaly.  LYMPHATIC:  No cervical, supraclavicular, axillary lymphadenopathy.  SKIN:  Warm.  No rashes.  PSYCHIATRIC:  Normal judgment and insight.  Normal mood and affect.       RECENT LABS:        WBC   Date Value Ref Range Status   04/03/2023 6.55 3.40 - 10.80 10*3/mm3 Final   03/20/2023 4.31 3.40 - 10.80 10*3/mm3 Final   03/13/2023 7.30 3.40 - 10.80 10*3/mm3 Final   03/07/2023 8.40 3.40 - 10.80 10*3/mm3 Final   02/28/2023 8.11 3.40 - 10.80 10*3/mm3 Final   02/12/2023 8.60 3.40 - 10.80 10*3/mm3 Final   02/11/2023 6.21 3.40 - 10.80 10*3/mm3 Final   02/10/2023 5.40 3.40 - 10.80 10*3/mm3 Final   02/09/2023 6.09 3.40 - 10.80 10*3/mm3 Final     Hemoglobin   Date Value Ref Range Status   04/03/2023 12.5 (L) 13.0 - 17.7 g/dL Final   03/20/2023 12.5 (L) 13.0 - 17.7 g/dL Final   03/13/2023 13.0 13.0 - 17.7 g/dL Final   03/07/2023 13.8 13.0 - 17.7 g/dL Final   02/28/2023 13.5 13.0 - 17.7 g/dL Final   02/12/2023 11.3 (L) 13.0 - 17.7 g/dL Final   02/11/2023 12.6 (L) 13.0 - 17.7 g/dL Final   02/10/2023 13.2 13.0 - 17.7 g/dL Final   02/09/2023 13.5 13.0 - 17.7 g/dL Final     Platelets   Date Value Ref Range Status   04/03/2023 439 140 - 450 10*3/mm3 Final   03/20/2023 152 140 - 450 10*3/mm3 Final   03/13/2023 244 140 - 450 10*3/mm3 Final   03/07/2023   Corrected     Comment:     Plt clumps. Results removed for inaccuracy per  Code  Corrected result. Previous result was 85 10*3/mm3 on 3/7/2023 at 1148 EST.   02/28/2023 159 140 - 450 10*3/mm3 Final   02/12/2023 150 140 - 450 10*3/mm3 Final   02/11/2023 195 140 - 450 10*3/mm3 Final   02/10/2023 215 140 - 450 10*3/mm3 Final   02/09/2023 260 140 - 450 10*3/mm3 Final       Assessment & Plan   There are no diagnoses linked to this encounter.      Guillerom Salgado   *Squamous cell carcinoma of the gallbladder  · Incidentally found on path review from cholecystectomy.  · Due to  symptomatic cholelithiasis, laparoscopic cholecystectomy 2/11/2023, Dr. 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  5 cm.  Invades perimuscular connective tissue.  Negative for lymph-vascular invasion.  Positive for focal perineural invasion.  Cystic duct margin negative.  Grade 1.  Margins were felt to be negative per pathologist.  Pathologist stage this as a tV3atEx cancer.  · Dr. Horn stated the gallbladder was significantly inflamed and came out in fragments so she is not sure that staging is entirely clinically accurate.  She noted she also sent the patient to Dr. Alex Delgado of hepatobiliary surgery to determine if patient needs formal lymphadenectomy/liver resection.  · Per NCCN guidelines: Needs multiphase abdomen/pelvis CT or MRI with IV contrast and CT chest with or without IV contrast.  Consider staging laparoscopy.  If felt to be resectable, then hepatic resection with lymphadenectomy with or without bile duct excision for malignant involvement.  If felt to be unresectable, then MSI/MMR testing, TMB testing, additional molecular testing to include an TRK.  Options include systemic therapy (preferred), clinical trial (preferred), palliative XRT, or best supportive care.  · Per up-to-date guidelines, for a T2 cancer found incidentally after gallbladder surgery: Reexploration and extended cholecystectomy are also indicated.  Re-exploration identifies residual tumor in 40 to 75% of cases, and a high likelihood of liver involvement and jacki metastasis with T2 disease.  Re-resection significantly increases the likelihood of long-term DFS in patients with T2 disease.  In many series, 5-year OS increased from 25-40%, up to % with aggressive surgery.  Up-to-date authors recommend re-resection to resect at least a 2 cm margin of the liver bed and perform portal/hepatoduodenal ligament lymphadenectomy.  · Up-to-date authors recommend adjuvant therapy for all patients  with completely resected T1b or higher or node positive or margin positive gallbladder cancer.  Up-to-date authors note [facility] suggests adjuvant therapy for all patients with resected gallbladder cancer.  Up-to-date authors note there is no consensus on the optimal adjuvant therapy but they recommend 6 months of postoperative chemotherapy alone with Xeloda monotherapy for most patients.  They note if Xeloda is chosen they start treatment with no more than 1500 mg total dose twice daily.  They note an alternative approaches combining concomitant 5-FU based chemoradiotherapy with 4 months of systemic chemotherapy especially with patients with margin positive disease.  They note in this situation chemotherapy can be with Xeloda monotherapy or Xeloda plus Gemzar.  They note for patients who received chemo XRT plus adjuvant chemo, there is no consensus on sequencing.  In general they state it is preferable to start with chemotherapy first, complete 3 to 4 months of systemic exposure because this will avoid XRT for patients who are destined to develop early distant disease.  · Dr. Alex Delgado, surgical oncology, states although LAD not read on MRI 2/10/2023, he sees bulky LAD on his imaging review.  He states this is currently not resectable due to the bulky LAD and requests chemotherapy in hopes this will become resectable.  He requests avoiding XRT.  · Per the Topaz 1 trial, plan Gemzar 1000 mg/m2 D1, D8 and cisplatin 25 mg/m2 D1, D8 and durvalumab 1500 mg flat dose.  Cycles every 3 weeks.  Up to 8 cycles.  This can be followed by durvalumab 1500 mg flat dose every 4 weeks.  Durvalumab was associated with a longer PFS and a higher objective response rate (26.7% versus 18.7% without durvalumab).  The Topaz 1 trial included previously untreated unresectable locally advanced or metastatic intrahepatic or extrahepatic cholangiocarcinoma or gallbladder cancer.  Also, cisplatin and Gemzar is a common regimen in squamous cell  carcinoma of a more common primary (lung).  Therefore, I feel cisplatin Gemzar durvalumab is the best regimen for this currently unresectable squamous cell carcinoma of the gallbladder.  · Dr. Delgado is recommended a prechemotherapy PET scan and then to check a PET scan again after roughly 2 months of chemotherapy to see if there has been enough improvement to see if this is resectable.  · PET 3/2/2023: Intensely active tanner hepatis nodes, and ill-defined hypodensity in the liver parenchyma adjacent to gallbladder fossa, corresponding to areas of apparent masslike invasion seen on recent MRI, felt to be concerning for malignant invasion of the liver.  A few subcentimeter hypodense lesions in the liver too small to characterize.  Could not exclude peritoneal invasion/carcinomatosis.  Suggestion of a patent active thyroid nodule.  Radiologist recommended correlation with thyroid ultrasound.  · Pretreatment CA 19-9 and CEA normal  · 3/13/2023: Begin Gemzar 1000 mg/m2 D1, D8 and cisplatin 25 mg/m2 D1, D8 and durvalumab 1500 mg flat dose.  Cycles every 3 weeks.  Up to 8 cycles.  This can be followed by durvalumab 1500 mg flat dose every 4 weeks   · (if this cannot be resected, then the Topaz 1 trial continue durvalumab until progression or toxicity.  If this can be resected, likely would not continue single agent durvalumab).  · 3/13/2023 cycle 1 day 1 Gemzar, cisplatin, durvalumab.   · 3/20/2023: Cycle 1 day 8 Gemzar and cisplatin.  Elevated LFTs with AST 86, .  Patient resumed his atorvastatin after discharge from the hospital and is not taking tylenol.  Discussed with Dr. Benedict, we will proceed with Cisplatin and hold Gemzar.  He will return twice weekly for STAT CMP with RN review to monitor liver functions closely.  If worsening LFTs after holding Gemzar, may need to consider steroids.  · 3/23/2023 AST 54, .  · 3/27/2023 AST 25, ALT 57.  With an improvement in LFTs after holding Gemzar, Gemzar likely  the cause.   · 4/3/2023: Cycle 2-day 1 Gemzar, cisplatin, durvalumab.  Unfortunately, LFTs further increased with , .  Total bilirubin normal at 0.9.  We will hold Gemzar today.  If further elevation in LFTs, may need to consider starting steroids.  Patient will return Thursday for a stat CMP with RN review to monitor closely.  This was all discussed with Dr. Neumann (MD #2) in Dr. Benedict's absence.    *Suggestion of PET active thyroid nodule on PET 3/2/2023.  · PET 3/2/2023: Radiologist recommended thyroid ultrasound.    · 3/7/2023: Ordered thyroid ultrasound (but patient understands even if something looks concerning, addressed the gallbladder cancer first and only work on the thyroid nodule if gallbladder cancer treatment has completed and is felt to be in remission  · 3/8/2023: Thyroid ultrasound with right thyroid lobe measuring 5.2 x 2.1 x 1.8 cm in the left thyroid lobe measuring 3.5 x 1.6 x 1.4 cm.  There are multiple thyroid nodules including a 1.5 x 1.1 x 1.1 cm solid-appearing well marginated wider than tall nodule.  TI-RADS-3.  Consider 1 year follow-up if clinically indicated.    · Repeat thyroid ultrasound 1 year from last, approximately 3/2024.    *Not needed now but, regarding surveillance after completing adjuvant therapy:  · Up-to-date authors state there is no agreed-upon surveillance but after completing adjuvant therapy they follow CEA and CA 19 9 every 3 to 4 months for the first 2 years after surgery, then every 6 months for 1 more year with imaging only as clinically indicated.  · NCCN guidelines state consider imaging every 3 to 6 months x 2 years, then every 6 to 12 months for up to 5 years or as clinically indicated and consider CEA and CA 19-9 as clinically indicated.    Plan:   · Hold Gemzar today due to further elevation in LFTs.  · Proceed with cycle 2-day 1 cisplatin, and durvalumab.    · Gemzar D1, D8 and cisplatin D1, D8 and durvalumab 1500 mg flat dose.   · Cycles every 3  weeks.  Up to 8 cycles.    · (Plan PET after roughly 2 months (after 3 cycles) to see if there is enough improvement to see if this is resectable.  · This can be followed by durvalumab 1500 mg flat dose every 4 weeks (single agent durvalumab continues until progression or toxicity if this is not resectable after chemotherapy).    · Return Thursday for stat CMP with RN review to closely monitor LFTs.  · Continue to hold atorvastatin until completion of chemotherapy.  · Arranged appointments through first 3 cycles including PET scan (around 5/8/2023) after cycle 3 and appointment with Dr. Benedict and Dr. Delgado after PET scan, that same week.  Would be due for cycle 4 on 5/15/2023, so would like decisions made regarding surgery prior to that  · Port placement by Dr. Horn.  · Anticipate repeat thyroid ultrasound 1 year from last, approximately 3/2024.     The patient is on high risk medication that requires close monitoring for toxicity.  The patient was discussed with Dr. Neumann (MD #2) in Dr. Benedict's absence.    I spent 45 minutes caring for Guillermo on this date of service. This time includes time spent by me in the following activities: preparing for the visit, reviewing tests, obtaining and/or reviewing a separately obtained history, performing a medically appropriate examination and/or evaluation, counseling and educating the patient/family/caregiver, ordering medications, tests, or procedures, documenting information in the medical record and care coordination.     TY Smalls  04/03/23

## 2023-04-03 ENCOUNTER — INFUSION (OUTPATIENT)
Dept: ONCOLOGY | Facility: HOSPITAL | Age: 73
End: 2023-04-03
Payer: MEDICARE

## 2023-04-03 ENCOUNTER — OFFICE VISIT (OUTPATIENT)
Dept: ONCOLOGY | Facility: CLINIC | Age: 73
End: 2023-04-03
Payer: MEDICARE

## 2023-04-03 VITALS
HEART RATE: 89 BPM | OXYGEN SATURATION: 97 % | TEMPERATURE: 97.8 F | WEIGHT: 156.9 LBS | RESPIRATION RATE: 18 BRPM | DIASTOLIC BLOOD PRESSURE: 81 MMHG | SYSTOLIC BLOOD PRESSURE: 126 MMHG | HEIGHT: 68 IN | BODY MASS INDEX: 23.78 KG/M2

## 2023-04-03 VITALS — BODY MASS INDEX: 23.95 KG/M2 | WEIGHT: 158 LBS

## 2023-04-03 DIAGNOSIS — C23 GALLBLADDER CANCER: Primary | ICD-10-CM

## 2023-04-03 DIAGNOSIS — Z45.9 ENCOUNTER FOR MANAGEMENT OF IMPLANTED DEVICE: ICD-10-CM

## 2023-04-03 DIAGNOSIS — Z79.899 HIGH RISK MEDICATION USE: ICD-10-CM

## 2023-04-03 DIAGNOSIS — R79.89 ELEVATED LFTS: ICD-10-CM

## 2023-04-03 LAB
ALBUMIN SERPL-MCNC: 4.1 G/DL (ref 3.5–5.2)
ALBUMIN/GLOB SERPL: 1.2 G/DL (ref 1.1–2.4)
ALP SERPL-CCNC: 233 U/L (ref 38–116)
ALT SERPL W P-5'-P-CCNC: 213 U/L (ref 0–41)
ANION GAP SERPL CALCULATED.3IONS-SCNC: 10.5 MMOL/L (ref 5–15)
AST SERPL-CCNC: 116 U/L (ref 0–40)
BASOPHILS # BLD AUTO: 0.08 10*3/MM3 (ref 0–0.2)
BASOPHILS NFR BLD AUTO: 1.2 % (ref 0–1.5)
BILIRUB SERPL-MCNC: 0.9 MG/DL (ref 0.2–1.2)
BUN SERPL-MCNC: 16 MG/DL (ref 6–20)
BUN/CREAT SERPL: 20 (ref 7.3–30)
CALCIUM SPEC-SCNC: 9.7 MG/DL (ref 8.5–10.2)
CHLORIDE SERPL-SCNC: 101 MMOL/L (ref 98–107)
CO2 SERPL-SCNC: 25.5 MMOL/L (ref 22–29)
CREAT SERPL-MCNC: 0.8 MG/DL (ref 0.7–1.3)
DEPRECATED RDW RBC AUTO: 43.9 FL (ref 37–54)
EGFRCR SERPLBLD CKD-EPI 2021: 92.9 ML/MIN/1.73
EOSINOPHIL # BLD AUTO: 0.05 10*3/MM3 (ref 0–0.4)
EOSINOPHIL NFR BLD AUTO: 0.8 % (ref 0.3–6.2)
ERYTHROCYTE [DISTWIDTH] IN BLOOD BY AUTOMATED COUNT: 13.2 % (ref 12.3–15.4)
GLOBULIN UR ELPH-MCNC: 3.5 GM/DL (ref 1.8–3.5)
GLUCOSE SERPL-MCNC: 110 MG/DL (ref 74–124)
HCT VFR BLD AUTO: 37.4 % (ref 37.5–51)
HGB BLD-MCNC: 12.5 G/DL (ref 13–17.7)
IMM GRANULOCYTES # BLD AUTO: 0.03 10*3/MM3 (ref 0–0.05)
IMM GRANULOCYTES NFR BLD AUTO: 0.5 % (ref 0–0.5)
LYMPHOCYTES # BLD AUTO: 1.13 10*3/MM3 (ref 0.7–3.1)
LYMPHOCYTES NFR BLD AUTO: 17.3 % (ref 19.6–45.3)
MAGNESIUM SERPL-MCNC: 2.1 MG/DL (ref 1.8–2.5)
MCH RBC QN AUTO: 31 PG (ref 26.6–33)
MCHC RBC AUTO-ENTMCNC: 33.4 G/DL (ref 31.5–35.7)
MCV RBC AUTO: 92.8 FL (ref 79–97)
MONOCYTES # BLD AUTO: 1.19 10*3/MM3 (ref 0.1–0.9)
MONOCYTES NFR BLD AUTO: 18.2 % (ref 5–12)
NEUTROPHILS NFR BLD AUTO: 4.07 10*3/MM3 (ref 1.7–7)
NEUTROPHILS NFR BLD AUTO: 62 % (ref 42.7–76)
NRBC BLD AUTO-RTO: 0 /100 WBC (ref 0–0.2)
PLATELET # BLD AUTO: 439 10*3/MM3 (ref 140–450)
PMV BLD AUTO: 8.7 FL (ref 6–12)
POTASSIUM SERPL-SCNC: 4.6 MMOL/L (ref 3.5–4.7)
PROT SERPL-MCNC: 7.6 G/DL (ref 6.3–8)
RBC # BLD AUTO: 4.03 10*6/MM3 (ref 4.14–5.8)
SODIUM SERPL-SCNC: 137 MMOL/L (ref 134–145)
WBC NRBC COR # BLD: 6.55 10*3/MM3 (ref 3.4–10.8)

## 2023-04-03 PROCEDURE — 25010000002 MAGNESIUM SULFATE PER 500 MG OF MAGNESIUM: Performed by: NURSE PRACTITIONER

## 2023-04-03 PROCEDURE — A9270 NON-COVERED ITEM OR SERVICE: HCPCS | Performed by: NURSE PRACTITIONER

## 2023-04-03 PROCEDURE — 25010000002 HEPARIN LOCK FLUSH PER 10 UNITS: Performed by: INTERNAL MEDICINE

## 2023-04-03 PROCEDURE — 99214 OFFICE O/P EST MOD 30 MIN: CPT | Performed by: NURSE PRACTITIONER

## 2023-04-03 PROCEDURE — 96367 TX/PROPH/DG ADDL SEQ IV INF: CPT

## 2023-04-03 PROCEDURE — 63710000001 POTASSIUM CHLORIDE 20 MEQ TABLET CONTROLLED-RELEASE: Performed by: NURSE PRACTITIONER

## 2023-04-03 PROCEDURE — 96413 CHEMO IV INFUSION 1 HR: CPT

## 2023-04-03 PROCEDURE — 25010000002 FOSAPREPITANT PER 1 MG: Performed by: NURSE PRACTITIONER

## 2023-04-03 PROCEDURE — 1126F AMNT PAIN NOTED NONE PRSNT: CPT | Performed by: NURSE PRACTITIONER

## 2023-04-03 PROCEDURE — 83735 ASSAY OF MAGNESIUM: CPT

## 2023-04-03 PROCEDURE — 85025 COMPLETE CBC W/AUTO DIFF WBC: CPT

## 2023-04-03 PROCEDURE — 63710000001 OLANZAPINE 5 MG TABLET: Performed by: NURSE PRACTITIONER

## 2023-04-03 PROCEDURE — 25010000002 CISPLATIN PER 50 MG: Performed by: NURSE PRACTITIONER

## 2023-04-03 PROCEDURE — 80053 COMPREHEN METABOLIC PANEL: CPT

## 2023-04-03 PROCEDURE — 25010000002 PALONOSETRON PER 25 MCG: Performed by: NURSE PRACTITIONER

## 2023-04-03 PROCEDURE — 25010000002 DEXAMETHASONE SODIUM PHOSPHATE 100 MG/10ML SOLUTION: Performed by: NURSE PRACTITIONER

## 2023-04-03 PROCEDURE — 3074F SYST BP LT 130 MM HG: CPT | Performed by: NURSE PRACTITIONER

## 2023-04-03 PROCEDURE — 96366 THER/PROPH/DIAG IV INF ADDON: CPT

## 2023-04-03 PROCEDURE — 96417 CHEMO IV INFUS EACH ADDL SEQ: CPT

## 2023-04-03 PROCEDURE — 96361 HYDRATE IV INFUSION ADD-ON: CPT

## 2023-04-03 PROCEDURE — 1160F RVW MEDS BY RX/DR IN RCRD: CPT | Performed by: NURSE PRACTITIONER

## 2023-04-03 PROCEDURE — 25010000002 MAGNESIUM SULFATE PER 500 MG OF MAGNESIUM: Performed by: INTERNAL MEDICINE

## 2023-04-03 PROCEDURE — 25010000002 DURVALUMAB 50 MG/ML SOLUTION 10 ML VIAL: Performed by: NURSE PRACTITIONER

## 2023-04-03 PROCEDURE — 3079F DIAST BP 80-89 MM HG: CPT | Performed by: NURSE PRACTITIONER

## 2023-04-03 PROCEDURE — 96375 TX/PRO/DX INJ NEW DRUG ADDON: CPT

## 2023-04-03 PROCEDURE — 1159F MED LIST DOCD IN RCRD: CPT | Performed by: NURSE PRACTITIONER

## 2023-04-03 RX ORDER — OLANZAPINE 5 MG/1
5 TABLET ORAL ONCE
Status: COMPLETED | OUTPATIENT
Start: 2023-04-03 | End: 2023-04-03

## 2023-04-03 RX ORDER — SODIUM CHLORIDE 0.9 % (FLUSH) 0.9 %
10 SYRINGE (ML) INJECTION AS NEEDED
OUTPATIENT
Start: 2023-04-03

## 2023-04-03 RX ORDER — SODIUM CHLORIDE 9 MG/ML
250 INJECTION, SOLUTION INTRAVENOUS ONCE
Status: CANCELLED | OUTPATIENT
Start: 2023-04-03

## 2023-04-03 RX ORDER — HEPARIN SODIUM (PORCINE) LOCK FLUSH IV SOLN 100 UNIT/ML 100 UNIT/ML
500 SOLUTION INTRAVENOUS AS NEEDED
OUTPATIENT
Start: 2023-04-03

## 2023-04-03 RX ORDER — OLANZAPINE 5 MG/1
5 TABLET ORAL ONCE
Status: CANCELLED | OUTPATIENT
Start: 2023-04-03

## 2023-04-03 RX ORDER — PALONOSETRON 0.05 MG/ML
0.25 INJECTION, SOLUTION INTRAVENOUS ONCE
Status: CANCELLED | OUTPATIENT
Start: 2023-04-03

## 2023-04-03 RX ORDER — PALONOSETRON 0.05 MG/ML
0.25 INJECTION, SOLUTION INTRAVENOUS ONCE
Status: COMPLETED | OUTPATIENT
Start: 2023-04-03 | End: 2023-04-03

## 2023-04-03 RX ORDER — HEPARIN SODIUM (PORCINE) LOCK FLUSH IV SOLN 100 UNIT/ML 100 UNIT/ML
500 SOLUTION INTRAVENOUS AS NEEDED
Status: DISCONTINUED | OUTPATIENT
Start: 2023-04-03 | End: 2023-04-03 | Stop reason: HOSPADM

## 2023-04-03 RX ORDER — POTASSIUM CHLORIDE 20 MEQ/1
20 TABLET, EXTENDED RELEASE ORAL ONCE
Status: CANCELLED
Start: 2023-04-03

## 2023-04-03 RX ORDER — SODIUM CHLORIDE 0.9 % (FLUSH) 0.9 %
10 SYRINGE (ML) INJECTION AS NEEDED
Status: DISCONTINUED | OUTPATIENT
Start: 2023-04-03 | End: 2023-04-03 | Stop reason: HOSPADM

## 2023-04-03 RX ORDER — POTASSIUM CHLORIDE 20 MEQ/1
20 TABLET, EXTENDED RELEASE ORAL ONCE
Status: COMPLETED | OUTPATIENT
Start: 2023-04-03 | End: 2023-04-03

## 2023-04-03 RX ORDER — SODIUM CHLORIDE 9 MG/ML
250 INJECTION, SOLUTION INTRAVENOUS ONCE
Status: COMPLETED | OUTPATIENT
Start: 2023-04-03 | End: 2023-04-03

## 2023-04-03 RX ADMIN — DEXAMETHASONE SODIUM PHOSPHATE 12 MG: 10 INJECTION, SOLUTION INTRAMUSCULAR; INTRAVENOUS at 11:48

## 2023-04-03 RX ADMIN — SODIUM CHLORIDE 250 ML: 9 INJECTION, SOLUTION INTRAVENOUS at 10:10

## 2023-04-03 RX ADMIN — Medication 500 UNITS: at 14:22

## 2023-04-03 RX ADMIN — PALONOSETRON 0.25 MG: 0.05 INJECTION, SOLUTION INTRAVENOUS at 11:12

## 2023-04-03 RX ADMIN — SODIUM CHLORIDE 100 ML: 9 INJECTION, SOLUTION INTRAVENOUS at 11:14

## 2023-04-03 RX ADMIN — OLANZAPINE 5 MG: 5 TABLET, FILM COATED ORAL at 11:13

## 2023-04-03 RX ADMIN — SODIUM CHLORIDE 1500 MG: 900 INJECTION, SOLUTION INTRAVENOUS at 10:10

## 2023-04-03 RX ADMIN — CISPLATIN 46 MG: 1 INJECTION, SOLUTION INTRAVENOUS at 12:06

## 2023-04-03 RX ADMIN — POTASSIUM CHLORIDE 20 MEQ: 20 TABLET, EXTENDED RELEASE ORAL at 13:21

## 2023-04-03 RX ADMIN — MAGNESIUM SULFATE HEPTAHYDRATE 500 ML/HR: 500 INJECTION, SOLUTION INTRAMUSCULAR; INTRAVENOUS at 09:14

## 2023-04-03 RX ADMIN — MAGNESIUM SULFATE HEPTAHYDRATE 500 ML/HR: 500 INJECTION, SOLUTION INTRAMUSCULAR; INTRAVENOUS at 13:11

## 2023-04-03 RX ADMIN — Medication 10 ML: at 14:21

## 2023-04-03 NOTE — NURSING NOTE
Lab Results   Component Value Date    GLUCOSE 110 04/03/2023    BUN 16 04/03/2023    CREATININE 0.80 04/03/2023    EGFR 92.9 04/03/2023    BCR 20.0 04/03/2023    K 4.6 04/03/2023    CO2 25.5 04/03/2023    CALCIUM 9.7 04/03/2023    ALBUMIN 4.1 04/03/2023    BILITOT 0.9 04/03/2023     (C) 04/03/2023     (C) 04/03/2023     S/w CONRADO Ho okay to treat, pt treatment plan changed due to liver enzymes, no gemzar today, will receive cisplatin and imfinzi per NP.

## 2023-04-05 ENCOUNTER — TELEPHONE (OUTPATIENT)
Dept: ONCOLOGY | Facility: CLINIC | Age: 73
End: 2023-04-05
Payer: MEDICARE

## 2023-04-05 NOTE — TELEPHONE ENCOUNTER
Spoke with Mr. Salgado regarding insurance. He had a question regarding his Medicare 20%. Full note in the billing notes.

## 2023-04-07 ENCOUNTER — LAB (OUTPATIENT)
Dept: LAB | Facility: HOSPITAL | Age: 73
End: 2023-04-07
Payer: MEDICARE

## 2023-04-07 ENCOUNTER — CLINICAL SUPPORT (OUTPATIENT)
Dept: ONCOLOGY | Facility: HOSPITAL | Age: 73
End: 2023-04-07
Payer: MEDICARE

## 2023-04-07 DIAGNOSIS — C23 GALLBLADDER CANCER: ICD-10-CM

## 2023-04-07 DIAGNOSIS — R79.89 ELEVATED LFTS: ICD-10-CM

## 2023-04-07 DIAGNOSIS — Z79.899 HIGH RISK MEDICATION USE: ICD-10-CM

## 2023-04-07 LAB
ALBUMIN SERPL-MCNC: 4.1 G/DL (ref 3.5–5.2)
ALBUMIN/GLOB SERPL: 1.1 G/DL (ref 1.1–2.4)
ALP SERPL-CCNC: 186 U/L (ref 38–116)
ALT SERPL W P-5'-P-CCNC: 100 U/L (ref 0–41)
ANION GAP SERPL CALCULATED.3IONS-SCNC: 9.6 MMOL/L (ref 5–15)
AST SERPL-CCNC: 22 U/L (ref 0–40)
BILIRUB SERPL-MCNC: 0.7 MG/DL (ref 0.2–1.2)
BUN SERPL-MCNC: 15 MG/DL (ref 6–20)
BUN/CREAT SERPL: 15.5 (ref 7.3–30)
CALCIUM SPEC-SCNC: 10 MG/DL (ref 8.5–10.2)
CHLORIDE SERPL-SCNC: 100 MMOL/L (ref 98–107)
CO2 SERPL-SCNC: 28.4 MMOL/L (ref 22–29)
CREAT SERPL-MCNC: 0.97 MG/DL (ref 0.7–1.3)
EGFRCR SERPLBLD CKD-EPI 2021: 81.9 ML/MIN/1.73
GLOBULIN UR ELPH-MCNC: 3.8 GM/DL (ref 1.8–3.5)
GLUCOSE SERPL-MCNC: 105 MG/DL (ref 74–124)
POTASSIUM SERPL-SCNC: 4.7 MMOL/L (ref 3.5–4.7)
PROT SERPL-MCNC: 7.9 G/DL (ref 6.3–8)
SODIUM SERPL-SCNC: 138 MMOL/L (ref 134–145)

## 2023-04-07 PROCEDURE — G0463 HOSPITAL OUTPT CLINIC VISIT: HCPCS

## 2023-04-07 PROCEDURE — 80053 COMPREHEN METABOLIC PANEL: CPT

## 2023-04-07 PROCEDURE — 36415 COLL VENOUS BLD VENIPUNCTURE: CPT

## 2023-04-07 NOTE — NURSING NOTE
Patient is here for lab review with RN. Stat CMP drawn. Patient has no complaints. He states that he is holding his Lipitor. He reports drinking at least 48 ounces of water as well as other liquids every day. Denies drinking alcohol or taking Tylenol. Follow up appointment reviewed. Patient is instructed to call the office with any concerns or new symptoms prior to next visit. Patient verbalized understanding and discharged in stable condition. Will call patient with results of liver enzymes.

## 2023-04-07 NOTE — NURSING NOTE
Spoke to patient on telephone to inform him of improved ALT and AST results. Verbalized understanding.  Lab Results   Component Value Date    GLUCOSE 105 04/07/2023    BUN 15 04/07/2023    CREATININE 0.97 04/07/2023    EGFR 81.9 04/07/2023    BCR 15.5 04/07/2023    K 4.7 04/07/2023    CO2 28.4 04/07/2023    CALCIUM 10.0 04/07/2023    ALBUMIN 4.1 04/07/2023    BILITOT 0.7 04/07/2023    AST 22 04/07/2023     (C) 04/07/2023

## 2023-04-10 ENCOUNTER — INFUSION (OUTPATIENT)
Dept: ONCOLOGY | Facility: HOSPITAL | Age: 73
End: 2023-04-10
Payer: MEDICARE

## 2023-04-10 ENCOUNTER — DOCUMENTATION (OUTPATIENT)
Dept: ONCOLOGY | Facility: CLINIC | Age: 73
End: 2023-04-10
Payer: MEDICARE

## 2023-04-10 ENCOUNTER — OFFICE VISIT (OUTPATIENT)
Dept: ONCOLOGY | Facility: CLINIC | Age: 73
End: 2023-04-10
Payer: MEDICARE

## 2023-04-10 VITALS
SYSTOLIC BLOOD PRESSURE: 121 MMHG | TEMPERATURE: 97.1 F | HEART RATE: 83 BPM | WEIGHT: 157.1 LBS | HEIGHT: 68 IN | RESPIRATION RATE: 18 BRPM | BODY MASS INDEX: 23.81 KG/M2 | OXYGEN SATURATION: 98 % | DIASTOLIC BLOOD PRESSURE: 83 MMHG

## 2023-04-10 VITALS — WEIGHT: 159.2 LBS | BODY MASS INDEX: 24.13 KG/M2

## 2023-04-10 DIAGNOSIS — Z79.899 HIGH RISK MEDICATION USE: ICD-10-CM

## 2023-04-10 DIAGNOSIS — C23 GALLBLADDER CANCER: ICD-10-CM

## 2023-04-10 DIAGNOSIS — C23 GALLBLADDER CANCER: Primary | ICD-10-CM

## 2023-04-10 DIAGNOSIS — R79.89 ELEVATED LFTS: ICD-10-CM

## 2023-04-10 LAB
ALBUMIN SERPL-MCNC: 4 G/DL (ref 3.5–5.2)
ALBUMIN/GLOB SERPL: 1.1 G/DL (ref 1.1–2.4)
ALP SERPL-CCNC: 144 U/L (ref 38–116)
ALT SERPL W P-5'-P-CCNC: 49 U/L (ref 0–41)
ANION GAP SERPL CALCULATED.3IONS-SCNC: 11.5 MMOL/L (ref 5–15)
AST SERPL-CCNC: 18 U/L (ref 0–40)
BASOPHILS # BLD AUTO: 0.11 10*3/MM3 (ref 0–0.2)
BASOPHILS NFR BLD AUTO: 1.4 % (ref 0–1.5)
BILIRUB SERPL-MCNC: 0.4 MG/DL (ref 0.2–1.2)
BUN SERPL-MCNC: 17 MG/DL (ref 6–20)
BUN/CREAT SERPL: 20 (ref 7.3–30)
CALCIUM SPEC-SCNC: 9.6 MG/DL (ref 8.5–10.2)
CHLORIDE SERPL-SCNC: 100 MMOL/L (ref 98–107)
CO2 SERPL-SCNC: 24.5 MMOL/L (ref 22–29)
CREAT SERPL-MCNC: 0.85 MG/DL (ref 0.7–1.3)
DEPRECATED RDW RBC AUTO: 41.8 FL (ref 37–54)
EGFRCR SERPLBLD CKD-EPI 2021: 91.2 ML/MIN/1.73
EOSINOPHIL # BLD AUTO: 0.09 10*3/MM3 (ref 0–0.4)
EOSINOPHIL NFR BLD AUTO: 1.1 % (ref 0.3–6.2)
ERYTHROCYTE [DISTWIDTH] IN BLOOD BY AUTOMATED COUNT: 12.7 % (ref 12.3–15.4)
GLOBULIN UR ELPH-MCNC: 3.6 GM/DL (ref 1.8–3.5)
GLUCOSE SERPL-MCNC: 113 MG/DL (ref 74–124)
HCT VFR BLD AUTO: 37.8 % (ref 37.5–51)
HGB BLD-MCNC: 12.6 G/DL (ref 13–17.7)
IMM GRANULOCYTES # BLD AUTO: 0.04 10*3/MM3 (ref 0–0.05)
IMM GRANULOCYTES NFR BLD AUTO: 0.5 % (ref 0–0.5)
LYMPHOCYTES # BLD AUTO: 1.61 10*3/MM3 (ref 0.7–3.1)
LYMPHOCYTES NFR BLD AUTO: 20.5 % (ref 19.6–45.3)
MAGNESIUM SERPL-MCNC: 2.1 MG/DL (ref 1.8–2.5)
MCH RBC QN AUTO: 30.4 PG (ref 26.6–33)
MCHC RBC AUTO-ENTMCNC: 33.3 G/DL (ref 31.5–35.7)
MCV RBC AUTO: 91.3 FL (ref 79–97)
MONOCYTES # BLD AUTO: 1.33 10*3/MM3 (ref 0.1–0.9)
MONOCYTES NFR BLD AUTO: 16.9 % (ref 5–12)
NEUTROPHILS NFR BLD AUTO: 4.67 10*3/MM3 (ref 1.7–7)
NEUTROPHILS NFR BLD AUTO: 59.6 % (ref 42.7–76)
NRBC BLD AUTO-RTO: 0 /100 WBC (ref 0–0.2)
PLATELET # BLD AUTO: 309 10*3/MM3 (ref 140–450)
PMV BLD AUTO: 8.7 FL (ref 6–12)
POTASSIUM SERPL-SCNC: 4.7 MMOL/L (ref 3.5–4.7)
PROT SERPL-MCNC: 7.6 G/DL (ref 6.3–8)
RBC # BLD AUTO: 4.14 10*6/MM3 (ref 4.14–5.8)
SODIUM SERPL-SCNC: 136 MMOL/L (ref 134–145)
WBC NRBC COR # BLD: 7.85 10*3/MM3 (ref 3.4–10.8)

## 2023-04-10 PROCEDURE — 96367 TX/PROPH/DG ADDL SEQ IV INF: CPT

## 2023-04-10 PROCEDURE — 25010000002 MAGNESIUM SULFATE PER 500 MG OF MAGNESIUM: Performed by: NURSE PRACTITIONER

## 2023-04-10 PROCEDURE — 99214 OFFICE O/P EST MOD 30 MIN: CPT | Performed by: NURSE PRACTITIONER

## 2023-04-10 PROCEDURE — 25010000002 GEMCITABINE HCL 2 GM/20ML SOLUTION 20 ML VIAL: Performed by: NURSE PRACTITIONER

## 2023-04-10 PROCEDURE — 25010000002 DEXAMETHASONE SODIUM PHOSPHATE 100 MG/10ML SOLUTION: Performed by: NURSE PRACTITIONER

## 2023-04-10 PROCEDURE — 63710000001 OLANZAPINE 5 MG TABLET: Performed by: NURSE PRACTITIONER

## 2023-04-10 PROCEDURE — A9270 NON-COVERED ITEM OR SERVICE: HCPCS | Performed by: NURSE PRACTITIONER

## 2023-04-10 PROCEDURE — 1159F MED LIST DOCD IN RCRD: CPT | Performed by: NURSE PRACTITIONER

## 2023-04-10 PROCEDURE — 1126F AMNT PAIN NOTED NONE PRSNT: CPT | Performed by: NURSE PRACTITIONER

## 2023-04-10 PROCEDURE — 80053 COMPREHEN METABOLIC PANEL: CPT

## 2023-04-10 PROCEDURE — 25010000002 FOSAPREPITANT PER 1 MG: Performed by: NURSE PRACTITIONER

## 2023-04-10 PROCEDURE — 25010000002 CISPLATIN PER 50 MG: Performed by: NURSE PRACTITIONER

## 2023-04-10 PROCEDURE — 96366 THER/PROPH/DIAG IV INF ADDON: CPT

## 2023-04-10 PROCEDURE — 3074F SYST BP LT 130 MM HG: CPT | Performed by: NURSE PRACTITIONER

## 2023-04-10 PROCEDURE — 63710000001 POTASSIUM CHLORIDE 20 MEQ TABLET CONTROLLED-RELEASE: Performed by: NURSE PRACTITIONER

## 2023-04-10 PROCEDURE — 85025 COMPLETE CBC W/AUTO DIFF WBC: CPT

## 2023-04-10 PROCEDURE — 96413 CHEMO IV INFUSION 1 HR: CPT

## 2023-04-10 PROCEDURE — 83735 ASSAY OF MAGNESIUM: CPT

## 2023-04-10 PROCEDURE — 96417 CHEMO IV INFUS EACH ADDL SEQ: CPT

## 2023-04-10 PROCEDURE — 96375 TX/PRO/DX INJ NEW DRUG ADDON: CPT

## 2023-04-10 PROCEDURE — 3079F DIAST BP 80-89 MM HG: CPT | Performed by: NURSE PRACTITIONER

## 2023-04-10 PROCEDURE — 25010000002 PALONOSETRON PER 25 MCG: Performed by: NURSE PRACTITIONER

## 2023-04-10 RX ORDER — SODIUM CHLORIDE 9 MG/ML
250 INJECTION, SOLUTION INTRAVENOUS ONCE
Status: COMPLETED | OUTPATIENT
Start: 2023-04-10 | End: 2023-04-10

## 2023-04-10 RX ORDER — PALONOSETRON 0.05 MG/ML
0.25 INJECTION, SOLUTION INTRAVENOUS ONCE
Status: COMPLETED | OUTPATIENT
Start: 2023-04-10 | End: 2023-04-10

## 2023-04-10 RX ORDER — POTASSIUM CHLORIDE 20 MEQ/1
20 TABLET, EXTENDED RELEASE ORAL ONCE
Status: CANCELLED
Start: 2023-04-10

## 2023-04-10 RX ORDER — POTASSIUM CHLORIDE 20 MEQ/1
20 TABLET, EXTENDED RELEASE ORAL ONCE
Status: COMPLETED | OUTPATIENT
Start: 2023-04-10 | End: 2023-04-10

## 2023-04-10 RX ORDER — SODIUM CHLORIDE 9 MG/ML
250 INJECTION, SOLUTION INTRAVENOUS ONCE
Status: CANCELLED | OUTPATIENT
Start: 2023-04-10

## 2023-04-10 RX ORDER — OLANZAPINE 5 MG/1
5 TABLET ORAL ONCE
Status: COMPLETED | OUTPATIENT
Start: 2023-04-10 | End: 2023-04-10

## 2023-04-10 RX ADMIN — GEMCITABINE 1400 MG: 100 INJECTION, SOLUTION INTRAVENOUS at 11:13

## 2023-04-10 RX ADMIN — OLANZAPINE 5 MG: 5 TABLET, FILM COATED ORAL at 10:17

## 2023-04-10 RX ADMIN — MAGNESIUM SULFATE HEPTAHYDRATE 500 ML/HR: 500 INJECTION, SOLUTION INTRAMUSCULAR; INTRAVENOUS at 09:22

## 2023-04-10 RX ADMIN — SODIUM CHLORIDE 100 ML: 9 INJECTION, SOLUTION INTRAVENOUS at 10:33

## 2023-04-10 RX ADMIN — POTASSIUM CHLORIDE 20 MEQ: 20 TABLET, EXTENDED RELEASE ORAL at 13:14

## 2023-04-10 RX ADMIN — SODIUM CHLORIDE 250 ML: 9 INJECTION, SOLUTION INTRAVENOUS at 09:22

## 2023-04-10 RX ADMIN — MAGNESIUM SULFATE HEPTAHYDRATE 500 ML/HR: 500 INJECTION, SOLUTION INTRAMUSCULAR; INTRAVENOUS at 12:55

## 2023-04-10 RX ADMIN — DEXAMETHASONE SODIUM PHOSPHATE 12 MG: 10 INJECTION, SOLUTION INTRAMUSCULAR; INTRAVENOUS at 10:17

## 2023-04-10 RX ADMIN — PALONOSETRON 0.25 MG: 0.05 INJECTION, SOLUTION INTRAVENOUS at 10:18

## 2023-04-10 RX ADMIN — CISPLATIN 46 MG: 1 INJECTION, SOLUTION INTRAVENOUS at 11:51

## 2023-04-18 NOTE — PROGRESS NOTES
.     REASON FOR FOLLOWUP :   Squamous cell carcinoma of the gallbladder    HISTORY OF PRESENT ILLNESS:  The patient is a 72 y.o. year old male  who is here for follow-up with the above-mentioned history.    He returns today for follow-up and evaluation prior to cycle 3-day 1 Gemzar, cisplatin, durvalumab (25% dose reduction in Gemzar due to elevated LFTs). He reports feeling great last week after his treatment.  Energy was at baseline and he mowed the yard and was very active.  Appetite adequate.  Bowels moving regularly.  Denies arthralgiasDenies fever or chills.  Denies nausea or vomiting.  Denies new or worsening pain.  Denies signs or symptoms of peripheral neuropathy.  Denies new or worsening tinnitus.  No new concerns today.      Past Medical History:   Diagnosis Date   • Cancer     gallbladder   • Gilbert syndrome    • Hyperlipidemia    • Hypertension      Past Surgical History:   Procedure Laterality Date   • CHOLECYSTECTOMY WITH INTRAOPERATIVE CHOLANGIOGRAM N/A 02/11/2023    Procedure: CHOLECYSTECTOMY LAPAROSCOPIC INTRAOPERATIVE CHOLANGIOGRAM;  Surgeon: Vianey Horn MD;  Location: [facility] OR;  Service: General;  Laterality: N/A;   • COLONOSCOPY     • PILONIDAL CYST DRAINAGE     • VENOUS ACCESS DEVICE (PORT) INSERTION N/A 3/9/2023    Procedure: INSERTION VENOUS ACCESS DEVICE;  Surgeon: Vianey Horn MD;  Location: [facility], [place], operated by [facility];  Service: General;  Laterality: N/A;       MEDICATIONS    Current Outpatient Medications:   •  lisinopril (PRINIVIL,ZESTRIL) 20 MG tablet, Take 1 tablet by mouth Daily., Disp: , Rfl:   •  OLANZapine (zyPREXA) 5 MG tablet, Take 1 tablet by mouth Every Night. Take on day 2, 3, and 4 and Days 9, 10, 11 after chemotherapy., Disp: 48 tablet, Rfl: 0  •  ondansetron (ZOFRAN) 8 MG tablet, Take 1 tablet by mouth 3 (Three) Times a Day As Needed for Nausea or Vomiting., Disp: 30 tablet, Rfl: 5  No current facility-administered medications for this  "visit.    Facility-Administered Medications Ordered in Other Visits:   •  magnesium sulfate 0.5 g in sodium chloride 0.9 % 500 mL infusion, 500 mL/hr, Intravenous, Once, Georgia Julian APRN  •  sodium chloride 0.9 % infusion 250 mL, 250 mL, Intravenous, Once, Georgia Julian APRN    ALLERGIES:   No Known Allergies    SOCIAL HISTORY:       Social History     Socioeconomic History   • Marital status:      Spouse name: Oly   Tobacco Use   • Smoking status: Former     Types: Cigarettes   Vaping Use   • Vaping Use: Never used   Substance and Sexual Activity   • Alcohol use: Yes   • Drug use: Never   • Sexual activity: Defer         FAMILY HISTORY:  Family History   Problem Relation Age of Onset   • Malig Hyperthermia Neg Hx      REVIEW OF SYSTEMS:  Review of Systems   Constitutional: Negative for activity change.   HENT: Negative for nosebleeds and trouble swallowing.    Respiratory: Negative for shortness of breath and wheezing.    Cardiovascular: Negative for chest pain and palpitations.   Gastrointestinal: Negative for constipation, diarrhea and nausea.   Genitourinary: Negative for dysuria and hematuria.   Musculoskeletal: Negative for arthralgias and myalgias.   Neurological: Negative for seizures and syncope.   Hematological: Negative for adenopathy. Does not bruise/bleed easily.   Psychiatric/Behavioral: Negative for confusion.          Vitals:    04/24/23 0808   BP: 145/85   Pulse: 102   Resp: 18   Temp: 98.4 °F (36.9 °C)   TempSrc: Temporal   SpO2: 99%   Weight: 71.9 kg (158 lb 9.6 oz)   Height: 173 cm (68.11\")   PainSc: 0-No pain         4/24/2023     8:09 AM   Current Status   ECOG score 0      PHYSICAL EXAM:      CONSTITUTIONAL:  Vital signs reviewed.  No distress, looks comfortable.  EYES:  Conjunctiva and lids unremarkable.  PERRLA  EARS,NOSE,MOUTH,THROAT:  Ears and nose appear unremarkable.  Lips, teeth, gums appear unremarkable.  RESPIRATORY:  Normal respiratory effort.  Lungs clear to " auscultation bilaterally.  CARDIOVASCULAR:  Normal S1, S2.  No murmurs rubs or gallops.  No significant lower extremity edema.  GASTROINTESTINAL: Abdomen appears unremarkable.  Nontender.  No hepatomegaly.  No splenomegaly.  LYMPHATIC:  No cervical, supraclavicular, axillary lymphadenopathy.  SKIN:  Warm.  No rashes.  PSYCHIATRIC:  Normal judgment and insight.  Normal mood and affect.       RECENT LABS:        WBC   Date Value Ref Range Status   04/24/2023 4.61 3.40 - 10.80 10*3/mm3 Final   04/10/2023 7.85 3.40 - 10.80 10*3/mm3 Final   04/03/2023 6.55 3.40 - 10.80 10*3/mm3 Final   03/20/2023 4.31 3.40 - 10.80 10*3/mm3 Final   03/13/2023 7.30 3.40 - 10.80 10*3/mm3 Final   03/07/2023 8.40 3.40 - 10.80 10*3/mm3 Final   02/28/2023 8.11 3.40 - 10.80 10*3/mm3 Final   02/12/2023 8.60 3.40 - 10.80 10*3/mm3 Final   02/11/2023 6.21 3.40 - 10.80 10*3/mm3 Final   02/10/2023 5.40 3.40 - 10.80 10*3/mm3 Final   02/09/2023 6.09 3.40 - 10.80 10*3/mm3 Final     Hemoglobin   Date Value Ref Range Status   04/24/2023 11.7 (L) 13.0 - 17.7 g/dL Final   04/10/2023 12.6 (L) 13.0 - 17.7 g/dL Final   04/03/2023 12.5 (L) 13.0 - 17.7 g/dL Final   03/20/2023 12.5 (L) 13.0 - 17.7 g/dL Final   03/13/2023 13.0 13.0 - 17.7 g/dL Final   03/07/2023 13.8 13.0 - 17.7 g/dL Final   02/28/2023 13.5 13.0 - 17.7 g/dL Final   02/12/2023 11.3 (L) 13.0 - 17.7 g/dL Final   02/11/2023 12.6 (L) 13.0 - 17.7 g/dL Final   02/10/2023 13.2 13.0 - 17.7 g/dL Final   02/09/2023 13.5 13.0 - 17.7 g/dL Final     Platelets   Date Value Ref Range Status   04/24/2023 223 140 - 450 10*3/mm3 Final   04/10/2023 309 140 - 450 10*3/mm3 Final   04/03/2023 439 140 - 450 10*3/mm3 Final   03/20/2023 152 140 - 450 10*3/mm3 Final   03/13/2023 244 140 - 450 10*3/mm3 Final   03/07/2023   Corrected     Comment:     Plt clumps. Results removed for inaccuracy per  Code  Corrected result. Previous result was 85 10*3/mm3 on 3/7/2023 at 1148 EST.   02/28/2023 159 140 - 450 10*3/mm3 Final    02/12/2023 150 140 - 450 10*3/mm3 Final   02/11/2023 195 140 - 450 10*3/mm3 Final   02/10/2023 215 140 - 450 10*3/mm3 Final   02/09/2023 260 140 - 450 10*3/mm3 Final       Assessment & Plan   There are no diagnoses linked to this encounter.      Guillermo Salgado   *Squamous cell carcinoma of the gallbladder  · Incidentally found on path review from cholecystectomy.  · Due to symptomatic cholelithiasis, laparoscopic cholecystectomy 2/11/2023, Dr. 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  5 cm.  Invades perimuscular connective tissue.  Negative for lymph-vascular invasion.  Positive for focal perineural invasion.  Cystic duct margin negative.  Grade 1.  Margins were felt to be negative per pathologist.  Pathologist stage this as a [id number] cancer.  · Dr. Horn stated the gallbladder was significantly inflamed and came out in fragments so she is not sure that staging is entirely clinically accurate.  She noted she also sent the patient to Dr. Alex Delgado of hepatobiliary surgery to determine if patient needs formal lymphadenectomy/liver resection.  · Per NCCN guidelines: Needs multiphase abdomen/pelvis CT or MRI with IV contrast and CT chest with or without IV contrast.  Consider staging laparoscopy.  If felt to be resectable, then hepatic resection with lymphadenectomy with or without bile duct excision for malignant involvement.  If felt to be unresectable, then MSI/MMR testing, TMB testing, additional molecular testing to include an TRK.  Options include systemic therapy (preferred), clinical trial (preferred), palliative XRT, or best supportive care.  · Per up-to-date guidelines, for a T2 cancer found incidentally after gallbladder surgery: Reexploration and extended cholecystectomy are also indicated.  Re-exploration identifies residual tumor in 40 to 75% of cases, and a high likelihood of liver involvement and jacki metastasis with T2 disease.  Re-resection significantly  increases the likelihood of long-term DFS in patients with T2 disease.  In many series, 5-year OS increased from 25-40%, up to % with aggressive surgery.  Up-to-date authors recommend re-resection to resect at least a 2 cm margin of the liver bed and perform portal/hepatoduodenal ligament lymphadenectomy.  · Up-to-date authors recommend adjuvant therapy for all patients with completely resected T1b or higher or node positive or margin positive gallbladder cancer.  Up-to-date authors note [facility] suggests adjuvant therapy for all patients with resected gallbladder cancer.  Up-to-date authors note there is no consensus on the optimal adjuvant therapy but they recommend 6 months of postoperative chemotherapy alone with Xeloda monotherapy for most patients.  They note if Xeloda is chosen they start treatment with no more than 1500 mg total dose twice daily.  They note an alternative approaches combining concomitant 5-FU based chemoradiotherapy with 4 months of systemic chemotherapy especially with patients with margin positive disease.  They note in this situation chemotherapy can be with Xeloda monotherapy or Xeloda plus Gemzar.  They note for patients who received chemo XRT plus adjuvant chemo, there is no consensus on sequencing.  In general they state it is preferable to start with chemotherapy first, complete 3 to 4 months of systemic exposure because this will avoid XRT for patients who are destined to develop early distant disease.  · Dr. Alex Delgado, surgical oncology, states although LAD not read on MRI 2/10/2023, he sees bulky LAD on his imaging review.  He states this is currently not resectable due to the bulky LAD and requests chemotherapy in hopes this will become resectable.  He requests avoiding XRT.  · Per the Topaz 1 trial, plan Gemzar 1000 mg/m2 D1, D8 and cisplatin 25 mg/m2 D1, D8 and durvalumab 1500 mg flat dose.  Cycles every 3 weeks.  Up to 8 cycles.  This can be followed by durvalumab 1500 mg  flat dose every 4 weeks.  Durvalumab was associated with a longer PFS and a higher objective response rate (26.7% versus 18.7% without durvalumab).  The Topaz 1 trial included previously untreated unresectable locally advanced or metastatic intrahepatic or extrahepatic cholangiocarcinoma or gallbladder cancer.  Also, cisplatin and Gemzar is a common regimen in squamous cell carcinoma of a more common primary (lung).  Therefore, I feel cisplatin Gemzar durvalumab is the best regimen for this currently unresectable squamous cell carcinoma of the gallbladder.  · Dr. Delgado is recommended a prechemotherapy PET scan and then to check a PET scan again after roughly 2 months of chemotherapy to see if there has been enough improvement to see if this is resectable.  · PET 3/2/2023: Intensely active tanner hepatis nodes, and ill-defined hypodensity in the liver parenchyma adjacent to gallbladder fossa, corresponding to areas of apparent masslike invasion seen on recent MRI, felt to be concerning for malignant invasion of the liver.  A few subcentimeter hypodense lesions in the liver too small to characterize.  Could not exclude peritoneal invasion/carcinomatosis.  Suggestion of a patent active thyroid nodule.  Radiologist recommended correlation with thyroid ultrasound.  · Pretreatment CA 19-9 and CEA normal  · 3/13/2023: Begin Gemzar 1000 mg/m2 D1, D8 and cisplatin 25 mg/m2 D1, D8 and durvalumab 1500 mg flat dose.  Cycles every 3 weeks.  Up to 8 cycles.  This can be followed by durvalumab 1500 mg flat dose every 4 weeks   · (if this cannot be resected, then the Topaz 1 trial continue durvalumab until progression or toxicity.  If this can be resected, likely would not continue single agent durvalumab).  · 3/13/2023 cycle 1 day 1 Gemzar, cisplatin, durvalumab.   · 3/20/2023: Cycle 1 day 8 Gemzar and cisplatin.  Elevated LFTs with AST 86, .  Patient resumed his atorvastatin after discharge from the hospital and is not  taking tylenol.  Discussed with Dr. Benedict, we will proceed with Cisplatin and hold Gemzar.  He will return twice weekly for STAT CMP with RN review   · to monitor liver functions closely.  If worsening LFTs after holding Gemzar, may need to consider steroids.  · 3/23/2023 AST 54, .  · 3/27/2023 AST 25, ALT 57.  With an improvement in LFTs after holding Gemzar, Gemzar likely the cause.   · 4/3/2023: Cycle 2-day 1 Gemzar, cisplatin, durvalumab.  Unfortunately, LFTs further increased with , .  Total bilirubin normal at 0.9.  We will hold Gemzar today.  If further elevation in LFTs, may need to consider starting steroids.  Patient will return Thursday for a stat CMP with RN review to monitor closely.  This was all discussed with Dr. Neumann (MD #2) in Dr. Benedict's absence.  · 4/10/2023: Cycle 2-day 8 Gemzar and cisplatin.  LFTs significantly improved with AST 18, ALT 49.  Discussed with Dr. Benedict, will proceed with Gemzar today with a 25% dose reduction.  · 4/24/2023: Cycle 3-day 1 Gemzar, cisplatin, durvalumab.  LFTs now normal, discussed with Dr. Benedict and plan to continue 25% dose reduction in Gemzar.    *Suggestion of PET active thyroid nodule on PET 3/2/2023.  · PET 3/2/2023: Radiologist recommended thyroid ultrasound.    · 3/7/2023: Ordered thyroid ultrasound (but patient understands even if something looks concerning, addressed the gallbladder cancer first and only work on the thyroid nodule if gallbladder cancer treatment has completed and is felt to be in remission  · 3/8/2023: Thyroid ultrasound with right thyroid lobe measuring 5.2 x 2.1 x 1.8 cm in the left thyroid lobe measuring 3.5 x 1.6 x 1.4 cm.  There are multiple thyroid nodules including a 1.5 x 1.1 x 1.1 cm solid-appearing well marginated wider than tall nodule.  TI-RADS-3.  Consider 1 year follow-up if clinically indicated.    · Repeat thyroid ultrasound 1 year from last, approximately 3/2024.    *Not needed now but, regarding  surveillance after completing adjuvant therapy:  · Up-to-date authors state there is no agreed-upon surveillance but after completing adjuvant therapy they follow CEA and CA 19 9 every 3 to 4 months for the first 2 years after surgery, then every 6 months for 1 more year with imaging only as clinically indicated.  · NCCN guidelines state consider imaging every 3 to 6 months x 2 years, then every 6 to 12 months for up to 5 years or as clinically indicated and consider CEA and CA 19-9 as clinically indicated.    Plan:    · Proceed today with cycle 3-day 1 Gemzar and cisplatin.  Gemzar will be given on a 25% dose reduction due to elevated LFTs.   · Gemzar D1, D8 and cisplatin D1, D8 and durvalumab 1500 mg flat dose.   · Cycles every 3 weeks.  Up to 8 cycles.    · (Plan PET after roughly 2 months (after 3 cycles) to see if there is enough improvement to see if this is resectable.  · This can be followed by durvalumab 1500 mg flat dose every 4 weeks (single agent durvalumab continues until progression or toxicity if this is not resectable after chemotherapy).    · Continue to hold atorvastatin until completion of chemotherapy.  · Arranged appointments through first 3 cycles including PET scan (around 5/8/2023) after cycle 3 and appointment with Dr. Benedict and Dr. Delgado after PET scan, that same week.  Would be due for cycle 4 on 5/15/2023, so would like decisions made regarding surgery prior to that  · Port placement by Dr. Horn.  · Anticipate repeat thyroid ultrasound 1 year from last, approximately 3/2024.     The patient is on high risk medication that requires close monitoring for toxicity.  The patient was discussed with Dr. Benedict who is in agreement with today's plan of care.     Georgia Julian, TY  04/24/23

## 2023-04-21 NOTE — PROGRESS NOTES
.     REASON FOR FOLLOWUP :   Squamous cell carcinoma of the gallbladder    HISTORY OF PRESENT ILLNESS:  The patient is a 72 y.o. year old male  who is here for follow-up with the above-mentioned history.    Had a mild increase in tinnitus during this past week.  Also had some body aches which have resolved.  States [place] Julian in our office told him to take ibuprofen for the body aches.  I told him I agree (since his PLT has been doing okay) had some constipation which was treated by adding Senokot S, 2 tablets in the mornings to his already 2 tablets in the evenings    Nausea controlled with antiemetics.    Past Medical History:   Diagnosis Date   • Cancer     gallbladder   • Gilbert syndrome    • Hyperlipidemia    • Hypertension      Past Surgical History:   Procedure Laterality Date   • CHOLECYSTECTOMY WITH INTRAOPERATIVE CHOLANGIOGRAM N/A 02/11/2023    Procedure: CHOLECYSTECTOMY LAPAROSCOPIC INTRAOPERATIVE CHOLANGIOGRAM;  Surgeon: Vianey Horn MD;  Location: [facility];  Service: General;  Laterality: N/A;   • COLONOSCOPY     • PILONIDAL CYST DRAINAGE     • VENOUS ACCESS DEVICE (PORT) INSERTION N/A 3/9/2023    Procedure: INSERTION VENOUS ACCESS DEVICE;  Surgeon: Vianey Horn MD;  Location: [facility];  Service: General;  Laterality: N/A;       MEDICATIONS    Current Outpatient Medications:   •  lisinopril (PRINIVIL,ZESTRIL) 20 MG tablet, Take 1 tablet by mouth Daily., Disp: , Rfl:   •  OLANZapine (zyPREXA) 5 MG tablet, Take 1 tablet by mouth Every Night. Take on day 2, 3, and 4 and Days 9, 10, 11 after chemotherapy., Disp: 48 tablet, Rfl: 0  •  ondansetron (ZOFRAN) 8 MG tablet, Take 1 tablet by mouth 3 (Three) Times a Day As Needed for Nausea or Vomiting., Disp: 30 tablet, Rfl: 5    ALLERGIES:   No Known Allergies    SOCIAL HISTORY:       Social History     Socioeconomic History   • Marital status:      Spouse name: Oly   Tobacco Use   • Smoking status: Former     Types:  "Cigarettes   Vaping Use   • Vaping Use: Never used   Substance and Sexual Activity   • Alcohol use: Yes   • Drug use: Never   • Sexual activity: Defer         FAMILY HISTORY:  Family History   Problem Relation Age of Onset   • Malig Hyperthermia Neg Hx        REVIEW OF SYSTEMS:  Review of Systems   Constitutional: Negative for activity change.   HENT: Negative for nosebleeds and trouble swallowing.    Respiratory: Negative for shortness of breath and wheezing.    Cardiovascular: Negative for chest pain and palpitations.   Gastrointestinal: Positive for constipation. Negative for diarrhea and nausea.   Genitourinary: Negative for dysuria and hematuria.   Musculoskeletal: Negative for arthralgias and myalgias.   Neurological: Negative for seizures and syncope.   Hematological: Negative for adenopathy. Does not bruise/bleed easily.   Psychiatric/Behavioral: Negative for confusion.              Vitals:    05/01/23 0748   BP: 128/81   Pulse: 88   Resp: 16   Temp: 97.7 °F (36.5 °C)   TempSrc: Infrared   SpO2: 97%   Weight: 71.7 kg (158 lb)   Height: 173 cm (68.11\")   PainSc: 0-No pain         5/1/2023     7:53 AM   Current Status   ECOG score 0      PHYSICAL EXAM:        CONSTITUTIONAL:  Vital signs reviewed.  No distress, looks comfortable.  EYES:  Conjunctiva and lids unremarkable.  PERRLA  EARS,NOSE,MOUTH,THROAT:  Ears and nose appear unremarkable.  Lips, teeth, gums appear unremarkable.  RESPIRATORY:  Normal respiratory effort.  Lungs clear to auscultation bilaterally.  CARDIOVASCULAR:  Normal S1, S2.  No murmurs rubs or gallops.  No significant lower extremity edema.  GASTROINTESTINAL: Abdomen appears unremarkable.  Nontender.  No hepatomegaly.  No splenomegaly.  LYMPHATIC:  No cervical, supraclavicular, axillary lymphadenopathy.  SKIN:  Warm.  No rashes.  PSYCHIATRIC:  Normal judgment and insight.  Normal mood and affect.      RECENT LABS:        WBC   Date Value Ref Range Status   05/01/2023 2.04 (L) 3.40 - 10.80 " 10*3/mm3 Final   04/24/2023 4.61 3.40 - 10.80 10*3/mm3 Final   04/10/2023 7.85 3.40 - 10.80 10*3/mm3 Final   04/03/2023 6.55 3.40 - 10.80 10*3/mm3 Final   03/20/2023 4.31 3.40 - 10.80 10*3/mm3 Final   03/13/2023 7.30 3.40 - 10.80 10*3/mm3 Final   03/07/2023 8.40 3.40 - 10.80 10*3/mm3 Final   02/28/2023 8.11 3.40 - 10.80 10*3/mm3 Final   02/12/2023 8.60 3.40 - 10.80 10*3/mm3 Final   02/11/2023 6.21 3.40 - 10.80 10*3/mm3 Final   02/10/2023 5.40 3.40 - 10.80 10*3/mm3 Final   02/09/2023 6.09 3.40 - 10.80 10*3/mm3 Final     Hemoglobin   Date Value Ref Range Status   05/01/2023 10.6 (L) 13.0 - 17.7 g/dL Final   04/24/2023 11.7 (L) 13.0 - 17.7 g/dL Final   04/10/2023 12.6 (L) 13.0 - 17.7 g/dL Final   04/03/2023 12.5 (L) 13.0 - 17.7 g/dL Final   03/20/2023 12.5 (L) 13.0 - 17.7 g/dL Final   03/13/2023 13.0 13.0 - 17.7 g/dL Final   03/07/2023 13.8 13.0 - 17.7 g/dL Final   02/28/2023 13.5 13.0 - 17.7 g/dL Final   02/12/2023 11.3 (L) 13.0 - 17.7 g/dL Final   02/11/2023 12.6 (L) 13.0 - 17.7 g/dL Final   02/10/2023 13.2 13.0 - 17.7 g/dL Final   02/09/2023 13.5 13.0 - 17.7 g/dL Final     Platelets   Date Value Ref Range Status   05/01/2023 184 140 - 450 10*3/mm3 Final   04/24/2023 223 140 - 450 10*3/mm3 Final   04/10/2023 309 140 - 450 10*3/mm3 Final   04/03/2023 439 140 - 450 10*3/mm3 Final   03/20/2023 152 140 - 450 10*3/mm3 Final   03/13/2023 244 140 - 450 10*3/mm3 Final   03/07/2023   Corrected     Comment:     Plt clumps. Results removed for inaccuracy per  Code  Corrected result. Previous result was 85 10*3/mm3 on 3/7/2023 at 1148 EST.   02/28/2023 159 140 - 450 10*3/mm3 Final   02/12/2023 150 140 - 450 10*3/mm3 Final   02/11/2023 195 140 - 450 10*3/mm3 Final   02/10/2023 215 140 - 450 10*3/mm3 Final   02/09/2023 260 140 - 450 10*3/mm3 Final       Assessment & Plan   There are no diagnoses linked to this encounter.      Guillermo Salgado   *Squamous cell carcinoma of the gallbladder  · Incidentally found on path review from  cholecystectomy.  · Due to symptomatic cholelithiasis, laparoscopic cholecystectomy 2/11/2023, Dr. 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  5 cm.  Invades perimuscular connective tissue.  Negative for lymph-vascular invasion.  Positive for focal perineural invasion.  Cystic duct margin negative.  Grade 1.  Margins were felt to be negative per pathologist.  Pathologist stage this as a [id number] cancer.  · Dr. Horn stated the gallbladder was significantly inflamed and came out in fragments so she is not sure that staging is entirely clinically accurate.  She noted she also sent the patient to Dr. Alex Delgado of hepatobiliary surgery to determine if patient needs formal lymphadenectomy/liver resection.  · Per NCCN guidelines: Needs multiphase abdomen/pelvis CT or MRI with IV contrast and CT chest with or without IV contrast.  Consider staging laparoscopy.  If felt to be resectable, then hepatic resection with lymphadenectomy with or without bile duct excision for malignant involvement.  If felt to be unresectable, then MSI/MMR testing, TMB testing, additional molecular testing to include an TRK.  Options include systemic therapy (preferred), clinical trial (preferred), palliative XRT, or best supportive care.  · Per up-to-date guidelines, for a T2 cancer found incidentally after gallbladder surgery: Reexploration and extended cholecystectomy are also indicated.  Re-exploration identifies residual tumor in 40 to 75% of cases, and a high likelihood of liver involvement and jacki metastasis with T2 disease.  Re-resection significantly increases the likelihood of long-term DFS in patients with T2 disease.  In many series, 5-year OS increased from 25-40%, up to % with aggressive surgery.  Up-to-date authors recommend re-resection to resect at least a 2 cm margin of the liver bed and perform portal/hepatoduodenal ligament lymphadenectomy.  · Up-to-date authors recommend  adjuvant therapy for all patients with completely resected T1b or higher or node positive or margin positive gallbladder cancer.  Up-to-date authors note [facility] suggests adjuvant therapy for all patients with resected gallbladder cancer.  Up-to-date authors note there is no consensus on the optimal adjuvant therapy but they recommend 6 months of postoperative chemotherapy alone with Xeloda monotherapy for most patients.  They note if Xeloda is chosen they start treatment with no more than 1500 mg total dose twice daily.  They note an alternative approaches combining concomitant 5-FU based chemoradiotherapy with 4 months of systemic chemotherapy especially with patients with margin positive disease.  They note in this situation chemotherapy can be with Xeloda monotherapy or Xeloda plus Gemzar.  They note for patients who received chemo XRT plus adjuvant chemo, there is no consensus on sequencing.  In general they state it is preferable to start with chemotherapy first, complete 3 to 4 months of systemic exposure because this will avoid XRT for patients who are destined to develop early distant disease.  · Dr. Alex Delgado, surgical oncology, states although LAD not read on MRI 2/10/2023, he sees bulky LAD on his imaging review.  He states this is currently not resectable due to the bulky LAD and requests chemotherapy in hopes this will become resectable.  He requests avoiding XRT.  · Per the Topaz 1 trial, plan Gemzar 1000 mg/m2 D1, D8 and cisplatin 25 mg/m2 D1, D8 and durvalumab 1500 mg flat dose.  Cycles every 3 weeks.  Up to 8 cycles.  This can be followed by durvalumab 1500 mg flat dose every 4 weeks.  Durvalumab was associated with a longer PFS and a higher objective response rate (26.7% versus 18.7% without durvalumab).  The Topaz 1 trial included previously untreated unresectable locally advanced or metastatic intrahepatic or extrahepatic cholangiocarcinoma or gallbladder cancer.  Also, cisplatin and Gemzar is a  common regimen in squamous cell carcinoma of a more common primary (lung).  Therefore, I feel cisplatin Gemzar durvalumab is the best regimen for this currently unresectable squamous cell carcinoma of the gallbladder.  · Dr. Delgado is recommended a prechemotherapy PET scan and then to check a PET scan again after roughly 2 months of chemotherapy to see if there has been enough improvement to see if this is resectable.  · PET 3/2/2023: Intensely active tanner hepatis nodes, and ill-defined hypodensity in the liver parenchyma adjacent to gallbladder fossa, corresponding to areas of apparent masslike invasion seen on recent MRI, felt to be concerning for malignant invasion of the liver.  A few subcentimeter hypodense lesions in the liver too small to characterize.  Could not exclude peritoneal invasion/carcinomatosis.  Suggestion of a patent active thyroid nodule.  Radiologist recommended correlation with thyroid ultrasound.  · Pretreatment CA 19-9 and CEA normal  · 3/13/2023: Begin Gemzar 1000 mg/m2 D1, D8 and cisplatin 25 mg/m2 D1, D8 and durvalumab 1500 mg flat dose.  Cycles every 3 weeks.  Up to 8 cycles.  This can be followed by durvalumab 1500 mg flat dose every 4 weeks   · (if this cannot be resected, then the Topaz 1 trial continue durvalumab until progression or toxicity.  If this can be resected, likely would not continue single agent durvalumab).  · 3/20/2023, C1 D8: AST 86, .  Stop atorvastatin that was resumed at discharge.  Hold Gemzar.  Give cisplatin.  · 3/27/2023: AST 25, ALT 57.  With improvement in LFTs after holding Gemzar, thought to Gemzar likely the cause.  However:  · 4/ 3/23, C2 D1: AST up to 116, ALT up to 213.  Held Gemzar again (per DrMarcial #2).  The thought was if further elevation in LFTs may need to consider steroids for durvalumab liver toxicity.  · 4/ 10/23, C2 D8: AST 18, ALT 49.  Gemzar given at 25% dose reduction.  · 5/1/2023: C3d 8, one-time dose reduction of Gemzar by another 25%  due to .  Maintain same dose cisplatin.  Add Neulasta if insurance will allow  · (Was having mild increase in tinnitus but this is tolerable.  He understands cisplatin can significantly and permanently worsen tinnitus.  He denies hearing loss.  He understands this risk and wants to continue cisplatin at same dose.    *Suggestion of PET active thyroid nodule on PET 3/2/2023.  · PET 3/2/2023: Radiologist recommended thyroid ultrasound.    · 3/7/2023: Ordered thyroid ultrasound (but patient understands even if something looks concerning, addressed the gallbladder cancer first and only work on the thyroid nodule if gallbladder cancer treatment has completed and is felt to be in remission    *Not needed now but, regarding surveillance after completing adjuvant therapy:  · Up-to-date authors state there is no agreed-upon surveillance but after completing adjuvant therapy they follow CEA and CA 19 9 every 3 to 4 months for the first 2 years after surgery, then every 6 months for 1 more year with imaging only as clinically indicated.  · NCCN guidelines state consider imaging every 3 to 6 months x 2 years, then every 6 to 12 months for up to 5 years or as clinically indicated and consider CEA and CA 19-9 as clinically indicated.    Plan  · Gemzar D1, D8 and cisplatin D1, D8 and durvalumab 1500 mg flat dose.   · Cycles every 3 weeks.  Up to 8 cycles.    · (Plan PET after roughly 2 months (after 3 cycles) to see if there is enough improvement to see if this is resectable.  · This can be followed by durvalumab 1500 mg flat dose every 4 weeks (single agent durvalumab continues until progression or toxicity if this is not resectable after chemotherapy).    · Arranged appointments through first 3 cycles including PET scan (around 5/8/2023) after cycle 3 and appointment with me and Dr. Delgado after PET scan, that same week.  Would be due for cycle 4 on 5/15/2023, so would like decisions made regarding surgery prior to  that  · has a port    41 minutes.  Total time.  Same day.

## 2023-04-24 ENCOUNTER — INFUSION (OUTPATIENT)
Dept: ONCOLOGY | Facility: HOSPITAL | Age: 73
End: 2023-04-24
Payer: MEDICARE

## 2023-04-24 ENCOUNTER — OFFICE VISIT (OUTPATIENT)
Dept: ONCOLOGY | Facility: CLINIC | Age: 73
End: 2023-04-24
Payer: MEDICARE

## 2023-04-24 VITALS
RESPIRATION RATE: 18 BRPM | DIASTOLIC BLOOD PRESSURE: 85 MMHG | HEART RATE: 102 BPM | TEMPERATURE: 98.4 F | BODY MASS INDEX: 24.04 KG/M2 | OXYGEN SATURATION: 99 % | HEIGHT: 68 IN | SYSTOLIC BLOOD PRESSURE: 145 MMHG | WEIGHT: 158.6 LBS

## 2023-04-24 VITALS — BODY MASS INDEX: 24.64 KG/M2 | WEIGHT: 162.6 LBS

## 2023-04-24 DIAGNOSIS — C23 GALLBLADDER CANCER: Primary | ICD-10-CM

## 2023-04-24 DIAGNOSIS — Z79.899 HIGH RISK MEDICATION USE: ICD-10-CM

## 2023-04-24 DIAGNOSIS — Z45.9 ENCOUNTER FOR MANAGEMENT OF IMPLANTED DEVICE: ICD-10-CM

## 2023-04-24 DIAGNOSIS — R79.89 ELEVATED LFTS: ICD-10-CM

## 2023-04-24 LAB
ALBUMIN SERPL-MCNC: 4 G/DL (ref 3.5–5.2)
ALBUMIN/GLOB SERPL: 1.1 G/DL (ref 1.1–2.4)
ALP SERPL-CCNC: 89 U/L (ref 38–116)
ALT SERPL W P-5'-P-CCNC: 12 U/L (ref 0–41)
ANION GAP SERPL CALCULATED.3IONS-SCNC: 12.2 MMOL/L (ref 5–15)
AST SERPL-CCNC: 17 U/L (ref 0–40)
BASOPHILS # BLD AUTO: 0.03 10*3/MM3 (ref 0–0.2)
BASOPHILS NFR BLD AUTO: 0.7 % (ref 0–1.5)
BILIRUB SERPL-MCNC: 0.6 MG/DL (ref 0.2–1.2)
BUN SERPL-MCNC: 13 MG/DL (ref 6–20)
BUN/CREAT SERPL: 15.5 (ref 7.3–30)
CALCIUM SPEC-SCNC: 9.5 MG/DL (ref 8.5–10.2)
CHLORIDE SERPL-SCNC: 102 MMOL/L (ref 98–107)
CO2 SERPL-SCNC: 23.8 MMOL/L (ref 22–29)
CREAT SERPL-MCNC: 0.84 MG/DL (ref 0.7–1.3)
DEPRECATED RDW RBC AUTO: 43.2 FL (ref 37–54)
EGFRCR SERPLBLD CKD-EPI 2021: 91.5 ML/MIN/1.73
EOSINOPHIL # BLD AUTO: 0.07 10*3/MM3 (ref 0–0.4)
EOSINOPHIL NFR BLD AUTO: 1.5 % (ref 0.3–6.2)
ERYTHROCYTE [DISTWIDTH] IN BLOOD BY AUTOMATED COUNT: 13.5 % (ref 12.3–15.4)
GLOBULIN UR ELPH-MCNC: 3.5 GM/DL (ref 1.8–3.5)
GLUCOSE SERPL-MCNC: 151 MG/DL (ref 74–124)
HCT VFR BLD AUTO: 35.7 % (ref 37.5–51)
HGB BLD-MCNC: 11.7 G/DL (ref 13–17.7)
IMM GRANULOCYTES # BLD AUTO: 0.01 10*3/MM3 (ref 0–0.05)
IMM GRANULOCYTES NFR BLD AUTO: 0.2 % (ref 0–0.5)
LYMPHOCYTES # BLD AUTO: 1.02 10*3/MM3 (ref 0.7–3.1)
LYMPHOCYTES NFR BLD AUTO: 22.1 % (ref 19.6–45.3)
MAGNESIUM SERPL-MCNC: 2 MG/DL (ref 1.8–2.5)
MCH RBC QN AUTO: 30.5 PG (ref 26.6–33)
MCHC RBC AUTO-ENTMCNC: 32.8 G/DL (ref 31.5–35.7)
MCV RBC AUTO: 93 FL (ref 79–97)
MONOCYTES # BLD AUTO: 0.48 10*3/MM3 (ref 0.1–0.9)
MONOCYTES NFR BLD AUTO: 10.4 % (ref 5–12)
NEUTROPHILS NFR BLD AUTO: 3 10*3/MM3 (ref 1.7–7)
NEUTROPHILS NFR BLD AUTO: 65.1 % (ref 42.7–76)
NRBC BLD AUTO-RTO: 0 /100 WBC (ref 0–0.2)
PLATELET # BLD AUTO: 223 10*3/MM3 (ref 140–450)
PMV BLD AUTO: 8.5 FL (ref 6–12)
POTASSIUM SERPL-SCNC: 4.1 MMOL/L (ref 3.5–4.7)
PROT SERPL-MCNC: 7.5 G/DL (ref 6.3–8)
RBC # BLD AUTO: 3.84 10*6/MM3 (ref 4.14–5.8)
SODIUM SERPL-SCNC: 138 MMOL/L (ref 134–145)
T4 FREE SERPL-MCNC: 1.34 NG/DL (ref 0.93–1.7)
TSH SERPL DL<=0.05 MIU/L-ACNC: 3.34 UIU/ML (ref 0.27–4.2)
WBC NRBC COR # BLD: 4.61 10*3/MM3 (ref 3.4–10.8)

## 2023-04-24 PROCEDURE — A9270 NON-COVERED ITEM OR SERVICE: HCPCS | Performed by: NURSE PRACTITIONER

## 2023-04-24 PROCEDURE — 25010000002 PALONOSETRON PER 25 MCG: Performed by: NURSE PRACTITIONER

## 2023-04-24 PROCEDURE — 96375 TX/PRO/DX INJ NEW DRUG ADDON: CPT

## 2023-04-24 PROCEDURE — 25010000002 HEPARIN LOCK FLUSH PER 10 UNITS: Performed by: INTERNAL MEDICINE

## 2023-04-24 PROCEDURE — 25010000002 GEMCITABINE HCL 2 GM/20ML SOLUTION 20 ML VIAL: Performed by: NURSE PRACTITIONER

## 2023-04-24 PROCEDURE — 96413 CHEMO IV INFUSION 1 HR: CPT

## 2023-04-24 PROCEDURE — 63710000001 OLANZAPINE 5 MG TABLET: Performed by: NURSE PRACTITIONER

## 2023-04-24 PROCEDURE — 63710000001 POTASSIUM CHLORIDE 20 MEQ TABLET CONTROLLED-RELEASE: Performed by: NURSE PRACTITIONER

## 2023-04-24 PROCEDURE — 83735 ASSAY OF MAGNESIUM: CPT

## 2023-04-24 PROCEDURE — 96417 CHEMO IV INFUS EACH ADDL SEQ: CPT

## 2023-04-24 PROCEDURE — 25010000002 MAGNESIUM SULFATE PER 500 MG OF MAGNESIUM: Performed by: NURSE PRACTITIONER

## 2023-04-24 PROCEDURE — 25010000002 FOSAPREPITANT PER 1 MG: Performed by: NURSE PRACTITIONER

## 2023-04-24 PROCEDURE — 80053 COMPREHEN METABOLIC PANEL: CPT

## 2023-04-24 PROCEDURE — 25010000002 DEXAMETHASONE SODIUM PHOSPHATE 100 MG/10ML SOLUTION: Performed by: NURSE PRACTITIONER

## 2023-04-24 PROCEDURE — 84439 ASSAY OF FREE THYROXINE: CPT | Performed by: INTERNAL MEDICINE

## 2023-04-24 PROCEDURE — 25010000002 CISPLATIN PER 50 MG: Performed by: NURSE PRACTITIONER

## 2023-04-24 PROCEDURE — 96367 TX/PROPH/DG ADDL SEQ IV INF: CPT

## 2023-04-24 PROCEDURE — 25010000002 DURVALUMAB 50 MG/ML SOLUTION 10 ML VIAL: Performed by: NURSE PRACTITIONER

## 2023-04-24 PROCEDURE — 84443 ASSAY THYROID STIM HORMONE: CPT | Performed by: INTERNAL MEDICINE

## 2023-04-24 PROCEDURE — 85025 COMPLETE CBC W/AUTO DIFF WBC: CPT

## 2023-04-24 RX ORDER — POTASSIUM CHLORIDE 20 MEQ/1
20 TABLET, EXTENDED RELEASE ORAL ONCE
Status: COMPLETED | OUTPATIENT
Start: 2023-04-24 | End: 2023-04-24

## 2023-04-24 RX ORDER — OLANZAPINE 5 MG/1
5 TABLET ORAL ONCE
Status: CANCELLED | OUTPATIENT
Start: 2023-04-24

## 2023-04-24 RX ORDER — PALONOSETRON 0.05 MG/ML
0.25 INJECTION, SOLUTION INTRAVENOUS ONCE
Status: COMPLETED | OUTPATIENT
Start: 2023-04-24 | End: 2023-04-24

## 2023-04-24 RX ORDER — SODIUM CHLORIDE 0.9 % (FLUSH) 0.9 %
10 SYRINGE (ML) INJECTION AS NEEDED
Status: CANCELLED | OUTPATIENT
Start: 2023-04-24

## 2023-04-24 RX ORDER — SODIUM CHLORIDE 9 MG/ML
250 INJECTION, SOLUTION INTRAVENOUS ONCE
Status: COMPLETED | OUTPATIENT
Start: 2023-04-24 | End: 2023-04-24

## 2023-04-24 RX ORDER — PALONOSETRON 0.05 MG/ML
0.25 INJECTION, SOLUTION INTRAVENOUS ONCE
Status: CANCELLED | OUTPATIENT
Start: 2023-04-24

## 2023-04-24 RX ORDER — HEPARIN SODIUM (PORCINE) LOCK FLUSH IV SOLN 100 UNIT/ML 100 UNIT/ML
500 SOLUTION INTRAVENOUS AS NEEDED
Status: CANCELLED | OUTPATIENT
Start: 2023-04-24

## 2023-04-24 RX ORDER — POTASSIUM CHLORIDE 20 MEQ/1
20 TABLET, EXTENDED RELEASE ORAL ONCE
Status: CANCELLED
Start: 2023-04-24

## 2023-04-24 RX ORDER — SODIUM CHLORIDE 0.9 % (FLUSH) 0.9 %
10 SYRINGE (ML) INJECTION AS NEEDED
Status: DISCONTINUED | OUTPATIENT
Start: 2023-04-24 | End: 2023-04-24 | Stop reason: HOSPADM

## 2023-04-24 RX ORDER — OLANZAPINE 5 MG/1
5 TABLET ORAL ONCE
Status: COMPLETED | OUTPATIENT
Start: 2023-04-24 | End: 2023-04-24

## 2023-04-24 RX ORDER — HEPARIN SODIUM (PORCINE) LOCK FLUSH IV SOLN 100 UNIT/ML 100 UNIT/ML
500 SOLUTION INTRAVENOUS AS NEEDED
Status: DISCONTINUED | OUTPATIENT
Start: 2023-04-24 | End: 2023-04-24 | Stop reason: HOSPADM

## 2023-04-24 RX ADMIN — DEXAMETHASONE SODIUM PHOSPHATE 12 MG: 10 INJECTION, SOLUTION INTRAMUSCULAR; INTRAVENOUS at 11:22

## 2023-04-24 RX ADMIN — Medication 500 UNITS: at 13:56

## 2023-04-24 RX ADMIN — SODIUM CHLORIDE 250 ML: 9 INJECTION, SOLUTION INTRAVENOUS at 08:50

## 2023-04-24 RX ADMIN — CISPLATIN 46 MG: 1 INJECTION, SOLUTION INTRAVENOUS at 12:20

## 2023-04-24 RX ADMIN — PALONOSETRON 0.25 MG: 0.05 INJECTION, SOLUTION INTRAVENOUS at 11:20

## 2023-04-24 RX ADMIN — FOSAPREPITANT DIMEGLUMINE 100 ML: 150 INJECTION, POWDER, LYOPHILIZED, FOR SOLUTION INTRAVENOUS at 09:40

## 2023-04-24 RX ADMIN — POTASSIUM CHLORIDE 20 MEQ: 20 TABLET, EXTENDED RELEASE ORAL at 11:21

## 2023-04-24 RX ADMIN — MAGNESIUM SULFATE HEPTAHYDRATE 500 ML/HR: 500 INJECTION, SOLUTION INTRAMUSCULAR; INTRAVENOUS at 13:23

## 2023-04-24 RX ADMIN — Medication 10 ML: at 13:56

## 2023-04-24 RX ADMIN — GEMCITABINE 1400 MG: 100 INJECTION, SOLUTION INTRAVENOUS at 11:40

## 2023-04-24 RX ADMIN — MAGNESIUM SULFATE HEPTAHYDRATE 500 ML/HR: 500 INJECTION, SOLUTION INTRAMUSCULAR; INTRAVENOUS at 08:50

## 2023-04-24 RX ADMIN — OLANZAPINE 5 MG: 5 TABLET, FILM COATED ORAL at 09:39

## 2023-04-24 RX ADMIN — SODIUM CHLORIDE 1500 MG: 900 INJECTION, SOLUTION INTRAVENOUS at 10:13

## 2023-05-01 ENCOUNTER — OFFICE VISIT (OUTPATIENT)
Dept: ONCOLOGY | Facility: CLINIC | Age: 73
End: 2023-05-01
Payer: MEDICARE

## 2023-05-01 ENCOUNTER — INFUSION (OUTPATIENT)
Dept: ONCOLOGY | Facility: HOSPITAL | Age: 73
End: 2023-05-01
Payer: MEDICARE

## 2023-05-01 VITALS
SYSTOLIC BLOOD PRESSURE: 128 MMHG | DIASTOLIC BLOOD PRESSURE: 81 MMHG | BODY MASS INDEX: 23.95 KG/M2 | HEIGHT: 68 IN | HEART RATE: 88 BPM | TEMPERATURE: 97.7 F | WEIGHT: 158 LBS | RESPIRATION RATE: 16 BRPM | OXYGEN SATURATION: 97 %

## 2023-05-01 DIAGNOSIS — C23 GALLBLADDER CANCER: Primary | ICD-10-CM

## 2023-05-01 DIAGNOSIS — Z45.9 ENCOUNTER FOR MANAGEMENT OF IMPLANTED DEVICE: ICD-10-CM

## 2023-05-01 LAB
ALBUMIN SERPL-MCNC: 4 G/DL (ref 3.5–5.2)
ALBUMIN/GLOB SERPL: 1.3 G/DL (ref 1.1–2.4)
ALP SERPL-CCNC: 77 U/L (ref 38–116)
ALT SERPL W P-5'-P-CCNC: 17 U/L (ref 0–41)
ANION GAP SERPL CALCULATED.3IONS-SCNC: 10.1 MMOL/L (ref 5–15)
AST SERPL-CCNC: 14 U/L (ref 0–40)
BASOPHILS # BLD AUTO: 0.03 10*3/MM3 (ref 0–0.2)
BASOPHILS NFR BLD AUTO: 1.5 % (ref 0–1.5)
BILIRUB SERPL-MCNC: 0.3 MG/DL (ref 0.2–1.2)
BUN SERPL-MCNC: 15 MG/DL (ref 6–20)
BUN/CREAT SERPL: 17.6 (ref 7.3–30)
CALCIUM SPEC-SCNC: 9.4 MG/DL (ref 8.5–10.2)
CHLORIDE SERPL-SCNC: 101 MMOL/L (ref 98–107)
CO2 SERPL-SCNC: 23.9 MMOL/L (ref 22–29)
CREAT SERPL-MCNC: 0.85 MG/DL (ref 0.7–1.3)
DEPRECATED RDW RBC AUTO: 43.4 FL (ref 37–54)
EGFRCR SERPLBLD CKD-EPI 2021: 91.2 ML/MIN/1.73
EOSINOPHIL # BLD AUTO: 0.01 10*3/MM3 (ref 0–0.4)
EOSINOPHIL NFR BLD AUTO: 0.5 % (ref 0.3–6.2)
ERYTHROCYTE [DISTWIDTH] IN BLOOD BY AUTOMATED COUNT: 13.4 % (ref 12.3–15.4)
GLOBULIN UR ELPH-MCNC: 3.1 GM/DL (ref 1.8–3.5)
GLUCOSE SERPL-MCNC: 102 MG/DL (ref 74–124)
HCT VFR BLD AUTO: 31.6 % (ref 37.5–51)
HGB BLD-MCNC: 10.6 G/DL (ref 13–17.7)
IMM GRANULOCYTES # BLD AUTO: 0 10*3/MM3 (ref 0–0.05)
IMM GRANULOCYTES NFR BLD AUTO: 0 % (ref 0–0.5)
LYMPHOCYTES # BLD AUTO: 1.06 10*3/MM3 (ref 0.7–3.1)
LYMPHOCYTES NFR BLD AUTO: 52 % (ref 19.6–45.3)
MAGNESIUM SERPL-MCNC: 1.8 MG/DL (ref 1.8–2.5)
MCH RBC QN AUTO: 30.9 PG (ref 26.6–33)
MCHC RBC AUTO-ENTMCNC: 33.5 G/DL (ref 31.5–35.7)
MCV RBC AUTO: 92.1 FL (ref 79–97)
MONOCYTES # BLD AUTO: 0.17 10*3/MM3 (ref 0.1–0.9)
MONOCYTES NFR BLD AUTO: 8.3 % (ref 5–12)
NEUTROPHILS NFR BLD AUTO: 0.77 10*3/MM3 (ref 1.7–7)
NEUTROPHILS NFR BLD AUTO: 37.7 % (ref 42.7–76)
NRBC BLD AUTO-RTO: 0 /100 WBC (ref 0–0.2)
PLATELET # BLD AUTO: 184 10*3/MM3 (ref 140–450)
PMV BLD AUTO: 8.2 FL (ref 6–12)
POTASSIUM SERPL-SCNC: 4.5 MMOL/L (ref 3.5–4.7)
PROT SERPL-MCNC: 7.1 G/DL (ref 6.3–8)
RBC # BLD AUTO: 3.43 10*6/MM3 (ref 4.14–5.8)
SODIUM SERPL-SCNC: 135 MMOL/L (ref 134–145)
WBC NRBC COR # BLD: 2.04 10*3/MM3 (ref 3.4–10.8)

## 2023-05-01 PROCEDURE — 85025 COMPLETE CBC W/AUTO DIFF WBC: CPT

## 2023-05-01 PROCEDURE — 96367 TX/PROPH/DG ADDL SEQ IV INF: CPT

## 2023-05-01 PROCEDURE — 25010000002 GEMCITABINE HCL 2 GM/20ML SOLUTION 20 ML VIAL: Performed by: INTERNAL MEDICINE

## 2023-05-01 PROCEDURE — 25010000002 MAGNESIUM SULFATE PER 500 MG OF MAGNESIUM: Performed by: INTERNAL MEDICINE

## 2023-05-01 PROCEDURE — 96366 THER/PROPH/DIAG IV INF ADDON: CPT

## 2023-05-01 PROCEDURE — 96375 TX/PRO/DX INJ NEW DRUG ADDON: CPT

## 2023-05-01 PROCEDURE — 25010000002 CISPLATIN PER 50 MG: Performed by: INTERNAL MEDICINE

## 2023-05-01 PROCEDURE — 96413 CHEMO IV INFUSION 1 HR: CPT

## 2023-05-01 PROCEDURE — 63710000001 POTASSIUM CHLORIDE 20 MEQ TABLET CONTROLLED-RELEASE: Performed by: INTERNAL MEDICINE

## 2023-05-01 PROCEDURE — 80053 COMPREHEN METABOLIC PANEL: CPT

## 2023-05-01 PROCEDURE — A9270 NON-COVERED ITEM OR SERVICE: HCPCS | Performed by: INTERNAL MEDICINE

## 2023-05-01 PROCEDURE — 96377 APPLICATON ON-BODY INJECTOR: CPT

## 2023-05-01 PROCEDURE — 25010000002 PEGFILGRASTIM 6 MG/0.6ML PREFILLED SYRINGE KIT: Performed by: INTERNAL MEDICINE

## 2023-05-01 PROCEDURE — 25010000002 DEXAMETHASONE SODIUM PHOSPHATE 100 MG/10ML SOLUTION: Performed by: INTERNAL MEDICINE

## 2023-05-01 PROCEDURE — 96417 CHEMO IV INFUS EACH ADDL SEQ: CPT

## 2023-05-01 PROCEDURE — 25010000002 HEPARIN LOCK FLUSH PER 10 UNITS: Performed by: INTERNAL MEDICINE

## 2023-05-01 PROCEDURE — 25010000002 PALONOSETRON PER 25 MCG: Performed by: INTERNAL MEDICINE

## 2023-05-01 PROCEDURE — 83735 ASSAY OF MAGNESIUM: CPT

## 2023-05-01 PROCEDURE — 25010000002 FOSAPREPITANT PER 1 MG: Performed by: INTERNAL MEDICINE

## 2023-05-01 PROCEDURE — 96361 HYDRATE IV INFUSION ADD-ON: CPT

## 2023-05-01 RX ORDER — POTASSIUM CHLORIDE 20 MEQ/1
20 TABLET, EXTENDED RELEASE ORAL ONCE
Status: CANCELLED
Start: 2023-05-01

## 2023-05-01 RX ORDER — PALONOSETRON 0.05 MG/ML
0.25 INJECTION, SOLUTION INTRAVENOUS ONCE
Status: CANCELLED | OUTPATIENT
Start: 2023-05-01

## 2023-05-01 RX ORDER — HEPARIN SODIUM (PORCINE) LOCK FLUSH IV SOLN 100 UNIT/ML 100 UNIT/ML
500 SOLUTION INTRAVENOUS AS NEEDED
OUTPATIENT
Start: 2023-05-01

## 2023-05-01 RX ORDER — OLANZAPINE 5 MG/1
5 TABLET ORAL ONCE
Status: DISCONTINUED | OUTPATIENT
Start: 2023-05-01 | End: 2023-05-01 | Stop reason: HOSPADM

## 2023-05-01 RX ORDER — SODIUM CHLORIDE 0.9 % (FLUSH) 0.9 %
10 SYRINGE (ML) INJECTION AS NEEDED
Status: DISCONTINUED | OUTPATIENT
Start: 2023-05-01 | End: 2023-05-01 | Stop reason: HOSPADM

## 2023-05-01 RX ORDER — PALONOSETRON 0.05 MG/ML
0.25 INJECTION, SOLUTION INTRAVENOUS ONCE
Status: COMPLETED | OUTPATIENT
Start: 2023-05-01 | End: 2023-05-01

## 2023-05-01 RX ORDER — SODIUM CHLORIDE 0.9 % (FLUSH) 0.9 %
10 SYRINGE (ML) INJECTION AS NEEDED
OUTPATIENT
Start: 2023-05-01

## 2023-05-01 RX ORDER — OLANZAPINE 5 MG/1
5 TABLET ORAL ONCE
Status: CANCELLED | OUTPATIENT
Start: 2023-05-01

## 2023-05-01 RX ORDER — SODIUM CHLORIDE 9 MG/ML
250 INJECTION, SOLUTION INTRAVENOUS ONCE
Status: COMPLETED | OUTPATIENT
Start: 2023-05-01 | End: 2023-05-01

## 2023-05-01 RX ORDER — POTASSIUM CHLORIDE 20 MEQ/1
20 TABLET, EXTENDED RELEASE ORAL ONCE
Status: COMPLETED | OUTPATIENT
Start: 2023-05-01 | End: 2023-05-01

## 2023-05-01 RX ORDER — SODIUM CHLORIDE 9 MG/ML
250 INJECTION, SOLUTION INTRAVENOUS ONCE
Status: CANCELLED | OUTPATIENT
Start: 2023-05-01

## 2023-05-01 RX ORDER — HEPARIN SODIUM (PORCINE) LOCK FLUSH IV SOLN 100 UNIT/ML 100 UNIT/ML
500 SOLUTION INTRAVENOUS AS NEEDED
Status: DISCONTINUED | OUTPATIENT
Start: 2023-05-01 | End: 2023-05-01 | Stop reason: HOSPADM

## 2023-05-01 RX ADMIN — DEXAMETHASONE SODIUM PHOSPHATE 12 MG: 10 INJECTION, SOLUTION INTRAMUSCULAR; INTRAVENOUS at 10:43

## 2023-05-01 RX ADMIN — GEMCITABINE 1050 MG: 100 INJECTION, SOLUTION INTRAVENOUS at 11:04

## 2023-05-01 RX ADMIN — Medication 500 UNITS: at 14:03

## 2023-05-01 RX ADMIN — PEGFILGRASTIM 6 MG: KIT SUBCUTANEOUS at 14:13

## 2023-05-01 RX ADMIN — PALONOSETRON 0.25 MG: 0.05 INJECTION, SOLUTION INTRAVENOUS at 10:08

## 2023-05-01 RX ADMIN — SODIUM CHLORIDE 250 ML: 9 INJECTION, SOLUTION INTRAVENOUS at 08:53

## 2023-05-01 RX ADMIN — Medication 10 ML: at 14:03

## 2023-05-01 RX ADMIN — CISPLATIN 46 MG: 1 INJECTION, SOLUTION INTRAVENOUS at 11:36

## 2023-05-01 RX ADMIN — MAGNESIUM SULFATE HEPTAHYDRATE 500 ML/HR: 500 INJECTION, SOLUTION INTRAMUSCULAR; INTRAVENOUS at 12:38

## 2023-05-01 RX ADMIN — MAGNESIUM SULFATE HEPTAHYDRATE 500 ML/HR: 500 INJECTION, SOLUTION INTRAMUSCULAR; INTRAVENOUS at 08:53

## 2023-05-01 RX ADMIN — POTASSIUM CHLORIDE 20 MEQ: 20 TABLET, EXTENDED RELEASE ORAL at 12:38

## 2023-05-01 RX ADMIN — SODIUM CHLORIDE 100 ML: 9 INJECTION, SOLUTION INTRAVENOUS at 10:10

## 2023-05-01 NOTE — NURSING NOTE
Pt to infusion room for chemo treatment   Lab Results   Component Value Date    WBC 2.04 (L) 05/01/2023    HGB 10.6 (L) 05/01/2023    HCT 31.6 (L) 05/01/2023    MCV 92.1 05/01/2023     05/01/2023     Per Dr Jak yeung to treat patient today with ANC of .77  Pt will receive neulasta on body after treatment today ..  Reviewed neulasta indications , potential side effects  Pt and wife watched video on neulasta and V/U .   Advised pt and wife to call for any questions or concerns    Pre Chemo  lbs. B/P 128/81  Post .6 ; B/P 125/83  Pt has voided lg amt urine X 4  Pt has no c/o's .  Discharged home stable    Pt refused zyprexa this morning , states it makes him too sleepy and he takes at home after treatment

## 2023-05-02 ENCOUNTER — TELEPHONE (OUTPATIENT)
Dept: ONCOLOGY | Facility: CLINIC | Age: 73
End: 2023-05-02
Payer: MEDICARE

## 2023-05-02 NOTE — TELEPHONE ENCOUNTER
Phoned Mr. Salgado and he has appt with Dr. Delgado on 05/12 in th morning prior to his appt with Dr. Benedict.

## 2023-05-03 ENCOUNTER — TELEPHONE (OUTPATIENT)
Dept: ONCOLOGY | Facility: CLINIC | Age: 73
End: 2023-05-03
Payer: MEDICARE

## 2023-05-03 NOTE — TELEPHONE ENCOUNTER
Returned call to patient with the laxative Protocol, he has not had a BM in several days and he has been taking Senekot S 2 tablets twice a day without relief.  He needed to know what he can do for relief.  Advised Milk of Magnesia.  He does not have any there, but he has Miralax.  Advised him to use that twice a day with the Senekot.  He will increase his po fluids and he will eat some prunes today.  If he does not have a BM by tomorrow, he will call the office back.

## 2023-05-06 ENCOUNTER — TELEPHONE (OUTPATIENT)
Dept: ONCOLOGY | Facility: CLINIC | Age: 73
End: 2023-05-06
Payer: MEDICARE

## 2023-05-06 NOTE — TELEPHONE ENCOUNTER
On-call note: Patient called in reporting dizziness this morning when awaking around 5 AM.  It did make him feel nauseous.  He took an ondansetron with improvement.  He had another episode.  He states it could have been related to position changes.  He did take his blood pressure at the time and systolics were in the double digits he thinks somewhere between 90 and 92.  When on the phone he had just taken his blood pressure which was 118 systolic.  His heart rate was 102 and regular he reports patient denies any other symptoms and states has been feeling well overall.  Has had only minor by bone pain with Neulasta injection which was new this past week.  He continues to drink plenty of fluids and I encouraged to keep that up along with electrolyte drinks.  He has had some allergy symptoms from mowing which she did last night though he did wear a mask.  He uses nasal saline.  We discussed he can use Flonase or Nasacort as well.  He has no symptoms of dizziness at this time we will continue to monitor this and call us with any other issues.

## 2023-05-08 ENCOUNTER — TELEPHONE (OUTPATIENT)
Dept: ONCOLOGY | Facility: HOSPITAL | Age: 73
End: 2023-05-08
Payer: MEDICARE

## 2023-05-08 ENCOUNTER — TELEPHONE (OUTPATIENT)
Dept: ONCOLOGY | Facility: CLINIC | Age: 73
End: 2023-05-08
Payer: MEDICARE

## 2023-05-08 ENCOUNTER — HOSPITAL ENCOUNTER (OUTPATIENT)
Dept: PET IMAGING | Facility: HOSPITAL | Age: 73
Discharge: HOME | End: 2023-05-08
Payer: MEDICARE

## 2023-05-08 ENCOUNTER — HOSPITAL ENCOUNTER (OUTPATIENT)
Dept: PET IMAGING | Facility: HOSPITAL | Age: 73
Discharge: HOME | End: 2023-05-08
Admitting: INTERNAL MEDICINE
Payer: MEDICARE

## 2023-05-08 DIAGNOSIS — C23 GALLBLADDER CANCER: ICD-10-CM

## 2023-05-08 LAB — GLUCOSE BLDC GLUCOMTR-MCNC: 93 MG/DL (ref 70–130)

## 2023-05-08 PROCEDURE — 82948 REAGENT STRIP/BLOOD GLUCOSE: CPT

## 2023-05-08 PROCEDURE — 0 FLUDEOXYGLUCOSE F18 SOLUTION: Performed by: INTERNAL MEDICINE

## 2023-05-08 PROCEDURE — A9552 F18 FDG: HCPCS | Performed by: INTERNAL MEDICINE

## 2023-05-08 PROCEDURE — 78815 PET IMAGE W/CT SKULL-THIGH: CPT

## 2023-05-08 RX ADMIN — FLUDEOXYGLUCOSE F18 1 DOSE: 300 INJECTION INTRAVENOUS at 10:30

## 2023-05-08 NOTE — TELEPHONE ENCOUNTER
Pt states he is having some nausea and wanted to make sure it is okay if he takes a zofran prior to his scan today. Advised that he may take it with some water. He v/u.

## 2023-05-08 NOTE — TELEPHONE ENCOUNTER
Please call the patient ASAP.  He had some problems over the weekend and needs to talk to someone before his PET Scan this morning

## 2023-05-08 NOTE — TELEPHONE ENCOUNTER
Returned call to Marcial Damian, he asked if he could take a zofran this am before his PET scan and I advised him he could as long as he drank it with plain water. He v/u.

## 2023-05-09 NOTE — PROGRESS NOTES
.     REASON FOR FOLLOWUP :   Squamous cell carcinoma of the gallbladder    HISTORY OF PRESENT ILLNESS:  The patient is a 72 y.o. year old male  who is here for follow-up with the above-mentioned history.    This morning he saw Dr. Alex Delgado who reviewed his PET scan with him.  Dr. Delgado explained PET scan is consistent with progression and surgery is no longer a possibility.  Patient is understandably disappointed in this news.  He states the last 2 cycles were difficult because of fatigue.    States he had some mild numbness and tingling bilateral toes on the third day of treatment for the last few cycles, did not worsen.  No issues otherwise.    Significant constipation    Past Medical History:   Diagnosis Date   • Cancer     gallbladder   • Gilbert syndrome    • Hyperlipidemia    • Hypertension      Past Surgical History:   Procedure Laterality Date   • CHOLECYSTECTOMY WITH INTRAOPERATIVE CHOLANGIOGRAM N/A 02/11/2023    Procedure: CHOLECYSTECTOMY LAPAROSCOPIC INTRAOPERATIVE CHOLANGIOGRAM;  Surgeon: Vianey Horn MD;  Location: [facility] OR;  Service: General;  Laterality: N/A;   • COLONOSCOPY     • PILONIDAL CYST DRAINAGE     • VENOUS ACCESS DEVICE (PORT) INSERTION N/A 3/9/2023    Procedure: INSERTION VENOUS ACCESS DEVICE;  Surgeon: Vianey Horn MD;  Location: [facility] OR [facility];  Service: General;  Laterality: N/A;       MEDICATIONS    Current Outpatient Medications:   •  lisinopril (PRINIVIL,ZESTRIL) 20 MG tablet, Take 1 tablet by mouth Daily., Disp: , Rfl:   •  OLANZapine (zyPREXA) 5 MG tablet, Take 1 tablet by mouth Every Night. Take on day 2, 3, and 4 and Days 9, 10, 11 after chemotherapy., Disp: 48 tablet, Rfl: 0  •  ondansetron (ZOFRAN) 8 MG tablet, Take 1 tablet by mouth 3 (Three) Times a Day As Needed for Nausea or Vomiting., Disp: 30 tablet, Rfl: 5    ALLERGIES:   No Known Allergies    SOCIAL HISTORY:       Social History     Socioeconomic History   • Marital status:      Spouse name:  "Oly   Tobacco Use   • Smoking status: Former     Types: Cigarettes   Vaping Use   • Vaping Use: Never used   Substance and Sexual Activity   • Alcohol use: Yes   • Drug use: Never   • Sexual activity: Defer         FAMILY HISTORY:  Family History   Problem Relation Age of Onset   • Malig Hyperthermia Neg Hx        REVIEW OF SYSTEMS:  Review of Systems   Constitutional: Negative for activity change.   HENT: Negative for nosebleeds and trouble swallowing.    Respiratory: Negative for shortness of breath and wheezing.    Cardiovascular: Negative for chest pain and palpitations.   Gastrointestinal: Positive for constipation. Negative for diarrhea and nausea.   Genitourinary: Negative for dysuria and hematuria.   Musculoskeletal: Negative for arthralgias and myalgias.   Neurological: Negative for seizures and syncope.   Hematological: Negative for adenopathy. Does not bruise/bleed easily.   Psychiatric/Behavioral: Negative for confusion.              Vitals:    05/12/23 1146   BP: 127/84   Pulse: 101   Resp: 18   Temp: 97.8 °F (36.6 °C)   TempSrc: Temporal   SpO2: 96%   Weight: 73.5 kg (162 lb)   Height: 173 cm (68.11\")   PainSc: 0-No pain         5/12/2023    11:47 AM   Current Status   ECOG score 0      PHYSICAL EXAM:        CONSTITUTIONAL:  Vital signs reviewed.  No distress, looks comfortable.  EYES:  Conjunctiva and lids unremarkable.  PERRLA  EARS,NOSE,MOUTH,THROAT:  Ears and nose appear unremarkable.  Lips, teeth, gums appear unremarkable.  RESPIRATORY:  Normal respiratory effort.  Lungs clear to auscultation bilaterally.  CARDIOVASCULAR:  Normal S1, S2.  No murmurs rubs or gallops.  No significant lower extremity edema.  GASTROINTESTINAL: Abdomen appears unremarkable.  Nontender.  No hepatomegaly.  No splenomegaly.  LYMPHATIC:  No cervical, supraclavicular, axillary lymphadenopathy.  SKIN:  Warm.  No rashes.  PSYCHIATRIC:  Normal judgment and insight.  Normal mood and affect.     RECENT LABS:        WBC "   Date Value Ref Range Status   05/12/2023 22.81 (H) 3.40 - 10.80 10*3/mm3 Final   05/01/2023 2.04 (L) 3.40 - 10.80 10*3/mm3 Final   04/24/2023 4.61 3.40 - 10.80 10*3/mm3 Final   04/10/2023 7.85 3.40 - 10.80 10*3/mm3 Final   04/03/2023 6.55 3.40 - 10.80 10*3/mm3 Final   03/20/2023 4.31 3.40 - 10.80 10*3/mm3 Final   03/13/2023 7.30 3.40 - 10.80 10*3/mm3 Final   03/07/2023 8.40 3.40 - 10.80 10*3/mm3 Final   02/28/2023 8.11 3.40 - 10.80 10*3/mm3 Final   02/12/2023 8.60 3.40 - 10.80 10*3/mm3 Final   02/11/2023 6.21 3.40 - 10.80 10*3/mm3 Final   02/10/2023 5.40 3.40 - 10.80 10*3/mm3 Final   02/09/2023 6.09 3.40 - 10.80 10*3/mm3 Final     Hemoglobin   Date Value Ref Range Status   05/12/2023 10.0 (L) 13.0 - 17.7 g/dL Final   05/01/2023 10.6 (L) 13.0 - 17.7 g/dL Final   04/24/2023 11.7 (L) 13.0 - 17.7 g/dL Final   04/10/2023 12.6 (L) 13.0 - 17.7 g/dL Final   04/03/2023 12.5 (L) 13.0 - 17.7 g/dL Final   03/20/2023 12.5 (L) 13.0 - 17.7 g/dL Final   03/13/2023 13.0 13.0 - 17.7 g/dL Final   03/07/2023 13.8 13.0 - 17.7 g/dL Final   02/28/2023 13.5 13.0 - 17.7 g/dL Final   02/12/2023 11.3 (L) 13.0 - 17.7 g/dL Final   02/11/2023 12.6 (L) 13.0 - 17.7 g/dL Final   02/10/2023 13.2 13.0 - 17.7 g/dL Final   02/09/2023 13.5 13.0 - 17.7 g/dL Final     Platelets   Date Value Ref Range Status   05/12/2023 143 140 - 450 10*3/mm3 Final   05/01/2023 184 140 - 450 10*3/mm3 Final   04/24/2023 223 140 - 450 10*3/mm3 Final   04/10/2023 309 140 - 450 10*3/mm3 Final   04/03/2023 439 140 - 450 10*3/mm3 Final   03/20/2023 152 140 - 450 10*3/mm3 Final   03/13/2023 244 140 - 450 10*3/mm3 Final   03/07/2023   Corrected     Comment:     Plt clumps. Results removed for inaccuracy per  Code  Corrected result. Previous result was 85 10*3/mm3 on 3/7/2023 at 1148 EST.   02/28/2023 159 140 - 450 10*3/mm3 Final   02/12/2023 150 140 - 450 10*3/mm3 Final   02/11/2023 195 140 - 450 10*3/mm3 Final   02/10/2023 215 140 - 450 10*3/mm3 Final   02/09/2023 260 140 -  450 10*3/mm3 Final       Assessment & Plan   Gallbladder cancer  - CBC & Differential  - Comprehensive Metabolic Panel        Guillermo Salgado   *Squamous cell carcinoma of the gallbladder  · Incidentally found on path review from cholecystectomy.  · Due to symptomatic cholelithiasis, laparoscopic cholecystectomy 2/11/2023, Dr. 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  5 cm.  Invades perimuscular connective tissue.  Negative for lymph-vascular invasion.  Positive for focal perineural invasion.  Cystic duct margin negative.  Grade 1.  Margins were felt to be negative per pathologist.  Pathologist stage this as a [id number] cancer.  · Dr. Horn stated the gallbladder was significantly inflamed and came out in fragments so she is not sure that staging is entirely clinically accurate.  She noted she also sent the patient to Dr. Alex Delgado of hepatobiliary surgery to determine if patient needs formal lymphadenectomy/liver resection.  · Per NCCN guidelines: Needs multiphase abdomen/pelvis CT or MRI with IV contrast and CT chest with or without IV contrast.  Consider staging laparoscopy.  If felt to be resectable, then hepatic resection with lymphadenectomy with or without bile duct excision for malignant involvement.  If felt to be unresectable, then MSI/MMR testing, TMB testing, additional molecular testing to include an TRK.  Options include systemic therapy (preferred), clinical trial (preferred), palliative XRT, or best supportive care.  · Per up-to-date guidelines, for a T2 cancer found incidentally after gallbladder surgery: Reexploration and extended cholecystectomy are also indicated.  Re-exploration identifies residual tumor in 40 to 75% of cases, and a high likelihood of liver involvement and jacki metastasis with T2 disease.  Re-resection significantly increases the likelihood of long-term DFS in patients with T2 disease.  In many series, 5-year OS increased from 25-40%, up  to % with aggressive surgery.  Up-to-date authors recommend re-resection to resect at least a 2 cm margin of the liver bed and perform portal/hepatoduodenal ligament lymphadenectomy.  · Up-to-date authors recommend adjuvant therapy for all patients with completely resected T1b or higher or node positive or margin positive gallbladder cancer.  Up-to-date authors note [facility] suggests adjuvant therapy for all patients with resected gallbladder cancer.  Up-to-date authors note there is no consensus on the optimal adjuvant therapy but they recommend 6 months of postoperative chemotherapy alone with Xeloda monotherapy for most patients.  They note if Xeloda is chosen they start treatment with no more than 1500 mg total dose twice daily.  They note an alternative approaches combining concomitant 5-FU based chemoradiotherapy with 4 months of systemic chemotherapy especially with patients with margin positive disease.  They note in this situation chemotherapy can be with Xeloda monotherapy or Xeloda plus Gemzar.  They note for patients who received chemo XRT plus adjuvant chemo, there is no consensus on sequencing.  In general they state it is preferable to start with chemotherapy first, complete 3 to 4 months of systemic exposure because this will avoid XRT for patients who are destined to develop early distant disease.  · Dr. Alex Delgado, surgical oncology, states although LAD not read on MRI 2/10/2023, he sees bulky LAD on his imaging review.  He states this is currently not resectable due to the bulky LAD and requests chemotherapy in hopes this will become resectable.  He requests avoiding XRT.  · Per the Topaz 1 trial, plan Gemzar 1000 mg/m2 D1, D8 and cisplatin 25 mg/m2 D1, D8 and durvalumab 1500 mg flat dose.  Cycles every 3 weeks.  Up to 8 cycles.  This can be followed by durvalumab 1500 mg flat dose every 4 weeks.  Durvalumab was associated with a longer PFS and a higher objective response rate (26.7% versus  18.7% without durvalumab).  The Topaz 1 trial included previously untreated unresectable locally advanced or metastatic intrahepatic or extrahepatic cholangiocarcinoma or gallbladder cancer.  Also, cisplatin and Gemzar is a common regimen in squamous cell carcinoma of a more common primary (lung).  Therefore, I feel cisplatin Gemzar durvalumab is the best regimen for this currently unresectable squamous cell carcinoma of the gallbladder.  · Dr. Delgado is recommended a prechemotherapy PET scan and then to check a PET scan again after roughly 2 months of chemotherapy to see if there has been enough improvement to see if this is resectable.  · PET 3/2/2023: Intensely active tanner hepatis nodes, and ill-defined hypodensity in the liver parenchyma adjacent to gallbladder fossa, corresponding to areas of apparent masslike invasion seen on recent MRI, felt to be concerning for malignant invasion of the liver.  A few subcentimeter hypodense lesions in the liver too small to characterize.  Could not exclude peritoneal invasion/carcinomatosis.  Suggestion of a patent active thyroid nodule.  Radiologist recommended correlation with thyroid ultrasound.  · Pretreatment CA 19-9 and CEA normal  · 3/13/2023: Begin Gemzar 1000 mg/m2 D1, D8 and cisplatin 25 mg/m2 D1, D8 and durvalumab 1500 mg flat dose.  Cycles every 3 weeks.  Up to 8 cycles.  This can be followed by durvalumab 1500 mg flat dose every 4 weeks   · (if this cannot be resected, then the Topaz 1 trial continue durvalumab until progression or toxicity.  If this can be resected, likely would not continue single agent durvalumab).  · 3/20/2023, C1 D8: AST 86, .  Stop atorvastatin that was resumed at discharge.  Hold Gemzar.  Give cisplatin.  · 3/27/2023: AST 25, ALT 57.  With improvement in LFTs after holding Gemzar, thought to Gemzar likely the cause.  However:  · 4/3/23, C2 D1: AST up to 116, ALT up to 213.  Held Gemzar again (per DrMarcial #2).  The thought was if  further elevation in LFTs may need to consider steroids for durvalumab liver toxicity.  · 4/10/23, C2 D8: AST 18, ALT 49.  Gemzar given at 25% dose reduction.  · 5/1/2023: C3d 8, one-time dose reduction of Gemzar by another 25% due to .  Maintain same dose cisplatin.  Add Neulasta if insurance will allow  · (Was having mild increase in tinnitus but this is tolerable.  He understands cisplatin can significantly and permanently worsen tinnitus.  He denies hearing loss.  He understands this risk and wants to continue cisplatin at same dose.  · PET 5/8/2023 (after C3d8): A few new pulmonary nodules.  Cluster of index nodules, RLL, up to 0.8 cm.  Index sub-6 mm nodule RUL also new.  Radiologist notes the pulmonary nodules are nonspecific but in context are concerning for lung metastasis.  Gastrohepatic node 1.4 cm from 0.8 cm with SUV 6.7, from 3.9.  Raven hepatic node 2.7 cm, unchanged in size, but SUV 8.8, from 7.4.  The liver along the gallbladder fossa with SUV 7.8, from 9.5, the size of the hypermetabolic activity is unchanged, 5.1 x 3.5 cm.  Smaller area of uptake above that of the adjacent hepatic parenchyma, SUV 3, as before.  New or more defined soft tissue density nodules anterior inferior to the central liver.  Index lesion inferior to the gallbladder fossa now much more defined, 1.4 x 0.9 cm with SUV 3.3.  Index lesion along anterior lateral aspect of central liver new, 0.7 cm.  Radiologist notes this suggests peritoneal carcinomatosis.  · This seems mostly consistent with progression.  · NCCN options include FOLFOX, Gemzar Abraxane, FOLFIRI, Regorafenib.  FOLFOX is preferred  · 5/12/2023: Dr. Alex Delgado agrees with me: Head is consistent with progression.  He is no longer felt to be a surgical candidate.  Patient understands this is no longer felt to be curable.  Progressed on Gemzar cisplatin durvalumab.  Therefore, per NCCN guidelines switch to FOLFOX.  He requests 2 weeks break before beginning  FOLFOX to try and improve the fatigue that worsened towards the end of his last chemo regimen.    *Suggestion of PET active thyroid nodule on PET 3/2/2023.  · PET 3/2/2023: Radiologist recommended thyroid ultrasound.    · 3/7/2023: Ordered thyroid ultrasound (but patient understands even if something looks concerning, addressed the gallbladder cancer first and only work on the thyroid nodule if gallbladder cancer treatment has completed and is felt to be in remission  · Thyroid ultrasound 3/8/2023: TI-RADS 3.  Radiologist stated consider 1 year follow-up if clinically indicated.  · 5/12/2023: Gallbladder cancer is now metastatic (and noncurable).  Therefore, plan no further follow-up of thyroid nodule    *Not needed now but, regarding surveillance after completing adjuvant therapy:  · Up-to-date authors state there is no agreed-upon surveillance but after completing adjuvant therapy they follow CEA and CA 19 9 every 3 to 4 months for the first 2 years after surgery, then every 6 months for 1 more year with imaging only as clinically indicated.  · NCCN guidelines state consider imaging every 3 to 6 months x 2 years, then every 6 to 12 months for up to 5 years or as clinically indicated and consider CEA and CA 19-9 as clinically indicated.    Plan  · Per his request, wait 2 weeks before beginning FOLFOX in hopes his fatigue will improve  · MD or NP with each 2-week FOLFOX cycle, beginning 5/29/2023.  · Arranged FOLFOX through cycle 5, 7/17/2023.  No provider that date because he will see me the Friday before, as I am not in the office on 7/17/2023 and he will be due for scan review.  · A few days before 7/17/2023: CT CAP with contrast, compare to PET from 5/8/2023  CBC and CMP today.    43 minutes.  Total time.  Same day.

## 2023-05-12 ENCOUNTER — APPOINTMENT (OUTPATIENT)
Dept: OTHER | Facility: HOSPITAL | Age: 73
End: 2023-05-12
Payer: MEDICARE

## 2023-05-12 ENCOUNTER — OFFICE VISIT (OUTPATIENT)
Dept: ONCOLOGY | Facility: CLINIC | Age: 73
End: 2023-05-12
Payer: MEDICARE

## 2023-05-12 VITALS
TEMPERATURE: 97.8 F | HEART RATE: 101 BPM | DIASTOLIC BLOOD PRESSURE: 84 MMHG | SYSTOLIC BLOOD PRESSURE: 127 MMHG | BODY MASS INDEX: 24.55 KG/M2 | RESPIRATION RATE: 18 BRPM | HEIGHT: 68 IN | OXYGEN SATURATION: 96 % | WEIGHT: 162 LBS

## 2023-05-12 DIAGNOSIS — C23 GALLBLADDER CANCER: Primary | ICD-10-CM

## 2023-05-12 LAB
ALBUMIN SERPL-MCNC: 4.1 G/DL (ref 3.5–5.2)
ALBUMIN/GLOB SERPL: 1.3 G/DL
ALP SERPL-CCNC: 121 U/L (ref 39–117)
ALT SERPL W P-5'-P-CCNC: 12 U/L (ref 1–41)
ANION GAP SERPL CALCULATED.3IONS-SCNC: 10.9 MMOL/L (ref 5–15)
AST SERPL-CCNC: 26 U/L (ref 1–40)
BASOPHILS # BLD AUTO: 0.05 10*3/MM3 (ref 0–0.2)
BASOPHILS NFR BLD AUTO: 0.2 % (ref 0–1.5)
BILIRUB SERPL-MCNC: 0.4 MG/DL (ref 0–1.2)
BUN SERPL-MCNC: 12 MG/DL (ref 8–23)
BUN/CREAT SERPL: 12.5 (ref 7–25)
CALCIUM SPEC-SCNC: 9.6 MG/DL (ref 8.6–10.5)
CHLORIDE SERPL-SCNC: 101 MMOL/L (ref 98–107)
CO2 SERPL-SCNC: 26.1 MMOL/L (ref 22–29)
CREAT SERPL-MCNC: 0.96 MG/DL (ref 0.76–1.27)
DEPRECATED RDW RBC AUTO: 45.9 FL (ref 37–54)
EGFRCR SERPLBLD CKD-EPI 2021: 82.9 ML/MIN/1.73
EOSINOPHIL # BLD AUTO: 0.01 10*3/MM3 (ref 0–0.4)
EOSINOPHIL NFR BLD AUTO: 0 % (ref 0.3–6.2)
ERYTHROCYTE [DISTWIDTH] IN BLOOD BY AUTOMATED COUNT: 15.1 % (ref 12.3–15.4)
GLOBULIN UR ELPH-MCNC: 3.1 GM/DL
GLUCOSE SERPL-MCNC: 112 MG/DL (ref 65–99)
HCT VFR BLD AUTO: 29.3 % (ref 37.5–51)
HGB BLD-MCNC: 10 G/DL (ref 13–17.7)
IMM GRANULOCYTES # BLD AUTO: 4.1 10*3/MM3 (ref 0–0.05)
IMM GRANULOCYTES NFR BLD AUTO: 18 % (ref 0–0.5)
LYMPHOCYTES # BLD AUTO: 1.81 10*3/MM3 (ref 0.7–3.1)
LYMPHOCYTES # BLD MANUAL: 2.28 10*3/MM3 (ref 0.7–3.1)
LYMPHOCYTES NFR BLD AUTO: 7.9 % (ref 19.6–45.3)
LYMPHOCYTES NFR BLD MANUAL: 7 % (ref 5–12)
MCH RBC QN AUTO: 31.1 PG (ref 26.6–33)
MCHC RBC AUTO-ENTMCNC: 34.1 G/DL (ref 31.5–35.7)
MCV RBC AUTO: 91 FL (ref 79–97)
METAMYELOCYTES NFR BLD MANUAL: 7 % (ref 0–0)
MONOCYTES # BLD AUTO: 2.44 10*3/MM3 (ref 0.1–0.9)
MONOCYTES # BLD: 1.6 10*3/MM3 (ref 0.1–0.9)
MONOCYTES NFR BLD AUTO: 10.7 % (ref 5–12)
MYELOCYTES NFR BLD MANUAL: 5 % (ref 0–0)
NEUTROPHILS # BLD AUTO: 16.2 10*3/MM3 (ref 1.7–7)
NEUTROPHILS NFR BLD AUTO: 14.4 10*3/MM3 (ref 1.7–7)
NEUTROPHILS NFR BLD AUTO: 63.2 % (ref 42.7–76)
NEUTROPHILS NFR BLD MANUAL: 71 % (ref 42.7–76)
NRBC BLD AUTO-RTO: 0.4 /100 WBC (ref 0–0.2)
PLAT MORPH BLD: NORMAL
PLATELET # BLD AUTO: 143 10*3/MM3 (ref 140–450)
PMV BLD AUTO: 9.4 FL (ref 6–12)
POTASSIUM SERPL-SCNC: 4.3 MMOL/L (ref 3.5–5.2)
PROT SERPL-MCNC: 7.2 G/DL (ref 6–8.5)
RBC # BLD AUTO: 3.22 10*6/MM3 (ref 4.14–5.8)
RBC MORPH BLD: NORMAL
SODIUM SERPL-SCNC: 138 MMOL/L (ref 136–145)
VARIANT LYMPHS NFR BLD MANUAL: 10 % (ref 19.6–45.3)
WBC MORPH BLD: NORMAL
WBC NRBC COR # BLD: 22.81 10*3/MM3 (ref 3.4–10.8)

## 2023-05-12 PROCEDURE — 36415 COLL VENOUS BLD VENIPUNCTURE: CPT | Performed by: INTERNAL MEDICINE

## 2023-05-12 PROCEDURE — 85007 BL SMEAR W/DIFF WBC COUNT: CPT | Performed by: INTERNAL MEDICINE

## 2023-05-12 PROCEDURE — 80053 COMPREHEN METABOLIC PANEL: CPT | Performed by: INTERNAL MEDICINE

## 2023-05-12 PROCEDURE — 85025 COMPLETE CBC W/AUTO DIFF WBC: CPT | Performed by: INTERNAL MEDICINE

## 2023-05-15 ENCOUNTER — TELEPHONE (OUTPATIENT)
Dept: SURGERY | Facility: CLINIC | Age: 73
End: 2023-05-15
Payer: MEDICARE

## 2023-05-15 ENCOUNTER — TELEPHONE (OUTPATIENT)
Dept: ONCOLOGY | Facility: CLINIC | Age: 73
End: 2023-05-15
Payer: MEDICARE

## 2023-05-15 NOTE — TELEPHONE ENCOUNTER
Phoned  Damian and he has concerns with moving forward with Folfox. He does not want Oxaliplatin due to the side effects. Will notify Dr. Benedict.

## 2023-05-15 NOTE — TELEPHONE ENCOUNTER
Patient called stating he had a consultation about surgery with another doctor and would like a phone call from you to talk about the surgery.

## 2023-05-15 NOTE — TELEPHONE ENCOUNTER
Caller: Guillermo Salgado    Relationship: Self    Best call back number: [phone]    What is the best time to reach you: ASAP    Who are you requesting to speak with (clinical staff, provider,  specific staff member): SCHEDULING AND CONCETTA HURTADO    What was the call regarding: PT NEEDS THE APPT FOR CHEMO ED CHANGED TO [facility], HE SAYS IT IS IN AT [facility] BY [facility], AND ALSO HE WANTS TO SPEAK TO CONCETTA HURTADO- HAS SOME QUESTIONS ABOUT THE UPCOMING APPT.    Do you require a callback: YES

## 2023-05-16 ENCOUNTER — OFFICE VISIT (OUTPATIENT)
Dept: ONCOLOGY | Facility: CLINIC | Age: 73
End: 2023-05-16
Payer: MEDICARE

## 2023-05-16 VITALS
RESPIRATION RATE: 18 BRPM | HEART RATE: 91 BPM | BODY MASS INDEX: 24.6 KG/M2 | SYSTOLIC BLOOD PRESSURE: 121 MMHG | HEIGHT: 68 IN | OXYGEN SATURATION: 98 % | WEIGHT: 162.3 LBS | DIASTOLIC BLOOD PRESSURE: 77 MMHG | TEMPERATURE: 98.7 F

## 2023-05-16 DIAGNOSIS — C23 GALLBLADDER CANCER: Primary | ICD-10-CM

## 2023-05-16 NOTE — PROGRESS NOTES
.     REASON FOR FOLLOWUP :   Squamous cell carcinoma of the gallbladder    HISTORY OF PRESENT ILLNESS:  The patient is a 72 y.o. year old male  who is here for follow-up with the above-mentioned history.    He is back today earlier than planned because he called in stating after some more thought he does not want to begin FOLFOX.  He states he is finally beginning to feel good again after being on chemotherapy and does not want to change this.  He got the okay from the surgeon who put in the port for him to play golf again and he is looking forward to this.    Past Medical History:   Diagnosis Date   • Cancer     gallbladder   • Gilbert syndrome    • Hyperlipidemia    • Hypertension      Past Surgical History:   Procedure Laterality Date   • CHOLECYSTECTOMY WITH INTRAOPERATIVE CHOLANGIOGRAM N/A 02/11/2023    Procedure: CHOLECYSTECTOMY LAPAROSCOPIC INTRAOPERATIVE CHOLANGIOGRAM;  Surgeon: Vianey Horn MD;  Location: [facility];  Service: General;  Laterality: N/A;   • COLONOSCOPY     • PILONIDAL CYST DRAINAGE     • VENOUS ACCESS DEVICE (PORT) INSERTION N/A 3/9/2023    Procedure: INSERTION VENOUS ACCESS DEVICE;  Surgeon: Vianey Horn MD;  Location: [facility];  Service: General;  Laterality: N/A;       MEDICATIONS    Current Outpatient Medications:   •  lisinopril (PRINIVIL,ZESTRIL) 20 MG tablet, Take 1 tablet by mouth Daily., Disp: , Rfl:   •  OLANZapine (zyPREXA) 5 MG tablet, Take 1 tablet by mouth Every Night. Take on day 2, 3, and 4 and Days 9, 10, 11 after chemotherapy., Disp: 48 tablet, Rfl: 0  •  ondansetron (ZOFRAN) 8 MG tablet, Take 1 tablet by mouth 3 (Three) Times a Day As Needed for Nausea or Vomiting., Disp: 30 tablet, Rfl: 5    ALLERGIES:   No Known Allergies    SOCIAL HISTORY:       Social History     Socioeconomic History   • Marital status:      Spouse name: Oly   Tobacco Use   • Smoking status: Former     Types: Cigarettes   Vaping Use   • Vaping Use: Never used  "  Substance and Sexual Activity   • Alcohol use: Yes   • Drug use: Never   • Sexual activity: Defer         FAMILY HISTORY:  Family History   Problem Relation Age of Onset   • Malig Hyperthermia Neg Hx        REVIEW OF SYSTEMS:  Review of Systems   Constitutional: Negative for activity change.   HENT: Negative for nosebleeds and trouble swallowing.    Respiratory: Negative for shortness of breath and wheezing.    Cardiovascular: Negative for chest pain and palpitations.   Gastrointestinal: Positive for constipation. Negative for diarrhea and nausea.   Genitourinary: Negative for dysuria and hematuria.   Musculoskeletal: Negative for arthralgias and myalgias.   Neurological: Negative for seizures and syncope.   Hematological: Negative for adenopathy. Does not bruise/bleed easily.   Psychiatric/Behavioral: Negative for confusion.              Vitals:    05/16/23 1424   BP: 121/77   Pulse: 91   Resp: 18   Temp: 98.7 °F (37.1 °C)   TempSrc: Temporal   SpO2: 98%   Weight: 73.6 kg (162 lb 4.8 oz)   Height: 173 cm (68.11\")   PainSc: 0-No pain         5/16/2023     2:27 PM   Current Status   ECOG score 0      PHYSICAL EXAM:        CONSTITUTIONAL:  Vital signs reviewed.  No distress, looks comfortable.  EYES:  Conjunctiva and lids unremarkable.  PERRLA  EARS,NOSE,MOUTH,THROAT:  Ears and nose appear unremarkable.  Lips, teeth, gums appear unremarkable.  RESPIRATORY:  Normal respiratory effort.  Lungs clear to auscultation bilaterally.  CARDIOVASCULAR:  Normal S1, S2.  No murmurs rubs or gallops.  No significant lower extremity edema.  GASTROINTESTINAL: Abdomen appears unremarkable.  Nontender.  No hepatomegaly.  No splenomegaly.  LYMPHATIC:  No cervical, supraclavicular, axillary lymphadenopathy.  SKIN:  Warm.  No rashes.  PSYCHIATRIC:  Normal judgment and insight.  Normal mood and affect.        RECENT LABS:        WBC   Date Value Ref Range Status   05/12/2023 22.81 (H) 3.40 - 10.80 10*3/mm3 Final   05/01/2023 2.04 (L) 3.40 " - 10.80 10*3/mm3 Final   04/24/2023 4.61 3.40 - 10.80 10*3/mm3 Final   04/10/2023 7.85 3.40 - 10.80 10*3/mm3 Final   04/03/2023 6.55 3.40 - 10.80 10*3/mm3 Final   03/20/2023 4.31 3.40 - 10.80 10*3/mm3 Final   03/13/2023 7.30 3.40 - 10.80 10*3/mm3 Final   03/07/2023 8.40 3.40 - 10.80 10*3/mm3 Final   02/28/2023 8.11 3.40 - 10.80 10*3/mm3 Final   02/12/2023 8.60 3.40 - 10.80 10*3/mm3 Final   02/11/2023 6.21 3.40 - 10.80 10*3/mm3 Final   02/10/2023 5.40 3.40 - 10.80 10*3/mm3 Final   02/09/2023 6.09 3.40 - 10.80 10*3/mm3 Final     Hemoglobin   Date Value Ref Range Status   05/12/2023 10.0 (L) 13.0 - 17.7 g/dL Final   05/01/2023 10.6 (L) 13.0 - 17.7 g/dL Final   04/24/2023 11.7 (L) 13.0 - 17.7 g/dL Final   04/10/2023 12.6 (L) 13.0 - 17.7 g/dL Final   04/03/2023 12.5 (L) 13.0 - 17.7 g/dL Final   03/20/2023 12.5 (L) 13.0 - 17.7 g/dL Final   03/13/2023 13.0 13.0 - 17.7 g/dL Final   03/07/2023 13.8 13.0 - 17.7 g/dL Final   02/28/2023 13.5 13.0 - 17.7 g/dL Final   02/12/2023 11.3 (L) 13.0 - 17.7 g/dL Final   02/11/2023 12.6 (L) 13.0 - 17.7 g/dL Final   02/10/2023 13.2 13.0 - 17.7 g/dL Final   02/09/2023 13.5 13.0 - 17.7 g/dL Final     Platelets   Date Value Ref Range Status   05/12/2023 143 140 - 450 10*3/mm3 Final   05/01/2023 184 140 - 450 10*3/mm3 Final   04/24/2023 223 140 - 450 10*3/mm3 Final   04/10/2023 309 140 - 450 10*3/mm3 Final   04/03/2023 439 140 - 450 10*3/mm3 Final   03/20/2023 152 140 - 450 10*3/mm3 Final   03/13/2023 244 140 - 450 10*3/mm3 Final   03/07/2023   Corrected     Comment:     Plt clumps. Results removed for inaccuracy per  Code  Corrected result. Previous result was 85 10*3/mm3 on 3/7/2023 at 1148 EST.   02/28/2023 159 140 - 450 10*3/mm3 Final   02/12/2023 150 140 - 450 10*3/mm3 Final   02/11/2023 195 140 - 450 10*3/mm3 Final   02/10/2023 215 140 - 450 10*3/mm3 Final   02/09/2023 260 140 - 450 10*3/mm3 Final       Assessment & Plan   There are no diagnoses linked to this encounter.      Guillermo BURNETT  Damian   *Squamous cell carcinoma of the gallbladder  · Incidentally found on path review from cholecystectomy.  · Due to symptomatic cholelithiasis, laparoscopic cholecystectomy 2/11/2023, Dr. 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  5 cm.  Invades perimuscular connective tissue.  Negative for lymph-vascular invasion.  Positive for focal perineural invasion.  Cystic duct margin negative.  Grade 1.  Margins were felt to be negative per pathologist.  Pathologist stage this as a [id number] cancer.  · Dr. Horn stated the gallbladder was significantly inflamed and came out in fragments so she is not sure that staging is entirely clinically accurate.  She noted she also sent the patient to Dr. Alex Delgado of hepatobiliary surgery to determine if patient needs formal lymphadenectomy/liver resection.  · Per NCCN guidelines: Needs multiphase abdomen/pelvis CT or MRI with IV contrast and CT chest with or without IV contrast.  Consider staging laparoscopy.  If felt to be resectable, then hepatic resection with lymphadenectomy with or without bile duct excision for malignant involvement.  If felt to be unresectable, then MSI/MMR testing, TMB testing, additional molecular testing to include an TRK.  Options include systemic therapy (preferred), clinical trial (preferred), palliative XRT, or best supportive care.  · Per up-to-date guidelines, for a T2 cancer found incidentally after gallbladder surgery: Reexploration and extended cholecystectomy are also indicated.  Re-exploration identifies residual tumor in 40 to 75% of cases, and a high likelihood of liver involvement and jacki metastasis with T2 disease.  Re-resection significantly increases the likelihood of long-term DFS in patients with T2 disease.  In many series, 5-year OS increased from 25-40%, up to % with aggressive surgery.  Up-to-date authors recommend re-resection to resect at least a 2 cm margin of the liver bed and  perform portal/hepatoduodenal ligament lymphadenectomy.  · Up-to-date authors recommend adjuvant therapy for all patients with completely resected T1b or higher or node positive or margin positive gallbladder cancer.  Up-to-date authors note [facility] suggests adjuvant therapy for all patients with resected gallbladder cancer.  Up-to-date authors note there is no consensus on the optimal adjuvant therapy but they recommend 6 months of postoperative chemotherapy alone with Xeloda monotherapy for most patients.  They note if Xeloda is chosen they start treatment with no more than 1500 mg total dose twice daily.  They note an alternative approaches combining concomitant 5-FU based chemoradiotherapy with 4 months of systemic chemotherapy especially with patients with margin positive disease.  They note in this situation chemotherapy can be with Xeloda monotherapy or Xeloda plus Gemzar.  They note for patients who received chemo XRT plus adjuvant chemo, there is no consensus on sequencing.  In general they state it is preferable to start with chemotherapy first, complete 3 to 4 months of systemic exposure because this will avoid XRT for patients who are destined to develop early distant disease.  · Dr. Alex Delgado, surgical oncology, states although LAD not read on MRI 2/10/2023, he sees bulky LAD on his imaging review.  He states this is currently not resectable due to the bulky LAD and requests chemotherapy in hopes this will become resectable.  He requests avoiding XRT.  · Per the Topaz 1 trial, plan Gemzar 1000 mg/m2 D1, D8 and cisplatin 25 mg/m2 D1, D8 and durvalumab 1500 mg flat dose.  Cycles every 3 weeks.  Up to 8 cycles.  This can be followed by durvalumab 1500 mg flat dose every 4 weeks.  Durvalumab was associated with a longer PFS and a higher objective response rate (26.7% versus 18.7% without durvalumab).  The Topaz 1 trial included previously untreated unresectable locally advanced or metastatic intrahepatic or  extrahepatic cholangiocarcinoma or gallbladder cancer.  Also, cisplatin and Gemzar is a common regimen in squamous cell carcinoma of a more common primary (lung).  Therefore, I feel cisplatin Gemzar durvalumab is the best regimen for this currently unresectable squamous cell carcinoma of the gallbladder.  · Dr. Delgado is recommended a prechemotherapy PET scan and then to check a PET scan again after roughly 2 months of chemotherapy to see if there has been enough improvement to see if this is resectable.  · PET 3/2/2023: Intensely active tanner hepatis nodes, and ill-defined hypodensity in the liver parenchyma adjacent to gallbladder fossa, corresponding to areas of apparent masslike invasion seen on recent MRI, felt to be concerning for malignant invasion of the liver.  A few subcentimeter hypodense lesions in the liver too small to characterize.  Could not exclude peritoneal invasion/carcinomatosis.  Suggestion of a patent active thyroid nodule.  Radiologist recommended correlation with thyroid ultrasound.  · Pretreatment CA 19-9 and CEA normal  · 3/13/2023: Begin Gemzar 1000 mg/m2 D1, D8 and cisplatin 25 mg/m2 D1, D8 and durvalumab 1500 mg flat dose.  Cycles every 3 weeks.  Up to 8 cycles.  This can be followed by durvalumab 1500 mg flat dose every 4 weeks   · (if this cannot be resected, then the Topaz 1 trial continue durvalumab until progression or toxicity.  If this can be resected, likely would not continue single agent durvalumab).  · 3/20/2023, C1 D8: AST 86, .  Stop atorvastatin that was resumed at discharge.  Hold Gemzar.  Give cisplatin.  · 3/27/2023: AST 25, ALT 57.  With improvement in LFTs after holding Gemzar, thought to Gemzar likely the cause.  However:  · 4/3/23, C2 D1: AST up to 116, ALT up to 213.  Held Gemzar again (per DrMarcial #2).  The thought was if further elevation in LFTs may need to consider steroids for durvalumab liver toxicity.  · 4/10/23, C2 D8: AST 18, ALT 49.  Gemzar given at 25%  dose reduction.  · 5/1/2023: C3d 8, one-time dose reduction of Gemzar by another 25% due to .  Maintain same dose cisplatin.  Add Neulasta if insurance will allow  · (Was having mild increase in tinnitus but this is tolerable.  He understands cisplatin can significantly and permanently worsen tinnitus.  He denies hearing loss.  He understands this risk and wants to continue cisplatin at same dose.  · PET 5/8/2023 (after C3d8): A few new pulmonary nodules.  Cluster of index nodules, RLL, up to 0.8 cm.  Index sub-6 mm nodule RUL also new.  Radiologist notes the pulmonary nodules are nonspecific but in context are concerning for lung metastasis.  Gastrohepatic node 1.4 cm from 0.8 cm with SUV 6.7, from 3.9.  Raven hepatic node 2.7 cm, unchanged in size, but SUV 8.8, from 7.4.  The liver along the gallbladder fossa with SUV 7.8, from 9.5, the size of the hypermetabolic activity is unchanged, 5.1 x 3.5 cm.  Smaller area of uptake above that of the adjacent hepatic parenchyma, SUV 3, as before.  New or more defined soft tissue density nodules anterior inferior to the central liver.  Index lesion inferior to the gallbladder fossa now much more defined, 1.4 x 0.9 cm with SUV 3.3.  Index lesion along anterior lateral aspect of central liver new, 0.7 cm.  Radiologist notes this suggests peritoneal carcinomatosis.  · This seems mostly consistent with progression.  · NCCN options include FOLFOX, Gemzar Abraxane, FOLFIRI, Regorafenib.  FOLFOX is preferred  · 5/12/2023: Dr. Alex Delgado agrees with me: PET is consistent with progression.  He is no longer felt to be a surgical candidate.  Patient understands this is no longer felt to be curable.  Progressed on Gemzar cisplatin durvalumab.  Therefore, per NCCN guidelines switch to FOLFOX.  He requests 2 weeks break before beginning FOLFOX to try and improve the fatigue that worsened towards the end of his last chemo regimen.  · 5/16/2023: Patient wants a break from  chemotherapy and does not want to begin FOLFOX.  He would like observation and 1 significant growth is seen on scans he will consider FOLFIRI.  He would like to avoid Oxaliplatin because he is worried about developing neuropathy.    *Suggestion of PET active thyroid nodule on PET 3/2/2023.  · PET 3/2/2023: Radiologist recommended thyroid ultrasound.    · 3/7/2023: Ordered thyroid ultrasound (but patient understands even if something looks concerning, addressed the gallbladder cancer first and only work on the thyroid nodule if gallbladder cancer treatment has completed and is felt to be in remission  · Thyroid ultrasound 3/8/2023: TI-RADS 3.  Radiologist stated consider 1 year follow-up if clinically indicated.  · 5/12/2023: Gallbladder cancer is now metastatic (and noncurable).  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  Did not worsen.  No issues otherwise.    Plan  · He declines FOLFOX.  He would like observation for now  · When significant progression is seen, he wants FOLFIRI as he is worried about getting neuropathy from oxaliplatin  · He understands this cancer progressed while on cisplatin Gemzar durvalumab.  Therefore, it would be less likely to respond to further chemotherapy attempts.  He did not like how he felt towards the end of first-line therapy, mainly with fatigue.  He understands the cancer could significantly progress while waiting.  However, quality of life is extremely important to him and he wants to enjoy feeling good while he can, especially since he recently got clearance from his surgeon to play golf with his port.  · Port flush 1 month.  · Mid July see me.  A few days prior: CT CAP with contrast, compare to PET from 5/8/2023    41 minutes.  Total time.  Same day.

## 2023-06-12 ENCOUNTER — INFUSION (OUTPATIENT)
Dept: ONCOLOGY | Facility: HOSPITAL | Age: 73
End: 2023-06-12
Payer: MEDICARE

## 2023-06-12 DIAGNOSIS — C23 GALLBLADDER CANCER: ICD-10-CM

## 2023-06-12 DIAGNOSIS — Z45.9 ENCOUNTER FOR MANAGEMENT OF IMPLANTED DEVICE: Primary | ICD-10-CM

## 2023-06-12 PROCEDURE — 25010000002 HEPARIN LOCK FLUSH PER 10 UNITS: Performed by: INTERNAL MEDICINE

## 2023-06-12 PROCEDURE — 96523 IRRIG DRUG DELIVERY DEVICE: CPT

## 2023-06-12 RX ORDER — HEPARIN SODIUM (PORCINE) LOCK FLUSH IV SOLN 100 UNIT/ML 100 UNIT/ML
500 SOLUTION INTRAVENOUS AS NEEDED
OUTPATIENT
Start: 2023-06-12

## 2023-06-12 RX ORDER — SODIUM CHLORIDE 0.9 % (FLUSH) 0.9 %
10 SYRINGE (ML) INJECTION AS NEEDED
OUTPATIENT
Start: 2023-06-12

## 2023-06-12 RX ORDER — SODIUM CHLORIDE 0.9 % (FLUSH) 0.9 %
10 SYRINGE (ML) INJECTION AS NEEDED
Status: DISCONTINUED | OUTPATIENT
Start: 2023-06-12 | End: 2023-06-12 | Stop reason: HOSPADM

## 2023-06-12 RX ORDER — HEPARIN SODIUM (PORCINE) LOCK FLUSH IV SOLN 100 UNIT/ML 100 UNIT/ML
500 SOLUTION INTRAVENOUS AS NEEDED
Status: DISCONTINUED | OUTPATIENT
Start: 2023-06-12 | End: 2023-06-12 | Stop reason: HOSPADM

## 2023-06-12 RX ADMIN — Medication 10 ML: at 10:07

## 2023-06-12 RX ADMIN — Medication 500 UNITS: at 10:07

## 2023-07-10 LAB
ALBUMIN SERPL-MCNC: 3.9 G/DL (ref 3.5–5.2)
ALBUMIN/GLOB SERPL: 1 G/DL
ALP SERPL-CCNC: 88 U/L (ref 39–117)
ALT SERPL W P-5'-P-CCNC: 8 U/L (ref 1–41)
ANION GAP SERPL CALCULATED.3IONS-SCNC: 9.7 MMOL/L (ref 5–15)
AST SERPL-CCNC: 16 U/L (ref 1–40)
BASOPHILS # BLD AUTO: 0 10*3/MM3 (ref 0–0.2)
BASOPHILS NFR BLD AUTO: 0 % (ref 0–1.5)
BILIRUB SERPL-MCNC: 1 MG/DL (ref 0–1.2)
BUN SERPL-MCNC: 15 MG/DL (ref 8–23)
BUN/CREAT SERPL: 15 (ref 7–25)
CALCIUM SPEC-SCNC: 10 MG/DL (ref 8.6–10.5)
CHLORIDE SERPL-SCNC: 99 MMOL/L (ref 98–107)
CO2 SERPL-SCNC: 27.3 MMOL/L (ref 22–29)
CREAT SERPL-MCNC: 1 MG/DL (ref 0.76–1.27)
DEPRECATED RDW RBC AUTO: 49.4 FL (ref 37–54)
EGFRCR SERPLBLD CKD-EPI 2021: 78.5 ML/MIN/1.73
EOSINOPHIL # BLD AUTO: 0 10*3/MM3 (ref 0–0.4)
EOSINOPHIL NFR BLD AUTO: 0 % (ref 0.3–6.2)
ERYTHROCYTE [DISTWIDTH] IN BLOOD BY AUTOMATED COUNT: 14.1 % (ref 12.3–15.4)
GLOBULIN UR ELPH-MCNC: 4.1 GM/DL
GLUCOSE SERPL-MCNC: 120 MG/DL (ref 65–99)
HCT VFR BLD AUTO: 36.6 % (ref 37.5–51)
HGB BLD-MCNC: 12 G/DL (ref 13–17.7)
IMM GRANULOCYTES # BLD AUTO: 0 10*3/MM3 (ref 0–0.05)
IMM GRANULOCYTES NFR BLD AUTO: 0 % (ref 0–0.5)
LYMPHOCYTES # BLD AUTO: 0.84 10*3/MM3 (ref 0.7–3.1)
LYMPHOCYTES NFR BLD AUTO: 18.8 % (ref 19.6–45.3)
MCH RBC QN AUTO: 31.3 PG (ref 26.6–33)
MCHC RBC AUTO-ENTMCNC: 32.8 G/DL (ref 31.5–35.7)
MCV RBC AUTO: 95.6 FL (ref 79–97)
MONOCYTES # BLD AUTO: 0.42 10*3/MM3 (ref 0.1–0.9)
MONOCYTES NFR BLD AUTO: 9.4 % (ref 5–12)
NEUTROPHILS NFR BLD AUTO: 3.21 10*3/MM3 (ref 1.7–7)
NEUTROPHILS NFR BLD AUTO: 71.8 % (ref 42.7–76)
NRBC BLD AUTO-RTO: 0 /100 WBC (ref 0–0.2)
PLATELET # BLD AUTO: 204 10*3/MM3 (ref 140–450)
PMV BLD AUTO: 8.7 FL (ref 6–12)
POTASSIUM SERPL-SCNC: 4.6 MMOL/L (ref 3.5–5.2)
PROT SERPL-MCNC: 8 G/DL (ref 6–8.5)
RBC # BLD AUTO: 3.83 10*6/MM3 (ref 4.14–5.8)
SODIUM SERPL-SCNC: 136 MMOL/L (ref 136–145)
WBC NRBC COR # BLD: 4.47 10*3/MM3 (ref 3.4–10.8)

## 2023-07-31 ENCOUNTER — NUTRITION (OUTPATIENT)
Dept: OTHER | Facility: HOSPITAL | Age: 73
End: 2023-07-31
Payer: MEDICARE

## 2023-07-31 ENCOUNTER — OFFICE VISIT (OUTPATIENT)
Dept: ONCOLOGY | Facility: CLINIC | Age: 73
End: 2023-07-31
Payer: MEDICARE

## 2023-07-31 ENCOUNTER — INFUSION (OUTPATIENT)
Dept: ONCOLOGY | Facility: HOSPITAL | Age: 73
End: 2023-07-31
Payer: MEDICARE

## 2023-07-31 VITALS
TEMPERATURE: 98.4 F | HEIGHT: 68 IN | SYSTOLIC BLOOD PRESSURE: 123 MMHG | HEART RATE: 66 BPM | BODY MASS INDEX: 24.9 KG/M2 | DIASTOLIC BLOOD PRESSURE: 79 MMHG | OXYGEN SATURATION: 100 % | WEIGHT: 164.3 LBS

## 2023-07-31 DIAGNOSIS — C23 GALLBLADDER CANCER: Primary | ICD-10-CM

## 2023-07-31 DIAGNOSIS — Z79.899 HIGH RISK MEDICATION USE: ICD-10-CM

## 2023-07-31 DIAGNOSIS — C23 GALLBLADDER CANCER: ICD-10-CM

## 2023-07-31 DIAGNOSIS — I81 PORTAL VEIN THROMBOSIS: ICD-10-CM

## 2023-07-31 LAB
ALBUMIN SERPL-MCNC: 3.7 G/DL (ref 3.5–5.2)
ALBUMIN/GLOB SERPL: 1.4 G/DL
ALP SERPL-CCNC: 66 U/L (ref 39–117)
ALT SERPL W P-5'-P-CCNC: <5 U/L (ref 1–41)
ANION GAP SERPL CALCULATED.3IONS-SCNC: 15.2 MMOL/L (ref 5–15)
AST SERPL-CCNC: 23 U/L (ref 1–40)
BASOPHILS # BLD AUTO: 0 10*3/MM3 (ref 0–0.2)
BASOPHILS NFR BLD AUTO: 0 % (ref 0–1.5)
BILIRUB SERPL-MCNC: 0.5 MG/DL (ref 0–1.2)
BUN SERPL-MCNC: 13 MG/DL (ref 8–23)
BUN/CREAT SERPL: 13.4 (ref 7–25)
CALCIUM SPEC-SCNC: 8.9 MG/DL (ref 8.6–10.5)
CHLORIDE SERPL-SCNC: 100 MMOL/L (ref 98–107)
CO2 SERPL-SCNC: 20.8 MMOL/L (ref 22–29)
CREAT SERPL-MCNC: 0.97 MG/DL (ref 0.7–1.3)
DEPRECATED RDW RBC AUTO: 47.8 FL (ref 37–54)
EGFRCR SERPLBLD CKD-EPI 2021: 81.4 ML/MIN/1.73
EOSINOPHIL # BLD AUTO: 0 10*3/MM3 (ref 0–0.4)
EOSINOPHIL NFR BLD AUTO: 0 % (ref 0.3–6.2)
ERYTHROCYTE [DISTWIDTH] IN BLOOD BY AUTOMATED COUNT: 13.8 % (ref 12.3–15.4)
GLOBULIN UR ELPH-MCNC: 2.6 GM/DL
GLUCOSE SERPL-MCNC: 111 MG/DL (ref 65–99)
HCT VFR BLD AUTO: 35.1 % (ref 37.5–51)
HGB BLD-MCNC: 11.3 G/DL (ref 13–17.7)
IMM GRANULOCYTES # BLD AUTO: 0 10*3/MM3 (ref 0–0.05)
IMM GRANULOCYTES NFR BLD AUTO: 0 % (ref 0–0.5)
LYMPHOCYTES # BLD AUTO: 0.86 10*3/MM3 (ref 0.7–3.1)
LYMPHOCYTES NFR BLD AUTO: 24.4 % (ref 19.6–45.3)
MCH RBC QN AUTO: 31 PG (ref 26.6–33)
MCHC RBC AUTO-ENTMCNC: 32.2 G/DL (ref 31.5–35.7)
MCV RBC AUTO: 96.2 FL (ref 79–97)
MONOCYTES # BLD AUTO: 0.41 10*3/MM3 (ref 0.1–0.9)
MONOCYTES NFR BLD AUTO: 11.6 % (ref 5–12)
NEUTROPHILS NFR BLD AUTO: 2.25 10*3/MM3 (ref 1.7–7)
NEUTROPHILS NFR BLD AUTO: 64 % (ref 42.7–76)
NRBC BLD AUTO-RTO: 0 /100 WBC (ref 0–0.2)
PLATELET # BLD AUTO: 154 10*3/MM3 (ref 140–450)
PMV BLD AUTO: 8.9 FL (ref 6–12)
POTASSIUM SERPL-SCNC: 4.3 MMOL/L (ref 3.5–5.2)
PROT SERPL-MCNC: 6.3 G/DL (ref 6–8.5)
RBC # BLD AUTO: 3.65 10*6/MM3 (ref 4.14–5.8)
SODIUM SERPL-SCNC: 136 MMOL/L (ref 136–145)
WBC NRBC COR # BLD: 3.52 10*3/MM3 (ref 3.4–10.8)

## 2023-07-31 PROCEDURE — 1160F RVW MEDS BY RX/DR IN RCRD: CPT | Performed by: NURSE PRACTITIONER

## 2023-07-31 PROCEDURE — G0498 CHEMO EXTEND IV INFUS W/PUMP: HCPCS

## 2023-07-31 PROCEDURE — 25010000002 LEUCOVORIN CALCIUM PER 50 MG: Performed by: NURSE PRACTITIONER

## 2023-07-31 PROCEDURE — 96415 CHEMO IV INFUSION ADDL HR: CPT

## 2023-07-31 PROCEDURE — 1159F MED LIST DOCD IN RCRD: CPT | Performed by: NURSE PRACTITIONER

## 2023-07-31 PROCEDURE — 3078F DIAST BP <80 MM HG: CPT | Performed by: NURSE PRACTITIONER

## 2023-07-31 PROCEDURE — 96367 TX/PROPH/DG ADDL SEQ IV INF: CPT

## 2023-07-31 PROCEDURE — 96413 CHEMO IV INFUSION 1 HR: CPT

## 2023-07-31 PROCEDURE — 1126F AMNT PAIN NOTED NONE PRSNT: CPT | Performed by: NURSE PRACTITIONER

## 2023-07-31 PROCEDURE — 96368 THER/DIAG CONCURRENT INF: CPT

## 2023-07-31 PROCEDURE — 80053 COMPREHEN METABOLIC PANEL: CPT

## 2023-07-31 PROCEDURE — 96411 CHEMO IV PUSH ADDL DRUG: CPT

## 2023-07-31 PROCEDURE — 85025 COMPLETE CBC W/AUTO DIFF WBC: CPT

## 2023-07-31 PROCEDURE — 25010000002 FLUOROURACIL PER 500 MG: Performed by: NURSE PRACTITIONER

## 2023-07-31 PROCEDURE — 3074F SYST BP LT 130 MM HG: CPT | Performed by: NURSE PRACTITIONER

## 2023-07-31 PROCEDURE — 99214 OFFICE O/P EST MOD 30 MIN: CPT | Performed by: NURSE PRACTITIONER

## 2023-07-31 PROCEDURE — 96416 CHEMO PROLONG INFUSE W/PUMP: CPT

## 2023-07-31 PROCEDURE — 25010000002 PALONOSETRON PER 25 MCG: Performed by: NURSE PRACTITIONER

## 2023-07-31 PROCEDURE — 25010000002 OXALIPLATIN PER 0.5 MG: Performed by: NURSE PRACTITIONER

## 2023-07-31 PROCEDURE — 25010000002 FOSAPREPITANT PER 1 MG: Performed by: NURSE PRACTITIONER

## 2023-07-31 PROCEDURE — 96375 TX/PRO/DX INJ NEW DRUG ADDON: CPT

## 2023-07-31 PROCEDURE — 25010000002 DEXAMETHASONE SODIUM PHOSPHATE 100 MG/10ML SOLUTION: Performed by: NURSE PRACTITIONER

## 2023-07-31 RX ORDER — FLUOROURACIL 50 MG/ML
400 INJECTION, SOLUTION INTRAVENOUS ONCE
Status: COMPLETED | OUTPATIENT
Start: 2023-07-31 | End: 2023-07-31

## 2023-07-31 RX ORDER — PALONOSETRON 0.05 MG/ML
0.25 INJECTION, SOLUTION INTRAVENOUS ONCE
Status: COMPLETED | OUTPATIENT
Start: 2023-07-31 | End: 2023-07-31

## 2023-07-31 RX ORDER — DEXTROSE MONOHYDRATE 50 MG/ML
250 INJECTION, SOLUTION INTRAVENOUS ONCE
Status: CANCELLED | OUTPATIENT
Start: 2023-07-31

## 2023-07-31 RX ORDER — DIPHENHYDRAMINE HYDROCHLORIDE 50 MG/ML
50 INJECTION INTRAMUSCULAR; INTRAVENOUS AS NEEDED
Status: CANCELLED | OUTPATIENT
Start: 2023-07-31

## 2023-07-31 RX ORDER — FAMOTIDINE 10 MG/ML
20 INJECTION, SOLUTION INTRAVENOUS AS NEEDED
Status: CANCELLED | OUTPATIENT
Start: 2023-07-31

## 2023-07-31 RX ORDER — DEXTROSE MONOHYDRATE 50 MG/ML
250 INJECTION, SOLUTION INTRAVENOUS ONCE
Status: COMPLETED | OUTPATIENT
Start: 2023-07-31 | End: 2023-07-31

## 2023-07-31 RX ORDER — FLUOROURACIL 50 MG/ML
400 INJECTION, SOLUTION INTRAVENOUS ONCE
Status: CANCELLED | OUTPATIENT
Start: 2023-07-31

## 2023-07-31 RX ORDER — PALONOSETRON 0.05 MG/ML
0.25 INJECTION, SOLUTION INTRAVENOUS ONCE
Status: CANCELLED | OUTPATIENT
Start: 2023-07-31

## 2023-07-31 RX ADMIN — LEUCOVORIN CALCIUM 750 MG: 350 INJECTION, POWDER, LYOPHILIZED, FOR SOLUTION INTRAMUSCULAR; INTRAVENOUS at 10:49

## 2023-07-31 RX ADMIN — FLUOROURACIL 4490 MG: 50 INJECTION, SOLUTION INTRAVENOUS at 12:53

## 2023-07-31 RX ADMIN — OXALIPLATIN 160 MG: 5 INJECTION, SOLUTION INTRAVENOUS at 10:49

## 2023-07-31 RX ADMIN — FOSAPREPITANT 100 ML: 150 INJECTION, POWDER, LYOPHILIZED, FOR SOLUTION INTRAVENOUS at 10:06

## 2023-07-31 RX ADMIN — PALONOSETRON 0.25 MG: 0.05 INJECTION, SOLUTION INTRAVENOUS at 10:35

## 2023-07-31 RX ADMIN — DEXTROSE MONOHYDRATE 250 ML: 50 INJECTION, SOLUTION INTRAVENOUS at 10:06

## 2023-07-31 RX ADMIN — DEXAMETHASONE SODIUM PHOSPHATE 12 MG: 10 INJECTION, SOLUTION INTRAMUSCULAR; INTRAVENOUS at 10:35

## 2023-07-31 RX ADMIN — FLUOROURACIL 750 MG: 50 INJECTION, SOLUTION INTRAVENOUS at 12:53

## 2023-08-02 ENCOUNTER — TELEPHONE (OUTPATIENT)
Dept: ONCOLOGY | Facility: CLINIC | Age: 73
End: 2023-08-02
Payer: MEDICARE

## 2023-08-02 ENCOUNTER — INFUSION (OUTPATIENT)
Dept: ONCOLOGY | Facility: HOSPITAL | Age: 73
End: 2023-08-02
Payer: MEDICARE

## 2023-08-02 DIAGNOSIS — C23 GALLBLADDER CANCER: Primary | ICD-10-CM

## 2023-08-02 DIAGNOSIS — Z45.9 ENCOUNTER FOR MANAGEMENT OF IMPLANTED DEVICE: ICD-10-CM

## 2023-08-02 PROCEDURE — 25010000002 HEPARIN LOCK FLUSH PER 10 UNITS: Performed by: INTERNAL MEDICINE

## 2023-08-02 RX ORDER — SODIUM CHLORIDE 0.9 % (FLUSH) 0.9 %
10 SYRINGE (ML) INJECTION AS NEEDED
Status: DISCONTINUED | OUTPATIENT
Start: 2023-08-02 | End: 2023-08-02 | Stop reason: HOSPADM

## 2023-08-02 RX ORDER — SODIUM CHLORIDE 0.9 % (FLUSH) 0.9 %
10 SYRINGE (ML) INJECTION AS NEEDED
OUTPATIENT
Start: 2023-08-02

## 2023-08-02 RX ORDER — HEPARIN SODIUM (PORCINE) LOCK FLUSH IV SOLN 100 UNIT/ML 100 UNIT/ML
500 SOLUTION INTRAVENOUS AS NEEDED
Status: DISCONTINUED | OUTPATIENT
Start: 2023-08-02 | End: 2023-08-02 | Stop reason: HOSPADM

## 2023-08-02 RX ORDER — HEPARIN SODIUM (PORCINE) LOCK FLUSH IV SOLN 100 UNIT/ML 100 UNIT/ML
500 SOLUTION INTRAVENOUS AS NEEDED
OUTPATIENT
Start: 2023-08-02

## 2023-08-02 RX ADMIN — Medication 500 UNITS: at 10:42

## 2023-08-02 RX ADMIN — Medication 10 ML: at 10:42

## 2023-08-04 ENCOUNTER — TELEPHONE (OUTPATIENT)
Dept: ONCOLOGY | Facility: CLINIC | Age: 73
End: 2023-08-04
Payer: MEDICARE

## 2023-08-04 ENCOUNTER — DOCUMENTATION (OUTPATIENT)
Dept: ONCOLOGY | Facility: CLINIC | Age: 73
End: 2023-08-04
Payer: MEDICARE

## 2023-08-04 RX ORDER — LACTULOSE 10 G/15ML
20 SOLUTION ORAL 2 TIMES DAILY PRN
Qty: 1800 ML | Refills: 0 | Status: SHIPPED | OUTPATIENT
Start: 2023-08-04 | End: 2023-09-03

## 2023-08-13 NOTE — PROGRESS NOTES
.     REASON FOR FOLLOWUP :   Squamous cell carcinoma of the gallbladder    HISTORY OF PRESENT ILLNESS:  The patient is a 73 y.o. year old male with the above-mentioned history today for lab review and evaluation prior to cycle 3 palliative FOLFOX.  He reports he is tolerating chemotherapy fairly well.  His greatest concern is significant constipation.  He did call the office following cycle 2 with reports of constipation.  He was utilizing at that time Senokot S2 tablets twice daily and MiraLAX.  He was instructed to discontinue MiraLAX and instead begin lactulose.  He reports the lactulose was initially helpful though then did not provide benefit and only resulted in significant abdominal cramping.  He reports he has not had a bowel movement in 3 days though does have gas.  He is currently utilizing Zofran 2-3 times daily for nausea.  He also reports worsening heartburn.  He has cold sensitivity secondary to oxaliplatin.  Approximately 3 days following 5-FU unhook he reports chills and achiness.    Past Medical History:   Diagnosis Date    Cancer     gallbladder    Gilbert syndrome     Hyperlipidemia     Hypertension      Past Surgical History:   Procedure Laterality Date    CHOLECYSTECTOMY WITH INTRAOPERATIVE CHOLANGIOGRAM N/A 02/11/2023    Procedure: CHOLECYSTECTOMY LAPAROSCOPIC INTRAOPERATIVE CHOLANGIOGRAM;  Surgeon: Vianey Horn MD;  Location: [facility] OR;  Service: General;  Laterality: N/A;    COLONOSCOPY      PILONIDAL CYST DRAINAGE      VENOUS ACCESS DEVICE (PORT) INSERTION N/A 3/9/2023    Procedure: INSERTION VENOUS ACCESS DEVICE;  Surgeon: Vianey Horn MD;  Location: [facility];  Service: General;  Laterality: N/A;       MEDICATIONS    Current Outpatient Medications:     apixaban (ELIQUIS) 5 MG tablet tablet, Take 1 tablet by mouth 2 (Two) Times a Day., Disp: 56 tablet, Rfl: 0    lactulose (CHRONULAC) 10 GM/15ML solution, Take 30 mL by mouth 2 (Two) Times a Day As Needed  (constipation) for up to 30 days., Disp: 1800 mL, Rfl: 0    lisinopril (PRINIVIL,ZESTRIL) 20 MG tablet, Take 1 tablet by mouth Daily., Disp: , Rfl:     OLANZapine (zyPREXA) 5 MG tablet, Take 1 tablet by mouth Every Night. Take on day 2, 3, and 4 and Days 9, 10, 11 after chemotherapy., Disp: 48 tablet, Rfl: 0    ondansetron (ZOFRAN) 8 MG tablet, Take 1 tablet by mouth 3 (Three) Times a Day As Needed for Nausea or Vomiting., Disp: 30 tablet, Rfl: 5    ondansetron (ZOFRAN) 8 MG tablet, Take 1 tablet by mouth 3 (Three) Times a Day As Needed for Nausea or Vomiting., Disp: 30 tablet, Rfl: 5    prochlorperazine (COMPAZINE) 10 MG tablet, Take 1 tablet by mouth Every 6 (Six) Hours As Needed for Nausea., Disp: 30 tablet, Rfl: 2  No current facility-administered medications for this visit.    Facility-Administered Medications Ordered in Other Visits:     dexAMETHasone (DECADRON) IVPB 12 mg, 12 mg, Intravenous, Once, Oeswein, Vivian Brina, APRN    dextrose (D5W) 5 % infusion 250 mL, 250 mL, Intravenous, Once, Oeswein, Vivian Brina, APRN    fluorouracil (ADRUCIL) 4,490 mg in sodium chloride 0.9 % 240 mL chemo infusion - FOR HOME USE, 2,400 mg/m2 (Treatment Plan Recorded), Intravenous, Once, Oeswein, Vivian Brina, APRN    fluorouracil (ADRUCIL) chemo injection 750 mg, 400 mg/m2 (Treatment Plan Recorded), Intravenous, Once, Oeswein, Vivian Brina, APRN    FOSAPREPITANT 150 MG/100ML NORMAL SALINE (CBC) IVPB 100 mL 100 mL, 150 mg, Intravenous, Once, Oeswein, Vivian Brina, APRN    leucovorin 750 mg in dextrose (D5W) 5 % 325 mL IVPB, 400 mg/m2 (Treatment Plan Recorded), Intravenous, Once, Oeswein, Vivian Brina, APRN    OXALIplatin (ELOXATIN) 160 mg in dextrose (D5W) 5 % 282 mL chemo IVPB, 85 mg/m2 (Treatment Plan Recorded), Intravenous, Once, Oeswein, Vivian Brina, APRN    palonosetron (ALOXI) injection 0.25 mg, 0.25 mg, Intravenous, Once, Vivian Valentine APRN    ALLERGIES:   No Known Allergies    SOCIAL HISTORY:  "      Social History     Socioeconomic History    Marital status:      Spouse name: Oly   Tobacco Use    Smoking status: Former     Types: Cigarettes   Vaping Use    Vaping Use: Never used   Substance and Sexual Activity    Alcohol use: Yes    Drug use: Never    Sexual activity: Defer         FAMILY HISTORY:  Family History   Problem Relation Age of Onset    Malig Hyperthermia Neg Hx        REVIEW OF SYSTEMS:  Review of Systems  ROS as per HPI         Vitals:    08/14/23 0935   BP: 124/78   Pulse: 73   Resp: 16   Temp: 98.2 øF (36.8 øC)   TempSrc: Temporal   SpO2: 99%   Weight: 76.3 kg (168 lb 3.2 oz)   Height: 173 cm (68.11\")   PainSc: 0-No pain           8/14/2023     9:39 AM   Current Status   ECOG score 0      Physical Exam  Vitals reviewed.   Constitutional:       General: He is not in acute distress.     Appearance: Normal appearance. He is well-developed.   HENT:      Head: Normocephalic and atraumatic.   Eyes:      Pupils: Pupils are equal, round, and reactive to light.   Cardiovascular:      Rate and Rhythm: Normal rate and regular rhythm.      Heart sounds: Normal heart sounds. No murmur heard.  Pulmonary:      Effort: Pulmonary effort is normal. No respiratory distress.      Breath sounds: Normal breath sounds. No wheezing, rhonchi or rales.   Abdominal:      General: Bowel sounds are normal. There is no distension.      Palpations: Abdomen is soft.      Tenderness: There is no abdominal tenderness.   Musculoskeletal:         General: Normal range of motion.      Cervical back: Normal range of motion.   Skin:     General: Skin is warm and dry.      Findings: No rash.   Neurological:      Mental Status: He is alert and oriented to person, place, and time.     I have reexamined the patient and the results are consistent with the previously documented exam. TY Cobos         RECENT LABS:  Results from last 7 days   Lab Units 08/14/23  0905   WBC 10*3/mm3 2.46*   NEUTROS ABS " 10*3/mm3 1.13*   HEMOGLOBIN g/dL 10.8*   HEMATOCRIT % 32.7*   PLATELETS 10*3/mm3 139*     Results from last 7 days   Lab Units 08/14/23  0905   SODIUM mmol/L 135   POTASSIUM mmol/L 4.0   CHLORIDE mmol/L 100   CO2 mmol/L 18.6*   BUN mg/dL 9   CREATININE mg/dL 1.02   CALCIUM mg/dL 8.9   ALBUMIN g/dL 3.7   BILIRUBIN mg/dL 0.4   ALK PHOS U/L 55   ALT (SGPT) U/L 16   AST (SGOT) U/L 44*   GLUCOSE mg/dL 112*           Assessment & Plan   Gallbladder cancer    High risk medication use    Portal vein thrombosis    Chemotherapy induced neutropenia        Guillermo HORTENCIA Salgado   *Squamous cell carcinoma of the gallbladder  Incidentally found on path review from cholecystectomy.  Due to symptomatic cholelithiasis, laparoscopic cholecystectomy 2/11/2023, Dr. 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  5 cm.  Invades perimuscular connective tissue.  Negative for lymph-vascular invasion.  Positive for focal perineural invasion.  Cystic duct margin negative.  Grade 1.  Margins were felt to be negative per pathologist.  Pathologist stage this as a [id number] cancer.  Dr. Horn stated the gallbladder was significantly inflamed and came out in fragments so she is not sure that staging is entirely clinically accurate.  She noted she also sent the patient to Dr. Alex Delgado of hepatobiliary surgery to determine if patient needs formal lymphadenectomy/liver resection.  Per NCCN guidelines: Needs multiphase abdomen/pelvis CT or MRI with IV contrast and CT chest with or without IV contrast.  Consider staging laparoscopy.  If felt to be resectable, then hepatic resection with lymphadenectomy with or without bile duct excision for malignant involvement.  If felt to be unresectable, then MSI/MMR testing, TMB testing, additional molecular testing to include an TRK.  Options include systemic therapy (preferred), clinical trial (preferred), palliative XRT, or best supportive care.  Per up-to-date guidelines, for a T2  cancer found incidentally after gallbladder surgery: Reexploration and extended cholecystectomy are also indicated.  Re-exploration identifies residual tumor in 40 to 75% of cases, and a high likelihood of liver involvement and jacki metastasis with T2 disease.  Re-resection significantly increases the likelihood of long-term DFS in patients with T2 disease.  In many series, 5-year OS increased from 25-40%, up to % with aggressive surgery.  Up-to-date authors recommend re-resection to resect at least a 2 cm margin of the liver bed and perform portal/hepatoduodenal ligament lymphadenectomy.  Up-to-date authors recommend adjuvant therapy for all patients with completely resected T1b or higher or node positive or margin positive gallbladder cancer.  Up-to-date authors note [facility] suggests adjuvant therapy for all patients with resected gallbladder cancer.  Up-to-date authors note there is no consensus on the optimal adjuvant therapy but they recommend 6 months of postoperative chemotherapy alone with Xeloda monotherapy for most patients.  They note if Xeloda is chosen they start treatment with no more than 1500 mg total dose twice daily.  They note an alternative approaches combining concomitant 5-FU based chemoradiotherapy with 4 months of systemic chemotherapy especially with patients with margin positive disease.  They note in this situation chemotherapy can be with Xeloda monotherapy or Xeloda plus Gemzar.  They note for patients who received chemo XRT plus adjuvant chemo, there is no consensus on sequencing.  In general they state it is preferable to start with chemotherapy first, complete 3 to 4 months of systemic exposure because this will avoid XRT for patients who are destined to develop early distant disease.  Dr. Alex Delgado, surgical oncology, states although LAD not read on MRI 2/10/2023, he sees bulky LAD on his imaging review.  He states this is currently not resectable due to the bulky LAD and  requests chemotherapy in hopes this will become resectable.  He requests avoiding XRT.  Per the Topaz 1 trial, plan Gemzar 1000 mg/m2 D1, D8 and cisplatin 25 mg/m2 D1, D8 and durvalumab 1500 mg flat dose.  Cycles every 3 weeks.  Up to 8 cycles.  This can be followed by durvalumab 1500 mg flat dose every 4 weeks.  Durvalumab was associated with a longer PFS and a higher objective response rate (26.7% versus 18.7% without durvalumab).  The Topaz 1 trial included previously untreated unresectable locally advanced or metastatic intrahepatic or extrahepatic cholangiocarcinoma or gallbladder cancer.  Also, cisplatin and Gemzar is a common regimen in squamous cell carcinoma of a more common primary (lung).  Therefore, I feel cisplatin Gemzar durvalumab is the best regimen for this currently unresectable squamous cell carcinoma of the gallbladder.  Dr. Delgado is recommended a prechemotherapy PET scan and then to check a PET scan again after roughly 2 months of chemotherapy to see if there has been enough improvement to see if this is resectable.  PET 3/2/2023: Intensely active tanner hepatis nodes, and ill-defined hypodensity in the liver parenchyma adjacent to gallbladder fossa, corresponding to areas of apparent masslike invasion seen on recent MRI, felt to be concerning for malignant invasion of the liver.  A few subcentimeter hypodense lesions in the liver too small to characterize.  Could not exclude peritoneal invasion/carcinomatosis.  Suggestion of a patent active thyroid nodule.  Radiologist recommended correlation with thyroid ultrasound.  Pretreatment CA 19-9 and CEA normal  3/13/2023: Begin Gemzar 1000 mg/m2 D1, D8 and cisplatin 25 mg/m2 D1, D8 and durvalumab 1500 mg flat dose.  Cycles every 3 weeks.  Up to 8 cycles.  This can be followed by durvalumab 1500 mg flat dose every 4 weeks   (if this cannot be resected, then the Topaz 1 trial continue durvalumab until progression or toxicity.  If this can be resected,  likely would not continue single agent durvalumab).  3/20/2023, C1 D8: AST 86, .  Stop atorvastatin that was resumed at discharge.  Hold Gemzar.  Give cisplatin.  3/27/2023: AST 25, ALT 57.  With improvement in LFTs after holding Gemzar, thought to Gemzar likely the cause.  However:  4/3/23, C2 D1: AST up to 116, ALT up to 213.  Held Gemzar again (per DrMarcial #2).  The thought was if further elevation in LFTs may need to consider steroids for durvalumab liver toxicity.  4/10/23, C2 D8: AST 18, ALT 49.  Gemzar given at 25% dose reduction.  5/1/2023: C3d 8, one-time dose reduction of Gemzar by another 25% due to .  Maintain same dose cisplatin.  Add Neulasta if insurance will allow  (Was having mild increase in tinnitus but this is tolerable.  He understands cisplatin can significantly and permanently worsen tinnitus.  He denies hearing loss.  He understands this risk and wants to continue cisplatin at same dose.  PET 5/8/2023 (after C3d8): A few new pulmonary nodules.  Cluster of index nodules, RLL, up to 0.8 cm.  Index sub-6 mm nodule RUL also new.  Radiologist notes the pulmonary nodules are nonspecific but in context are concerning for lung metastasis.  Gastrohepatic node 1.4 cm from 0.8 cm with SUV 6.7, from 3.9.  Raven hepatic node 2.7 cm, unchanged in size, but SUV 8.8, from 7.4.  The liver along the gallbladder fossa with SUV 7.8, from 9.5, the size of the hypermetabolic activity is unchanged, 5.1 x 3.5 cm.  Smaller area of uptake above that of the adjacent hepatic parenchyma, SUV 3, as before.  New or more defined soft tissue density nodules anterior inferior to the central liver.  Index lesion inferior to the gallbladder fossa now much more defined, 1.4 x 0.9 cm with SUV 3.3.  Index lesion along anterior lateral aspect of central liver new, 0.7 cm.  Radiologist notes this suggests peritoneal carcinomatosis.  This seems mostly consistent with progression.  NCCN options include FOLFOX, Gemzar  Abraxane, FOLFIRI, Regorafenib.  FOLFOX is preferred  5/12/2023: Dr. Alex Delgado agrees with me: PET is consistent with progression.  He is no longer felt to be a surgical candidate.  Patient understands this is no longer felt to be curable.  Progressed on Gemzar cisplatin durvalumab.  Therefore, per NCCN guidelines switch to FOLFOX.  He requests 2 weeks break before beginning FOLFOX to try and improve the fatigue that worsened towards the end of his last chemo regimen.  5/16/2023: Patient wants a break from chemotherapy and does not want to begin FOLFOX.  He would like observation and 1 significant growth is seen on scans he will consider FOLFIRI.  He would like to avoid Oxaliplatin because he is worried about developing neuropathy.  CT 7/10/2023: Progression.  Raven hepatis mass larger, causing common hepatic duct obstruction with mild to moderate by lobar intrahepatic biliary dilation.  Thrombus main portal vein extending to the splenoportal confluence.  Discussed with radiologist, she thinks this is direct extension of tumor (tumor thrombus).  7/17/2023: Discussed options including comfort care with hospice versus chemotherapy.  I explained NCCN guidelines prefer FOLFOX.  I explained since no response to initial chemo, and this tumor is typically not very responsive to chemo, the chances of response to further chemo is low.  After long discussion, including his wife, he has decided to try FOLFOX.  He states he will be quick to stop it if he has too many side effects  7/31/2023 cycle 2 FOLFOX, overall tolerating well so far.  Proceed today, 8/14/2023 with cycle 3 FOLFOX.    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  7/31/2023 continues on Eliquis 5 mg twice daily, tolerating well.    *Suggestion of PET active thyroid nodule on PET 3/2/2023.  PET 3/2/2023: Radiologist recommended thyroid  ultrasound.    3/7/2023: Ordered thyroid ultrasound (but patient understands even if something looks concerning, addressed the gallbladder cancer first and only work on the thyroid nodule if gallbladder cancer treatment has completed and is felt to be in remission  Thyroid ultrasound 3/8/2023: TI-RADS 3.  Radiologist stated consider 1 year follow-up if clinically indicated.  5/12/2023: Gallbladder cancer is now metastatic (and noncurable).  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  Did not worsen.  No issues otherwise.  7/14/2023: Planning FOLFOX.  He understands this is expected to worsen his neuropathy  8/14/2023 denies issues with worsening neuropathy.    *Borderline neutropenia  8/14/2023, WBC 0.54, ANC 1.13  Counts are acceptable for treatment today though we did discuss WBC and ANC trending downward  We will submit for Neulasta on body to utilize with future cycles of chemotherapy to avoid delays  The patient understands to avoid crowds, good hand hygiene and call for fevers or chills, signs or symptoms of infection    *Constipation  Previously utilizing Senokot S2 tablets twice daily and MiraLAX twice daily  Lactulose was added though this resulted in significant abdominal constipation  We discussed today discontinuing lactulose, resuming Senokot 2 tablets twice daily and MiraLAX twice daily and utilizing milk of magnesium.  We will also discontinue Zofran as this is likely exacerbating his constipation and instead utilize prochlorperazine for breakthrough nausea        Plan  Proceed today with cycle 3 palliative FOLFOX which is continued every 2 weeks  Discontinue as needed Zofran (exacerbating constipation) and begin prochlorperazine 10 mg every 6 hours as needed for breakthrough nausea  Discontinue lactulose due to abdominal cramping  Resume Senokot 2 tablets  twice daily and MiraLAX twice daily with addition of milk of magnesium as needed.  Continue Eliquis 5 mg twice daily  Return Wednesday, 8/16/2023 for 5-FU unhook and 1 L normal saline  We will submit to insurance to utilize Neulasta on body for future cycles of chemotherapy due to neutropenia  Patient is scheduled with MD or NP with FOLFOX every 2 weeks, scheduled through C5, 9/11/2023.    1 week before 9/11/2023: CT CAP with contrast, CBC, CMP.  (He states he will be quick to stop chemotherapy if he has significant side effects).    Patient is on a high risk medication requiring close monitoring for toxicity.  Today's plan of care to add growth factor for future cycles of chemotherapy was discussed and reviewed with Dr. Chaney who is in agreement    Vivian Valentine, TY  08/14/2023

## 2023-08-14 ENCOUNTER — DOCUMENTATION (OUTPATIENT)
Dept: ONCOLOGY | Facility: CLINIC | Age: 73
End: 2023-08-14
Payer: MEDICARE

## 2023-08-14 ENCOUNTER — INFUSION (OUTPATIENT)
Dept: ONCOLOGY | Facility: HOSPITAL | Age: 73
End: 2023-08-14
Payer: MEDICARE

## 2023-08-14 ENCOUNTER — NUTRITION (OUTPATIENT)
Dept: OTHER | Facility: HOSPITAL | Age: 73
End: 2023-08-14
Payer: MEDICARE

## 2023-08-14 ENCOUNTER — OFFICE VISIT (OUTPATIENT)
Dept: ONCOLOGY | Facility: CLINIC | Age: 73
End: 2023-08-14
Payer: MEDICARE

## 2023-08-14 VITALS
RESPIRATION RATE: 16 BRPM | HEIGHT: 68 IN | SYSTOLIC BLOOD PRESSURE: 124 MMHG | DIASTOLIC BLOOD PRESSURE: 78 MMHG | WEIGHT: 168.2 LBS | OXYGEN SATURATION: 99 % | BODY MASS INDEX: 25.49 KG/M2 | TEMPERATURE: 98.2 F | HEART RATE: 73 BPM

## 2023-08-14 DIAGNOSIS — C23 GALLBLADDER CANCER: ICD-10-CM

## 2023-08-14 DIAGNOSIS — I81 PORTAL VEIN THROMBOSIS: ICD-10-CM

## 2023-08-14 DIAGNOSIS — Z79.899 HIGH RISK MEDICATION USE: ICD-10-CM

## 2023-08-14 DIAGNOSIS — C23 GALLBLADDER CANCER: Primary | ICD-10-CM

## 2023-08-14 DIAGNOSIS — T45.1X5A CHEMOTHERAPY INDUCED NEUTROPENIA: ICD-10-CM

## 2023-08-14 DIAGNOSIS — D70.1 CHEMOTHERAPY INDUCED NEUTROPENIA: ICD-10-CM

## 2023-08-14 LAB
ALBUMIN SERPL-MCNC: 3.7 G/DL (ref 3.5–5.2)
ALBUMIN/GLOB SERPL: 1.3 G/DL
ALP SERPL-CCNC: 55 U/L (ref 39–117)
ALT SERPL W P-5'-P-CCNC: 16 U/L (ref 1–41)
ANION GAP SERPL CALCULATED.3IONS-SCNC: 16.4 MMOL/L (ref 5–15)
AST SERPL-CCNC: 44 U/L (ref 1–40)
BASOPHILS # BLD AUTO: 0 10*3/MM3 (ref 0–0.2)
BASOPHILS NFR BLD AUTO: 0 % (ref 0–1.5)
BILIRUB SERPL-MCNC: 0.4 MG/DL (ref 0–1.2)
BUN SERPL-MCNC: 9 MG/DL (ref 8–23)
BUN/CREAT SERPL: 8.8 (ref 7–25)
CALCIUM SPEC-SCNC: 8.9 MG/DL (ref 8.6–10.5)
CHLORIDE SERPL-SCNC: 100 MMOL/L (ref 98–107)
CO2 SERPL-SCNC: 18.6 MMOL/L (ref 22–29)
CREAT SERPL-MCNC: 1.02 MG/DL (ref 0.7–1.3)
DEPRECATED RDW RBC AUTO: 48 FL (ref 37–54)
EGFRCR SERPLBLD CKD-EPI 2021: 76.6 ML/MIN/1.73
EOSINOPHIL # BLD AUTO: 0 10*3/MM3 (ref 0–0.4)
EOSINOPHIL NFR BLD AUTO: 0 % (ref 0.3–6.2)
ERYTHROCYTE [DISTWIDTH] IN BLOOD BY AUTOMATED COUNT: 14.5 % (ref 12.3–15.4)
GLOBULIN UR ELPH-MCNC: 2.8 GM/DL
GLUCOSE SERPL-MCNC: 112 MG/DL (ref 65–99)
HCT VFR BLD AUTO: 32.7 % (ref 37.5–51)
HGB BLD-MCNC: 10.8 G/DL (ref 13–17.7)
IMM GRANULOCYTES # BLD AUTO: 0.01 10*3/MM3 (ref 0–0.05)
IMM GRANULOCYTES NFR BLD AUTO: 0.4 % (ref 0–0.5)
LYMPHOCYTES # BLD AUTO: 0.93 10*3/MM3 (ref 0.7–3.1)
LYMPHOCYTES NFR BLD AUTO: 37.8 % (ref 19.6–45.3)
MCH RBC QN AUTO: 31.3 PG (ref 26.6–33)
MCHC RBC AUTO-ENTMCNC: 33 G/DL (ref 31.5–35.7)
MCV RBC AUTO: 94.8 FL (ref 79–97)
MONOCYTES # BLD AUTO: 0.39 10*3/MM3 (ref 0.1–0.9)
MONOCYTES NFR BLD AUTO: 15.9 % (ref 5–12)
NEUTROPHILS NFR BLD AUTO: 1.13 10*3/MM3 (ref 1.7–7)
NEUTROPHILS NFR BLD AUTO: 45.9 % (ref 42.7–76)
NRBC BLD AUTO-RTO: 0 /100 WBC (ref 0–0.2)
PLATELET # BLD AUTO: 139 10*3/MM3 (ref 140–450)
PMV BLD AUTO: 9.1 FL (ref 6–12)
POTASSIUM SERPL-SCNC: 4 MMOL/L (ref 3.5–4.7)
PROT SERPL-MCNC: 6.5 G/DL (ref 6–8.5)
RBC # BLD AUTO: 3.45 10*6/MM3 (ref 4.14–5.8)
SODIUM SERPL-SCNC: 135 MMOL/L (ref 134–145)
WBC NRBC COR # BLD: 2.46 10*3/MM3 (ref 3.4–10.8)

## 2023-08-14 PROCEDURE — 96375 TX/PRO/DX INJ NEW DRUG ADDON: CPT

## 2023-08-14 PROCEDURE — G0498 CHEMO EXTEND IV INFUS W/PUMP: HCPCS

## 2023-08-14 PROCEDURE — 25010000002 DEXAMETHASONE SODIUM PHOSPHATE 100 MG/10ML SOLUTION: Performed by: NURSE PRACTITIONER

## 2023-08-14 PROCEDURE — 25010000002 LEUCOVORIN 500 MG RECONSTITUTED SOLUTION 1 EACH VIAL: Performed by: NURSE PRACTITIONER

## 2023-08-14 PROCEDURE — 96415 CHEMO IV INFUSION ADDL HR: CPT

## 2023-08-14 PROCEDURE — 85025 COMPLETE CBC W/AUTO DIFF WBC: CPT

## 2023-08-14 PROCEDURE — 96411 CHEMO IV PUSH ADDL DRUG: CPT

## 2023-08-14 PROCEDURE — 25010000002 PALONOSETRON PER 25 MCG: Performed by: NURSE PRACTITIONER

## 2023-08-14 PROCEDURE — 96416 CHEMO PROLONG INFUSE W/PUMP: CPT

## 2023-08-14 PROCEDURE — 25010000002 OXALIPLATIN PER 0.5 MG: Performed by: NURSE PRACTITIONER

## 2023-08-14 PROCEDURE — 96367 TX/PROPH/DG ADDL SEQ IV INF: CPT

## 2023-08-14 PROCEDURE — 25010000002 FOSAPREPITANT PER 1 MG: Performed by: NURSE PRACTITIONER

## 2023-08-14 PROCEDURE — 25010000002 FLUOROURACIL PER 500 MG: Performed by: NURSE PRACTITIONER

## 2023-08-14 PROCEDURE — 96368 THER/DIAG CONCURRENT INF: CPT

## 2023-08-14 PROCEDURE — 96413 CHEMO IV INFUSION 1 HR: CPT

## 2023-08-14 PROCEDURE — 80053 COMPREHEN METABOLIC PANEL: CPT

## 2023-08-14 RX ORDER — DIPHENHYDRAMINE HYDROCHLORIDE 50 MG/ML
50 INJECTION INTRAMUSCULAR; INTRAVENOUS AS NEEDED
Status: CANCELLED | OUTPATIENT
Start: 2023-08-14

## 2023-08-14 RX ORDER — PALONOSETRON 0.05 MG/ML
0.25 INJECTION, SOLUTION INTRAVENOUS ONCE
Status: COMPLETED | OUTPATIENT
Start: 2023-08-14 | End: 2023-08-14

## 2023-08-14 RX ORDER — FLUOROURACIL 50 MG/ML
400 INJECTION, SOLUTION INTRAVENOUS ONCE
Status: COMPLETED | OUTPATIENT
Start: 2023-08-14 | End: 2023-08-14

## 2023-08-14 RX ORDER — PROCHLORPERAZINE MALEATE 10 MG
10 TABLET ORAL EVERY 6 HOURS PRN
Qty: 30 TABLET | Refills: 2 | Status: SHIPPED | OUTPATIENT
Start: 2023-08-14

## 2023-08-14 RX ORDER — FLUOROURACIL 50 MG/ML
400 INJECTION, SOLUTION INTRAVENOUS ONCE
Status: CANCELLED | OUTPATIENT
Start: 2023-08-14

## 2023-08-14 RX ORDER — DEXTROSE MONOHYDRATE 50 MG/ML
250 INJECTION, SOLUTION INTRAVENOUS ONCE
Status: CANCELLED | OUTPATIENT
Start: 2023-08-14

## 2023-08-14 RX ORDER — DEXTROSE MONOHYDRATE 50 MG/ML
250 INJECTION, SOLUTION INTRAVENOUS ONCE
Status: COMPLETED | OUTPATIENT
Start: 2023-08-14 | End: 2023-08-14

## 2023-08-14 RX ORDER — FAMOTIDINE 10 MG/ML
20 INJECTION, SOLUTION INTRAVENOUS AS NEEDED
Status: CANCELLED | OUTPATIENT
Start: 2023-08-14

## 2023-08-14 RX ORDER — PALONOSETRON 0.05 MG/ML
0.25 INJECTION, SOLUTION INTRAVENOUS ONCE
Status: CANCELLED | OUTPATIENT
Start: 2023-08-14

## 2023-08-14 RX ADMIN — FLUOROURACIL 4490 MG: 50 INJECTION, SOLUTION INTRAVENOUS at 13:41

## 2023-08-14 RX ADMIN — FOSAPREPITANT DIMEGLUMINE 100 ML: 150 INJECTION, POWDER, LYOPHILIZED, FOR SOLUTION INTRAVENOUS at 10:24

## 2023-08-14 RX ADMIN — OXALIPLATIN 160 MG: 5 INJECTION, SOLUTION INTRAVENOUS at 11:15

## 2023-08-14 RX ADMIN — FLUOROURACIL 750 MG: 50 INJECTION, SOLUTION INTRAVENOUS at 13:40

## 2023-08-14 RX ADMIN — PALONOSETRON 0.25 MG: 0.05 INJECTION, SOLUTION INTRAVENOUS at 10:20

## 2023-08-14 RX ADMIN — DEXAMETHASONE SODIUM PHOSPHATE 12 MG: 10 INJECTION, SOLUTION INTRAMUSCULAR; INTRAVENOUS at 10:56

## 2023-08-14 RX ADMIN — DEXTROSE MONOHYDRATE 250 ML: 50 INJECTION, SOLUTION INTRAVENOUS at 10:20

## 2023-08-14 RX ADMIN — LEUCOVORIN CALCIUM 750 MG: 500 INJECTION, POWDER, LYOPHILIZED, FOR SOLUTION INTRAMUSCULAR; INTRAVENOUS at 11:15

## 2023-08-14 NOTE — NURSING NOTE
Lab Results   Component Value Date    WBC 2.46 (L) 08/14/2023    HGB 10.8 (L) 08/14/2023    HCT 32.7 (L) 08/14/2023    MCV 94.8 08/14/2023     (L) 08/14/2023     Lab Results   Component Value Date    NEUTROABS 1.13 (L) 08/14/2023     S/W [place] okay to treat.

## 2023-08-14 NOTE — PROGRESS NOTES
"OUTPATIENT ONCOLOGY NUTRITION ASSESSMENT    Patient Name: Guillermo Salgado  YOB: 1950  MRN: [id number]  Assessment Date: 8/14/2023    COMMENTS: Met with pt and spouse in infusion area, pt receiving FOLFOX today. Pt reports a good appetite, has not had any issues with eating or drinking. Pt denies any side effects that impact his PO intake, only issue is constipation, MD is making some med changes to help. Pt reports he is golfing 2x/week on his chemo off weeks. Overall, pt is doing well in regards to his nutrition, up 4#.         Reason for Assessment Follow up     Diagnosis/Problem   SCC gallbladder   Treatment Plan Chemotherapy; FOLFOX   Frequency Q 2 weeks    Goal of cancer treatment Palliative   Comments:        Encounter Information        Nutrition/Diet History:  Patient has a good appetite, is eating well    Oral Nutrition Supplements: None at this time    Factors/Symptoms Affecting Intake: No factors at this time   Comments:      Medical/Surgical History Past Medical History:   Diagnosis Date    Cancer     gallbladder    Gilbert syndrome     Hyperlipidemia     Hypertension      Past Surgical History:   Procedure Laterality Date    CHOLECYSTECTOMY WITH INTRAOPERATIVE CHOLANGIOGRAM N/A 02/11/2023    Procedure: CHOLECYSTECTOMY LAPAROSCOPIC INTRAOPERATIVE CHOLANGIOGRAM;  Surgeon: Vianey Horn MD;  Location: [facility];  Service: General;  Laterality: N/A;    COLONOSCOPY      PILONIDAL CYST DRAINAGE      VENOUS ACCESS DEVICE (PORT) INSERTION N/A 3/9/2023    Procedure: INSERTION VENOUS ACCESS DEVICE;  Surgeon: Vianey Horn MD;  Location: [facility];  Service: General;  Laterality: N/A;        Anthropometrics        Current Height Ht Readings from Last 1 Encounters:   08/14/23 173 cm (68.11\")      Current Weight Wt Readings from Last 1 Encounters:   08/14/23 76.3 kg (168 lb 3.2 oz)      BMI  25.5   Ideal Body Weight (IBW) 154#   Usual Body Weight (UBW) ~155-165#   Weight Change/Trend " Stable, Gain; +4#/2.4% x 2 weeks    Weight History Wt Readings from Last 30 Encounters:   08/14/23 0935 76.3 kg (168 lb 3.2 oz)   07/31/23 0933 74.5 kg (164 lb 4.8 oz)   07/17/23 1101 74.7 kg (164 lb 9.6 oz)   07/14/23 1455 73.7 kg (162 lb 8 oz)   05/16/23 1424 73.6 kg (162 lb 4.8 oz)   05/12/23 1146 73.5 kg (162 lb)   05/01/23 0748 71.7 kg (158 lb)   04/24/23 1401 73.8 kg (162 lb 9.6 oz)   04/24/23 0808 71.9 kg (158 lb 9.6 oz)   04/10/23 1404 72.2 kg (159 lb 3.2 oz)   04/10/23 0853 71.3 kg (157 lb 1.6 oz)   04/03/23 1422 71.7 kg (158 lb)   04/03/23 0828 71.2 kg (156 lb 14.4 oz)   03/20/23 1317 71.2 kg (157 lb)   03/20/23 0840 70.7 kg (155 lb 12.8 oz)   03/13/23 1501 72.8 kg (160 lb 9.6 oz)   03/13/23 0823 71.9 kg (158 lb 8 oz)   03/08/23 1250 73.1 kg (161 lb 1.6 oz)   03/07/23 1145 72.9 kg (160 lb 12.8 oz)   02/28/23 0954 72.5 kg (159 lb 14.4 oz)   02/10/23 1510 76 kg (167 lb 8.8 oz)   02/09/23 1631 76.2 kg (168 lb)   02/09/23 1248 77.1 kg (170 lb)   02/10/23 0647 76.2 kg (168 lb)          Medications           Current medications: OLANZapine, apixaban, lactulose, lisinopril, ondansetron, and prochlorperazine     Tests/Procedures        Tests/Procedures Chemotherapy     Labs       Pertinent Labs    Results from last 7 days   Lab Units 08/14/23  0905   SODIUM mmol/L 135   POTASSIUM mmol/L 4.0   CHLORIDE mmol/L 100   CO2 mmol/L 18.6*   BUN mg/dL 9   CREATININE mg/dL 1.02   CALCIUM mg/dL 8.9   BILIRUBIN mg/dL 0.4   ALK PHOS U/L 55   ALT (SGPT) U/L 16   AST (SGOT) U/L 44*   GLUCOSE mg/dL 112*     Results from last 7 days   Lab Units 08/14/23  0905   HEMOGLOBIN g/dL 10.8*   HEMATOCRIT % 32.7*   WBC 10*3/mm3 2.46*   ALBUMIN g/dL 3.7     Results from last 7 days   Lab Units 08/14/23  0905   PLATELETS 10*3/mm3 139*     No results found for: COVID19  No results found for: HGBA1C       Physical Findings        Physical Appearance alert, oriented     Edema  no edema   Gastrointestinal constipation   Tubes/Drains implantable  "port   Oral/Mouth Cavity WNL     Estimated/Assessed Needs        Energy Requirements    Height for Calculation   68\"   Weight for Calculation 74.7 kg   Method for Estimation  25 kcal/kg, 30 kcal/kg   EST Needs (kcal/day) 2437-7300 kcal/day       Protein Requirements    Weight for Calculation 74.7 kg   EST Protein Needs (g/kg) 1.0 gm/kg   EST Daily Needs (g/day) 75 g/day       Fluid Requirements     Method for Estimation 1 mL/kcal    Estimated Needs (mL/day) 2000 mL/day           PES STATEMENT / NUTRITION DIAGNOSIS      Nutrition Dx Problem Problem:    No Nutrition Diagnosis at this Time and Nutrition Appropriate for Condition at this Time     NUTRITION INTERVENTION / PLAN OF CARE      Intervention Goal(s) Maintain nutrition status, Provide information, Meet estimated needs, Disease management/therapy, Tolerate PO , Maintain intake, Continue positive trend, Maintain weight, and No significant weight loss         RD Intervention/Action Encouraged intake, Follow Tx progress         Recommendations:       PO Diet Continue current       Supplements Monitor for need       Snacks       Other          Monitor/Evaluation PO intake, Weight   Education Will provide eduction as needed   --    RD to follow per protocol.    Electronically signed by:  Vicky Salinas RD  08/14/23 11:20 EDT    "

## 2023-08-16 ENCOUNTER — INFUSION (OUTPATIENT)
Dept: ONCOLOGY | Facility: HOSPITAL | Age: 73
End: 2023-08-16
Payer: MEDICARE

## 2023-08-16 VITALS
RESPIRATION RATE: 16 BRPM | BODY MASS INDEX: 25.76 KG/M2 | TEMPERATURE: 98 F | WEIGHT: 170 LBS | OXYGEN SATURATION: 100 % | SYSTOLIC BLOOD PRESSURE: 137 MMHG | DIASTOLIC BLOOD PRESSURE: 82 MMHG | HEART RATE: 71 BPM

## 2023-08-16 DIAGNOSIS — C23 GALLBLADDER CANCER: Primary | ICD-10-CM

## 2023-08-16 DIAGNOSIS — Z45.9 ENCOUNTER FOR MANAGEMENT OF IMPLANTED DEVICE: ICD-10-CM

## 2023-08-16 PROCEDURE — 96360 HYDRATION IV INFUSION INIT: CPT

## 2023-08-16 PROCEDURE — 25010000002 HEPARIN LOCK FLUSH PER 10 UNITS: Performed by: INTERNAL MEDICINE

## 2023-08-16 RX ORDER — SODIUM CHLORIDE 9 MG/ML
1000 INJECTION, SOLUTION INTRAVENOUS CONTINUOUS
Status: ACTIVE | OUTPATIENT
Start: 2023-08-16 | End: 2023-08-16

## 2023-08-16 RX ORDER — SODIUM CHLORIDE 0.9 % (FLUSH) 0.9 %
10 SYRINGE (ML) INJECTION AS NEEDED
Status: CANCELLED | OUTPATIENT
Start: 2023-08-16

## 2023-08-16 RX ORDER — SODIUM CHLORIDE 0.9 % (FLUSH) 0.9 %
10 SYRINGE (ML) INJECTION AS NEEDED
Status: DISCONTINUED | OUTPATIENT
Start: 2023-08-16 | End: 2023-08-16 | Stop reason: HOSPADM

## 2023-08-16 RX ORDER — HEPARIN SODIUM (PORCINE) LOCK FLUSH IV SOLN 100 UNIT/ML 100 UNIT/ML
500 SOLUTION INTRAVENOUS AS NEEDED
OUTPATIENT
Start: 2023-08-16

## 2023-08-16 RX ORDER — HEPARIN SODIUM (PORCINE) LOCK FLUSH IV SOLN 100 UNIT/ML 100 UNIT/ML
500 SOLUTION INTRAVENOUS AS NEEDED
Status: DISCONTINUED | OUTPATIENT
Start: 2023-08-16 | End: 2023-08-16 | Stop reason: HOSPADM

## 2023-08-16 RX ORDER — SODIUM CHLORIDE 0.9 % (FLUSH) 0.9 %
10 SYRINGE (ML) INJECTION AS NEEDED
Status: DISCONTINUED | OUTPATIENT
Start: 2023-08-16 | End: 2023-08-17 | Stop reason: HOSPADM

## 2023-08-16 RX ORDER — SODIUM CHLORIDE 0.9 % (FLUSH) 0.9 %
10 SYRINGE (ML) INJECTION AS NEEDED
OUTPATIENT
Start: 2023-08-16

## 2023-08-16 RX ORDER — HEPARIN SODIUM (PORCINE) LOCK FLUSH IV SOLN 100 UNIT/ML 100 UNIT/ML
500 SOLUTION INTRAVENOUS AS NEEDED
Status: CANCELLED | OUTPATIENT
Start: 2023-08-16

## 2023-08-16 RX ORDER — HEPARIN SODIUM (PORCINE) LOCK FLUSH IV SOLN 100 UNIT/ML 100 UNIT/ML
500 SOLUTION INTRAVENOUS AS NEEDED
Status: DISCONTINUED | OUTPATIENT
Start: 2023-08-16 | End: 2023-08-17 | Stop reason: HOSPADM

## 2023-08-16 RX ADMIN — Medication 10 ML: at 13:19

## 2023-08-16 RX ADMIN — SODIUM CHLORIDE 1000 ML: 9 INJECTION, SOLUTION INTRAVENOUS at 12:14

## 2023-08-16 RX ADMIN — Medication 500 UNITS: at 13:19

## 2023-08-16 NOTE — NURSING NOTE
Pt arrived for 5fu chemo ball unhook with IV fluids. He states he has still not had a BM in 5 days. When he was here on Monday for NP visit and chemo, this was addressed. [facility] instructed pt to take Senna- S BID, milk of Mag BID and miralax BID. Pt has been doing this for 2 days with no BM.  He is still passing gas and his abd is soft and non tender.  His food intake is down some, but he did have oatmeal this morning. He is hesitant to eat too much with not having BM for 5 days.      He was previously on lactulose, but he didn't see good results with that and it caused him cramping.    Discussed with [facility].  Pt can increase the senna-S to 2 tabs TID instead of 2 tabs BID like he has been. Continue with BID milk of mag and miralax.  Increase po fluids as well.    Informed pt and he v/u.  He will call us if he still doesn't have relief by Friday or if he has other questions or concerns.

## 2023-08-17 ENCOUNTER — TELEPHONE (OUTPATIENT)
Dept: ONCOLOGY | Facility: CLINIC | Age: 73
End: 2023-08-17
Payer: MEDICARE

## 2023-08-17 NOTE — TELEPHONE ENCOUNTER
Oftentimes the muscle cramps are a difficult thing as well.  What I would recommend for those is to try to stay really well-hydrated with electrolyte drinks and also especially if it is muscle cramps in the calves I think doing things to make the calf muscles healthier can help.  This would include calf stretching exercises and foam rolling. I would recommend he get a foam roller and use it 2 or 3 times per day.  Tell him it is expected to hurt quite a bit but that means it is doing its job.  Know that using a foam roller for the calves is something some people cannot mechanically do very well because it does require some upper body strength and mobility up and down.  A foam roller is best but if he cannot use a foam roller he could also apply pressure using a ball such as a tennis ball, field hockey ball, lacrosse ball, or something like that with the same concept.      Relayed the information from Dr. Benedict to patient and he v/u.

## 2023-08-17 NOTE — TELEPHONE ENCOUNTER
Provider: code  Caller: patient   Relationship to Patient:   Call Back Phone Number: [phone]  Reason for Call: patient is having side effects from chemo          [FreeTextEntry1] : Patient presents after her GI doctor sent to us. No pain or hematuria or dysuria. no weight loss. no change in appetite or bone pain.

## 2023-08-17 NOTE — TELEPHONE ENCOUNTER
Patient is calling in again after his latest round of Folfox (8/14/23). He states that this round his symptoms are different and he has pretty bad muscle spasms in his calves and he states its better when he moves around, but at night they are the worst. He denies redness or warmth and wanted to see if we recommended anything. He states that he also wanted to let Dr. Benedict know that he is very hoarse compared to normal and denies any congestion or cough and just wanted to make you aware. I told him I would talk with Dr. Bendeict and get back with him and he v/u.

## 2023-08-18 ENCOUNTER — TELEPHONE (OUTPATIENT)
Dept: ONCOLOGY | Facility: CLINIC | Age: 73
End: 2023-08-18
Payer: MEDICARE

## 2023-08-18 NOTE — TELEPHONE ENCOUNTER
Provider: Dr Benedict  Caller: Guillermo Salgado  Relationship to Patient: Self  Call Back Phone Number: [phone]  Reason for Call: Blood count

## 2023-08-18 NOTE — TELEPHONE ENCOUNTER
Call back to Mr. Salgado to see what his question is about his blood work.  Patient states he was told at his treatment on Monday that his blood work was trending down.  He was thinking he may need to have his blood checked again.  Asked him if he is feeling worse or having any concerning symptoms.  Patient states he was actually feeling better today.    Reviewed with NP, Vivian WHITLEY, and she did not think it is necessary for him to have blood drawn at this time.  Called him back and let him know. Patient verbalized understanding.

## 2023-08-21 ENCOUNTER — DOCUMENTATION (OUTPATIENT)
Dept: ONCOLOGY | Facility: CLINIC | Age: 73
End: 2023-08-21
Payer: MEDICARE

## 2023-08-21 ENCOUNTER — TELEPHONE (OUTPATIENT)
Dept: ONCOLOGY | Facility: CLINIC | Age: 73
End: 2023-08-21
Payer: MEDICARE

## 2023-08-21 NOTE — PROGRESS NOTES
Yes.  Please call in Magic mouthwash for him.  I think his decision to stop active therapy is very reasonable in this situation.  ===View-only below this line===  ----- Message -----  From: Mary Morton RN  Sent: 8/21/2023   8:34 AM EDT  To: MD Dr. Jak Srivastava II,     Patient called in this morning stating that he is stopping treatment because he is not bouncing back and he is tired of being sick. He states he wanted to keep the 9/7 scan appointment and see you 9/11 after for review. He also called in stating he now has mouth sores that wont go away. I didn't know if you would be okay with me calling in magic mouthwash for him?    Thank you  ----- Message -----  From: Myranda Samano  Sent: 8/21/2023   8:04 AM EDT  To: April On[facility]

## 2023-08-21 NOTE — TELEPHONE ENCOUNTER
Called the patient to let him know I talked with Dr. Benedict and made him aware of stopping treatment. Patient was also told that I called in magic mouthwash to [facility] pharmacy and that he could get that for his mouth sores. Patient v/u.

## 2023-08-21 NOTE — TELEPHONE ENCOUNTER
Called the patient back and he states he is not continuing with treatment (FOLFOX). He states that he cannot recover and this is not what he wants. He wanted to call to let Dr. Benedict know. He also states he now has mouth sores that are bothering him. He wanted to make sure all treatment dates were taken off. I let him know I would talk with Dr. Benedict and make him aware. Patient v/u.

## 2023-08-21 NOTE — TELEPHONE ENCOUNTER
Provider: Code  Caller patient  Relationship to Patient: patient  Call Back Phone Number: [phone]  Reason for Call: patient says he is having a lot of problems

## 2023-08-22 ENCOUNTER — OFFICE VISIT (OUTPATIENT)
Dept: ONCOLOGY | Facility: CLINIC | Age: 73
End: 2023-08-22
Payer: MEDICARE

## 2023-08-22 ENCOUNTER — LAB (OUTPATIENT)
Dept: OTHER | Facility: HOSPITAL | Age: 73
End: 2023-08-22
Payer: MEDICARE

## 2023-08-22 ENCOUNTER — INFUSION (OUTPATIENT)
Dept: ONCOLOGY | Facility: HOSPITAL | Age: 73
End: 2023-08-22
Payer: MEDICARE

## 2023-08-22 ENCOUNTER — TELEPHONE (OUTPATIENT)
Dept: ONCOLOGY | Facility: CLINIC | Age: 73
End: 2023-08-22
Payer: MEDICARE

## 2023-08-22 VITALS
OXYGEN SATURATION: 97 % | WEIGHT: 161.3 LBS | RESPIRATION RATE: 18 BRPM | HEART RATE: 86 BPM | DIASTOLIC BLOOD PRESSURE: 78 MMHG | HEIGHT: 68 IN | SYSTOLIC BLOOD PRESSURE: 118 MMHG | TEMPERATURE: 98.6 F | BODY MASS INDEX: 24.44 KG/M2

## 2023-08-22 DIAGNOSIS — C23 GALLBLADDER CANCER: Primary | ICD-10-CM

## 2023-08-22 DIAGNOSIS — C23 MALIGNANT NEOPLASM OF GALLBLADDER: ICD-10-CM

## 2023-08-22 DIAGNOSIS — C23 MALIGNANT NEOPLASM OF GALLBLADDER: Primary | ICD-10-CM

## 2023-08-22 DIAGNOSIS — B37.0 THRUSH: ICD-10-CM

## 2023-08-22 DIAGNOSIS — Z45.9 ENCOUNTER FOR MANAGEMENT OF IMPLANTED DEVICE: Primary | ICD-10-CM

## 2023-08-22 DIAGNOSIS — E87.1 HYPONATREMIA: ICD-10-CM

## 2023-08-22 LAB
ALBUMIN SERPL-MCNC: 4.2 G/DL (ref 3.5–5.2)
ALBUMIN/GLOB SERPL: 1.6 G/DL
ALP SERPL-CCNC: 64 U/L (ref 39–117)
ALT SERPL W P-5'-P-CCNC: 17 U/L (ref 1–41)
ANION GAP SERPL CALCULATED.3IONS-SCNC: 12.5 MMOL/L (ref 5–15)
AST SERPL-CCNC: 38 U/L (ref 1–40)
BASOPHILS # BLD AUTO: 0.01 10*3/MM3 (ref 0–0.2)
BASOPHILS NFR BLD AUTO: 0.3 % (ref 0–1.5)
BILIRUB SERPL-MCNC: 0.8 MG/DL (ref 0–1.2)
BUN SERPL-MCNC: 17 MG/DL (ref 8–23)
BUN/CREAT SERPL: 16.2 (ref 7–25)
CALCIUM SPEC-SCNC: 9 MG/DL (ref 8.6–10.5)
CHLORIDE SERPL-SCNC: 94 MMOL/L (ref 98–107)
CO2 SERPL-SCNC: 20.5 MMOL/L (ref 22–29)
CREAT SERPL-MCNC: 1.05 MG/DL (ref 0.76–1.27)
DEPRECATED RDW RBC AUTO: 46.2 FL (ref 37–54)
EGFRCR SERPLBLD CKD-EPI 2021: 74 ML/MIN/1.73
EOSINOPHIL # BLD AUTO: 0 10*3/MM3 (ref 0–0.4)
EOSINOPHIL NFR BLD AUTO: 0 % (ref 0.3–6.2)
ERYTHROCYTE [DISTWIDTH] IN BLOOD BY AUTOMATED COUNT: 14.5 % (ref 12.3–15.4)
GLOBULIN UR ELPH-MCNC: 2.7 GM/DL
GLUCOSE SERPL-MCNC: 175 MG/DL (ref 65–99)
HCT VFR BLD AUTO: 32.4 % (ref 37.5–51)
HGB BLD-MCNC: 11.3 G/DL (ref 13–17.7)
IMM GRANULOCYTES # BLD AUTO: 0.02 10*3/MM3 (ref 0–0.05)
IMM GRANULOCYTES NFR BLD AUTO: 0.7 % (ref 0–0.5)
LYMPHOCYTES # BLD AUTO: 1.15 10*3/MM3 (ref 0.7–3.1)
LYMPHOCYTES NFR BLD AUTO: 39.1 % (ref 19.6–45.3)
MAGNESIUM SERPL-MCNC: 1.8 MG/DL (ref 1.6–2.4)
MCH RBC QN AUTO: 31.4 PG (ref 26.6–33)
MCHC RBC AUTO-ENTMCNC: 34.9 G/DL (ref 31.5–35.7)
MCV RBC AUTO: 90 FL (ref 79–97)
MONOCYTES # BLD AUTO: 0.24 10*3/MM3 (ref 0.1–0.9)
MONOCYTES NFR BLD AUTO: 8.2 % (ref 5–12)
NEUTROPHILS NFR BLD AUTO: 1.52 10*3/MM3 (ref 1.7–7)
NEUTROPHILS NFR BLD AUTO: 51.7 % (ref 42.7–76)
NRBC BLD AUTO-RTO: 0 /100 WBC (ref 0–0.2)
PLATELET # BLD AUTO: 154 10*3/MM3 (ref 140–450)
PMV BLD AUTO: 9 FL (ref 6–12)
POTASSIUM SERPL-SCNC: 3.5 MMOL/L (ref 3.5–5.2)
PROT SERPL-MCNC: 6.9 G/DL (ref 6–8.5)
RBC # BLD AUTO: 3.6 10*6/MM3 (ref 4.14–5.8)
SODIUM SERPL-SCNC: 127 MMOL/L (ref 136–145)
WBC NRBC COR # BLD: 2.94 10*3/MM3 (ref 3.4–10.8)

## 2023-08-22 PROCEDURE — 80053 COMPREHEN METABOLIC PANEL: CPT | Performed by: INTERNAL MEDICINE

## 2023-08-22 PROCEDURE — 96360 HYDRATION IV INFUSION INIT: CPT

## 2023-08-22 PROCEDURE — 83735 ASSAY OF MAGNESIUM: CPT | Performed by: INTERNAL MEDICINE

## 2023-08-22 PROCEDURE — 85025 COMPLETE CBC W/AUTO DIFF WBC: CPT | Performed by: INTERNAL MEDICINE

## 2023-08-22 PROCEDURE — 25010000002 HEPARIN LOCK FLUSH PER 10 UNITS: Performed by: INTERNAL MEDICINE

## 2023-08-22 RX ORDER — SODIUM CHLORIDE 9 MG/ML
500 INJECTION, SOLUTION INTRAVENOUS ONCE
Status: COMPLETED | OUTPATIENT
Start: 2023-08-22 | End: 2023-08-22

## 2023-08-22 RX ORDER — HEPARIN SODIUM (PORCINE) LOCK FLUSH IV SOLN 100 UNIT/ML 100 UNIT/ML
500 SOLUTION INTRAVENOUS AS NEEDED
Status: CANCELLED | OUTPATIENT
Start: 2023-08-22

## 2023-08-22 RX ORDER — HEPARIN SODIUM (PORCINE) LOCK FLUSH IV SOLN 100 UNIT/ML 100 UNIT/ML
500 SOLUTION INTRAVENOUS AS NEEDED
Status: DISCONTINUED | OUTPATIENT
Start: 2023-08-22 | End: 2023-08-22 | Stop reason: HOSPADM

## 2023-08-22 RX ORDER — SODIUM CHLORIDE 0.9 % (FLUSH) 0.9 %
10 SYRINGE (ML) INJECTION AS NEEDED
Status: CANCELLED | OUTPATIENT
Start: 2023-08-22

## 2023-08-22 RX ORDER — SODIUM CHLORIDE 0.9 % (FLUSH) 0.9 %
10 SYRINGE (ML) INJECTION AS NEEDED
Status: DISCONTINUED | OUTPATIENT
Start: 2023-08-22 | End: 2023-08-22 | Stop reason: HOSPADM

## 2023-08-22 RX ORDER — CLOTRIMAZOLE 10 MG/1
LOZENGE ORAL; TOPICAL
Qty: 70 TABLET | Refills: 0 | Status: SHIPPED | OUTPATIENT
Start: 2023-08-22 | End: 2023-08-24

## 2023-08-22 RX ADMIN — Medication 500 UNITS: at 16:13

## 2023-08-22 RX ADMIN — SODIUM CHLORIDE 500 ML: 9 INJECTION, SOLUTION INTRAVENOUS at 15:36

## 2023-08-22 RX ADMIN — Medication 10 ML: at 16:13

## 2023-08-22 RX ADMIN — SODIUM CHLORIDE 500 ML: 9 INJECTION, SOLUTION INTRAVENOUS at 14:50

## 2023-08-22 NOTE — TELEPHONE ENCOUNTER
Viviana Bell called me back and plan is for patient to go to EP for labs and see the NP at 2pm with possible fluids after if needed. Patient v/u.

## 2023-08-22 NOTE — TELEPHONE ENCOUNTER
Provider: Code  Caller: patient  Relationship to Patient: patient  Call Back Phone Number: [phone]  Reason for Call: patient says he is so weak he can hardly walk and needs to be seen today

## 2023-08-22 NOTE — TELEPHONE ENCOUNTER
"Called patient back and he stated he felt very weak and just not himself. He stated that he feels like his \"counts\" are off and that he should be seen. I told him to give me a little time since it was the afternoon and getting an appt same day would take me some time. Viviana Bell is at  and was in with a patient so I called April RN to see about her 2pm opening to see the patient with labs prior before making any scheduling changes. Waiting to hear back.   "

## 2023-08-22 NOTE — PROGRESS NOTES
.     REASON FOR FOLLOWUP :   Squamous cell carcinoma of the gallbladder    HISTORY OF PRESENT ILLNESS:  The patient is a 73 y.o. year old male with the above-mentioned history who is seen today with complaints of weakness.  Patient has received a total of 3 cycles of palliative FOLFOX.  He has now decided that he no longer wants to receive further chemotherapy.  He is having significant side effects as far as weakness, constipation, mouth sores, myalgias/ cramping.  Patient called today requesting to be seen because he is concerned that his blood counts are low and he is having significant weakness.    He notes that he has had increase in tingling in his fingers as well as his toes.  He states that his toes seem to be worse in his fingers.  He is also having pain in his calves as well as his thigh muscles, and cramps in his legs at night.  Patient complains of sores in his mouth.  He was prescribed a mouthwash, but the pharmacy will not have it ready until this afternoon.    He is also been struggling with constipation.  He feels like he is on a good regimen currently taking Senokot-as 3 tablets twice a day, and then MiraLAX when needed.  Patient does note some occasional mild nausea but is trying to avoid taking antiemetics as he is concerned about becoming more constipated.  He continues on Eliquis 5 mg twice a day and denies bleeding issues.    Past Medical History:   Diagnosis Date    Cancer     gallbladder    Gilbert syndrome     Hyperlipidemia     Hypertension      Past Surgical History:   Procedure Laterality Date    CHOLECYSTECTOMY WITH INTRAOPERATIVE CHOLANGIOGRAM N/A 02/11/2023    Procedure: CHOLECYSTECTOMY LAPAROSCOPIC INTRAOPERATIVE CHOLANGIOGRAM;  Surgeon: Vianey Horn MD;  Location: [facility];  Service: General;  Laterality: N/A;    COLONOSCOPY      PILONIDAL CYST DRAINAGE      VENOUS ACCESS DEVICE (PORT) INSERTION N/A 3/9/2023    Procedure: INSERTION VENOUS ACCESS DEVICE;  Surgeon:  "Vianey Horn MD;  Location: [facility];  Service: General;  Laterality: N/A;       MEDICATIONS    Current Outpatient Medications:     apixaban (ELIQUIS) 5 MG tablet tablet, Take 1 tablet by mouth 2 (Two) Times a Day., Disp: 56 tablet, Rfl: 0    lisinopril (PRINIVIL,ZESTRIL) 20 MG tablet, Take 1 tablet by mouth Daily., Disp: , Rfl:     ondansetron (ZOFRAN) 8 MG tablet, Take 1 tablet by mouth 3 (Three) Times a Day As Needed for Nausea or Vomiting., Disp: 30 tablet, Rfl: 5    prochlorperazine (COMPAZINE) 10 MG tablet, Take 1 tablet by mouth Every 6 (Six) Hours As Needed for Nausea., Disp: 30 tablet, Rfl: 2    clotrimazole (MYCELEX) 10 MG shona, One tablet dissolved slowly by mouth 5 times daily x 14 days, Disp: 70 tablet, Rfl: 0  No current facility-administered medications for this visit.    Facility-Administered Medications Ordered in Other Visits:     heparin injection 500 Units, 500 Units, Intravenous, PRN, Vu Benedict II, MD, 500 Units at 08/22/23 1613    sodium chloride 0.9 % flush 10 mL, 10 mL, Intravenous, PRN, Vu Benedict II, MD, 10 mL at 08/22/23 1613    ALLERGIES:   No Known Allergies    SOCIAL HISTORY:       Social History     Socioeconomic History    Marital status:      Spouse name: Oly   Tobacco Use    Smoking status: Former     Types: Cigarettes   Vaping Use    Vaping Use: Never used   Substance and Sexual Activity    Alcohol use: Yes    Drug use: Never    Sexual activity: Defer         FAMILY HISTORY:  Family History   Problem Relation Age of Onset    Malig Hyperthermia Neg Hx        REVIEW OF SYSTEMS:  Review of Systems  ROS as per HPI         Vitals:    08/22/23 1438   BP: 118/78   Pulse: 86   Resp: 18   Temp: 98.6 øF (37 øC)   TempSrc: Temporal   SpO2: 97%   Weight: 73.2 kg (161 lb 4.8 oz)   Height: 173 cm (68.11\")   PainSc: 0-No pain           8/22/2023     2:44 PM   Current Status   ECOG score 0      Physical Exam  Vitals reviewed.   Constitutional:       General: He is " not in acute distress.     Appearance: Normal appearance. He is well-developed.   HENT:      Head: Normocephalic and atraumatic.      Mouth/Throat:      Comments: Patchy oropharyngeal candidiasis  Eyes:      Pupils: Pupils are equal, round, and reactive to light.   Cardiovascular:      Rate and Rhythm: Normal rate and regular rhythm.      Heart sounds: Normal heart sounds. No murmur heard.  Pulmonary:      Effort: Pulmonary effort is normal. No respiratory distress.      Breath sounds: Normal breath sounds. No wheezing, rhonchi or rales.   Abdominal:      General: Bowel sounds are normal. There is no distension.      Palpations: Abdomen is soft.      Tenderness: There is no abdominal tenderness.   Musculoskeletal:         General: Normal range of motion.      Cervical back: Normal range of motion.   Skin:     General: Skin is warm and dry.      Findings: No rash.   Neurological:      Mental Status: He is alert and oriented to person, place, and time.     I have reexamined the patient and the results are consistent with the previously documented exam. Viviana Bell, TY         RECENT LABS:  Results from last 7 days   Lab Units 08/22/23  1436   WBC 10*3/mm3 2.94*   NEUTROS ABS 10*3/mm3 1.52*   HEMOGLOBIN g/dL 11.3*   HEMATOCRIT % 32.4*   PLATELETS 10*3/mm3 154       Results from last 7 days   Lab Units 08/22/23  1436   SODIUM mmol/L 127*   POTASSIUM mmol/L 3.5   CHLORIDE mmol/L 94*   CO2 mmol/L 20.5*   BUN mg/dL 17   CREATININE mg/dL 1.05   CALCIUM mg/dL 9.0   ALBUMIN g/dL 4.2   BILIRUBIN mg/dL 0.8   ALK PHOS U/L 64   ALT (SGPT) U/L 17   AST (SGOT) U/L 38   GLUCOSE mg/dL 175*   MAGNESIUM mg/dL 1.8             Assessment & Plan   Gallbladder cancer  - Magnesium    Hyponatremia    Thrush        Guillermo Salgado   *Squamous cell carcinoma of the gallbladder  Incidentally found on path review from cholecystectomy.  Due to symptomatic cholelithiasis, laparoscopic cholecystectomy 2/11/2023, Dr. Arabella Horn: Invasive  well-differentiated keratinizing squamous cell carcinoma.  5 cm.  Invades perimuscular connective tissue.  Negative for lymph-vascular invasion.  Positive for focal perineural invasion.  Cystic duct margin negative.  Grade 1.  Margins were felt to be negative per pathologist.  Pathologist stage this as a [id number] cancer.  Dr. Horn stated the gallbladder was significantly inflamed and came out in fragments so she is not sure that staging is entirely clinically accurate.  She noted she also sent the patient to Dr. Alex Delgado of hepatobiliary surgery to determine if patient needs formal lymphadenectomy/liver resection.  Per NCCN guidelines: Needs multiphase abdomen/pelvis CT or MRI with IV contrast and CT chest with or without IV contrast.  Consider staging laparoscopy.  If felt to be resectable, then hepatic resection with lymphadenectomy with or without bile duct excision for malignant involvement.  If felt to be unresectable, then MSI/MMR testing, TMB testing, additional molecular testing to include an TRK.  Options include systemic therapy (preferred), clinical trial (preferred), palliative XRT, or best supportive care.  Per up-to-date guidelines, for a T2 cancer found incidentally after gallbladder surgery: Reexploration and extended cholecystectomy are also indicated.  Re-exploration identifies residual tumor in 40 to 75% of cases, and a high likelihood of liver involvement and jacki metastasis with T2 disease.  Re-resection significantly increases the likelihood of long-term DFS in patients with T2 disease.  In many series, 5-year OS increased from 25-40%, up to % with aggressive surgery.  Up-to-date authors recommend re-resection to resect at least a 2 cm margin of the liver bed and perform portal/hepatoduodenal ligament lymphadenectomy.  Up-to-date authors recommend adjuvant therapy for all patients with completely resected T1b or higher or node positive or margin positive gallbladder cancer.   Up-to-date authors note [facility] suggests adjuvant therapy for all patients with resected gallbladder cancer.  Up-to-date authors note there is no consensus on the optimal adjuvant therapy but they recommend 6 months of postoperative chemotherapy alone with Xeloda monotherapy for most patients.  They note if Xeloda is chosen they start treatment with no more than 1500 mg total dose twice daily.  They note an alternative approaches combining concomitant 5-FU based chemoradiotherapy with 4 months of systemic chemotherapy especially with patients with margin positive disease.  They note in this situation chemotherapy can be with Xeloda monotherapy or Xeloda plus Gemzar.  They note for patients who received chemo XRT plus adjuvant chemo, there is no consensus on sequencing.  In general they state it is preferable to start with chemotherapy first, complete 3 to 4 months of systemic exposure because this will avoid XRT for patients who are destined to develop early distant disease.  Dr. Alex Delgado, surgical oncology, states although LAD not read on MRI 2/10/2023, he sees bulky LAD on his imaging review.  He states this is currently not resectable due to the bulky LAD and requests chemotherapy in hopes this will become resectable.  He requests avoiding XRT.  Per the Topaz 1 trial, plan Gemzar 1000 mg/m2 D1, D8 and cisplatin 25 mg/m2 D1, D8 and durvalumab 1500 mg flat dose.  Cycles every 3 weeks.  Up to 8 cycles.  This can be followed by durvalumab 1500 mg flat dose every 4 weeks.  Durvalumab was associated with a longer PFS and a higher objective response rate (26.7% versus 18.7% without durvalumab).  The Topaz 1 trial included previously untreated unresectable locally advanced or metastatic intrahepatic or extrahepatic cholangiocarcinoma or gallbladder cancer.  Also, cisplatin and Gemzar is a common regimen in squamous cell carcinoma of a more common primary (lung).  Therefore, I feel cisplatin Gemzar durvalumab is the best  regimen for this currently unresectable squamous cell carcinoma of the gallbladder.  Dr. Delgado is recommended a prechemotherapy PET scan and then to check a PET scan again after roughly 2 months of chemotherapy to see if there has been enough improvement to see if this is resectable.  PET 3/2/2023: Intensely active tanner hepatis nodes, and ill-defined hypodensity in the liver parenchyma adjacent to gallbladder fossa, corresponding to areas of apparent masslike invasion seen on recent MRI, felt to be concerning for malignant invasion of the liver.  A few subcentimeter hypodense lesions in the liver too small to characterize.  Could not exclude peritoneal invasion/carcinomatosis.  Suggestion of a patent active thyroid nodule.  Radiologist recommended correlation with thyroid ultrasound.  Pretreatment CA 19-9 and CEA normal  3/13/2023: Begin Gemzar 1000 mg/m2 D1, D8 and cisplatin 25 mg/m2 D1, D8 and durvalumab 1500 mg flat dose.  Cycles every 3 weeks.  Up to 8 cycles.  This can be followed by durvalumab 1500 mg flat dose every 4 weeks   (if this cannot be resected, then the Topaz 1 trial continue durvalumab until progression or toxicity.  If this can be resected, likely would not continue single agent durvalumab).  3/20/2023, C1 D8: AST 86, .  Stop atorvastatin that was resumed at discharge.  Hold Gemzar.  Give cisplatin.  3/27/2023: AST 25, ALT 57.  With improvement in LFTs after holding Gemzar, thought to Gemzar likely the cause.  However:  4/3/23, C2 D1: AST up to 116, ALT up to 213.  Held Gemzar again (per DrMarcial #2).  The thought was if further elevation in LFTs may need to consider steroids for durvalumab liver toxicity.  4/10/23, C2 D8: AST 18, ALT 49.  Gemzar given at 25% dose reduction.  5/1/2023: C3d 8, one-time dose reduction of Gemzar by another 25% due to .  Maintain same dose cisplatin.  Add Neulasta if insurance will allow  (Was having mild increase in tinnitus but this is tolerable.  He  understands cisplatin can significantly and permanently worsen tinnitus.  He denies hearing loss.  He understands this risk and wants to continue cisplatin at same dose.  PET 5/8/2023 (after C3d8): A few new pulmonary nodules.  Cluster of index nodules, RLL, up to 0.8 cm.  Index sub-6 mm nodule RUL also new.  Radiologist notes the pulmonary nodules are nonspecific but in context are concerning for lung metastasis.  Gastrohepatic node 1.4 cm from 0.8 cm with SUV 6.7, from 3.9.  Raven hepatic node 2.7 cm, unchanged in size, but SUV 8.8, from 7.4.  The liver along the gallbladder fossa with SUV 7.8, from 9.5, the size of the hypermetabolic activity is unchanged, 5.1 x 3.5 cm.  Smaller area of uptake above that of the adjacent hepatic parenchyma, SUV 3, as before.  New or more defined soft tissue density nodules anterior inferior to the central liver.  Index lesion inferior to the gallbladder fossa now much more defined, 1.4 x 0.9 cm with SUV 3.3.  Index lesion along anterior lateral aspect of central liver new, 0.7 cm.  Radiologist notes this suggests peritoneal carcinomatosis.  This seems mostly consistent with progression.  NCCN options include FOLFOX, Gemzar Abraxane, FOLFIRI, Regorafenib.  FOLFOX is preferred  5/12/2023: Dr. Alex Delgado agrees with me: PET is consistent with progression.  He is no longer felt to be a surgical candidate.  Patient understands this is no longer felt to be curable.  Progressed on Gemzar cisplatin durvalumab.  Therefore, per NCCN guidelines switch to FOLFOX.  He requests 2 weeks break before beginning FOLFOX to try and improve the fatigue that worsened towards the end of his last chemo regimen.  5/16/2023: Patient wants a break from chemotherapy and does not want to begin FOLFOX.  He would like observation and 1 significant growth is seen on scans he will consider FOLFIRI.  He would like to avoid Oxaliplatin because he is worried about developing neuropathy.  CT 7/10/2023: Progression.   Raven hepatis mass larger, causing common hepatic duct obstruction with mild to moderate by lobar intrahepatic biliary dilation.  Thrombus main portal vein extending to the splenoportal confluence.  Discussed with radiologist, she thinks this is direct extension of tumor (tumor thrombus).  7/17/2023: Discussed options including comfort care with hospice versus chemotherapy.  I explained NCCN guidelines prefer FOLFOX.  I explained since no response to initial chemo, and this tumor is typically not very responsive to chemo, the chances of response to further chemo is low.  After long discussion, including his wife, he has decided to try FOLFOX.  He states he will be quick to stop it if he has too many side effects  7/31/2023 cycle 2 FOLFOX, overall tolerating well so far.  8/14/2023 with cycle 3 FOLFOX.  Patient called complaining of multiple side effects of treatment and wants to discontinue treatment.  Seen 8/21/2023 with complaints of worsening weakness, as well as several complaints.  His sodium level is decreased today and you will receive IV fluids as discussed below.    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  7/31/2023 continues on Eliquis 5 mg twice daily, tolerating well.    *Suggestion of PET active thyroid nodule on PET 3/2/2023.  PET 3/2/2023: Radiologist recommended thyroid ultrasound.    3/7/2023: Ordered thyroid ultrasound (but patient understands even if something looks concerning, addressed the gallbladder cancer first and only work on the thyroid nodule if gallbladder cancer treatment has completed and is felt to be in remission  Thyroid ultrasound 3/8/2023: TI-RADS 3.  Radiologist stated consider 1 year follow-up if clinically indicated.  5/12/2023: Gallbladder cancer is now metastatic (and noncurable).  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  Did not worsen.  No issues otherwise.  7/14/2023: Planning FOLFOX.  He understands this is expected to worsen his neuropathy  8/14/2023 denies issues with worsening neuropathy.    *Borderline neutropenia  8/14/2023, WBC 1.54, ANC 1.13  Counts are acceptable for treatment today though we did discuss WBC and ANC trending downward  We will submit for Neulasta on body to utilize with future cycles of chemotherapy to avoid delays  The patient understands to avoid crowds, good hand hygiene and call for fevers or chills, signs or symptoms of infection  8/22/2023 WBC 2.94, ANC 1.52.    *Constipation  Previously utilizing Senokot S2 tablets twice daily and MiraLAX twice daily  Lactulose was added though this resulted in significant abdominal constipation  We discussed today discontinuing lactulose, resuming Senokot 2 tablets twice daily and MiraLAX twice daily and utilizing milk of magnesium.  We will also discontinue Zofran as this is likely exacerbating his constipation and instead utilize prochlorperazine for breakthrough nausea  8/22/2023 Using Senokot-S,  3 tablets twice a day and occasional MiraLAX and reports good control of his constipation with this regimen.    *Hyponatremia: 8/22/2023 sodium level 127, likely contributing to patient's weakness.  Patient will receive IV fluids, 1 L of normal saline today.  Encouraged to increase his sodium intake at home.  He will return on 8/24/2023 for follow-up with nurse practitioner with repeat CBC, stat CMP, and additional IV hydration if needed.    Thrush: complaining of mouth sores.  On exam he is noted to have patchy oral thrush.  Patient will be started on Mycelex and advised to start Magic mouthwash as soon as he is able to  the prescription.    Plan  Proceed with 1 L of IV normal saline today.  Patient increase sodium intake  in his diet at home.  Return on Thursday, 8/24/2023 with repeat labs, CBC, stat CMP, reevaluation by nurse practitioner, and possible IV fluids if needed.  Continue Senokot-S 3 tablets, twice daily with additional MiraLAX as needed.  Patient to start Mycelex to help with thrush.  Advised him to start Magic mouthwash prescription as soon as he is able to pick that up as well.  Patient currently scheduled for follow-up with nurse practitioner on 8/28/2023 with repeat labs and reevaluation.  Patient is scheduled for follow-up CT scans 9/7/2023, and would like to keep that appointment.  He will follow-up with Dr. Benedict for scan review 9/11/2023.    Continue Eliquis 5 mg twice daily.  Continue Compazine if needed for nausea.  Patient wanting to discontinue chemotherapy due to side effects.  Call/ return sooner should the patient develop any new concerns or problems.      Patient is on a high risk medication requiring close monitoring for toxicity.      Viviana Bell, TY  08/22/2023        I spent 50 minutes caring for [place] on this date of service. This time includes time spent by me in the following activities: preparing for the visit, reviewing tests, obtaining and/or reviewing a separately obtained history, performing a medically appropriate examination and/or evaluation, counseling and educating the patient/family/caregiver, ordering medications, tests, or procedures, referring and communicating with other health care professionals, documenting information in the medical record, independently interpreting results and communicating that information with the patient/family/caregiver, and care coordination

## 2023-08-24 ENCOUNTER — OFFICE VISIT (OUTPATIENT)
Dept: ONCOLOGY | Facility: CLINIC | Age: 73
End: 2023-08-24
Payer: MEDICARE

## 2023-08-24 ENCOUNTER — INFUSION (OUTPATIENT)
Dept: ONCOLOGY | Facility: HOSPITAL | Age: 73
End: 2023-08-24
Payer: MEDICARE

## 2023-08-24 VITALS
BODY MASS INDEX: 24.71 KG/M2 | OXYGEN SATURATION: 98 % | DIASTOLIC BLOOD PRESSURE: 85 MMHG | WEIGHT: 163 LBS | TEMPERATURE: 98 F | RESPIRATION RATE: 16 BRPM | HEIGHT: 68 IN | SYSTOLIC BLOOD PRESSURE: 135 MMHG | HEART RATE: 77 BPM

## 2023-08-24 DIAGNOSIS — C23 GALLBLADDER CANCER: Primary | ICD-10-CM

## 2023-08-24 DIAGNOSIS — C23 GALLBLADDER CANCER: ICD-10-CM

## 2023-08-24 DIAGNOSIS — Z45.9 ENCOUNTER FOR MANAGEMENT OF IMPLANTED DEVICE: Primary | ICD-10-CM

## 2023-08-24 DIAGNOSIS — C23 MALIGNANT NEOPLASM OF GALLBLADDER: Primary | ICD-10-CM

## 2023-08-24 DIAGNOSIS — E87.1 HYPONATREMIA: ICD-10-CM

## 2023-08-24 DIAGNOSIS — B37.0 THRUSH: ICD-10-CM

## 2023-08-24 LAB
ALBUMIN SERPL-MCNC: 3.9 G/DL (ref 3.5–5.2)
ALBUMIN/GLOB SERPL: 1.3 G/DL
ALP SERPL-CCNC: 61 U/L (ref 39–117)
ALT SERPL W P-5'-P-CCNC: 19 U/L (ref 1–41)
ANION GAP SERPL CALCULATED.3IONS-SCNC: 11.6 MMOL/L (ref 5–15)
AST SERPL-CCNC: 42 U/L (ref 1–40)
BASOPHILS # BLD AUTO: 0 10*3/MM3 (ref 0–0.2)
BASOPHILS NFR BLD AUTO: 0 % (ref 0–1.5)
BILIRUB SERPL-MCNC: 0.5 MG/DL (ref 0–1.2)
BUN SERPL-MCNC: 18 MG/DL (ref 8–23)
BUN/CREAT SERPL: 18.9 (ref 7–25)
CALCIUM SPEC-SCNC: 9 MG/DL (ref 8.6–10.5)
CHLORIDE SERPL-SCNC: 97 MMOL/L (ref 98–107)
CO2 SERPL-SCNC: 21.4 MMOL/L (ref 22–29)
CREAT SERPL-MCNC: 0.95 MG/DL (ref 0.76–1.27)
DEPRECATED RDW RBC AUTO: 47.5 FL (ref 37–54)
EGFRCR SERPLBLD CKD-EPI 2021: 83.5 ML/MIN/1.73
EOSINOPHIL # BLD AUTO: 0 10*3/MM3 (ref 0–0.4)
EOSINOPHIL NFR BLD AUTO: 0 % (ref 0.3–6.2)
ERYTHROCYTE [DISTWIDTH] IN BLOOD BY AUTOMATED COUNT: 14.8 % (ref 12.3–15.4)
GLOBULIN UR ELPH-MCNC: 2.9 GM/DL
GLUCOSE SERPL-MCNC: 145 MG/DL (ref 65–99)
HCT VFR BLD AUTO: 30.7 % (ref 37.5–51)
HGB BLD-MCNC: 10.8 G/DL (ref 13–17.7)
IMM GRANULOCYTES # BLD AUTO: 0.01 10*3/MM3 (ref 0–0.05)
IMM GRANULOCYTES NFR BLD AUTO: 0.3 % (ref 0–0.5)
LYMPHOCYTES # BLD AUTO: 1.19 10*3/MM3 (ref 0.7–3.1)
LYMPHOCYTES NFR BLD AUTO: 33.7 % (ref 19.6–45.3)
MCH RBC QN AUTO: 31.9 PG (ref 26.6–33)
MCHC RBC AUTO-ENTMCNC: 35.2 G/DL (ref 31.5–35.7)
MCV RBC AUTO: 90.6 FL (ref 79–97)
MONOCYTES # BLD AUTO: 0.32 10*3/MM3 (ref 0.1–0.9)
MONOCYTES NFR BLD AUTO: 9.1 % (ref 5–12)
NEUTROPHILS NFR BLD AUTO: 2.01 10*3/MM3 (ref 1.7–7)
NEUTROPHILS NFR BLD AUTO: 56.9 % (ref 42.7–76)
NRBC BLD AUTO-RTO: 0 /100 WBC (ref 0–0.2)
PLATELET # BLD AUTO: 135 10*3/MM3 (ref 140–450)
PMV BLD AUTO: 8.9 FL (ref 6–12)
POTASSIUM SERPL-SCNC: 3.6 MMOL/L (ref 3.5–5.2)
PROT SERPL-MCNC: 6.8 G/DL (ref 6–8.5)
RBC # BLD AUTO: 3.39 10*6/MM3 (ref 4.14–5.8)
SODIUM SERPL-SCNC: 130 MMOL/L (ref 136–145)
WBC NRBC COR # BLD: 3.53 10*3/MM3 (ref 3.4–10.8)

## 2023-08-24 PROCEDURE — 85025 COMPLETE CBC W/AUTO DIFF WBC: CPT | Performed by: NURSE PRACTITIONER

## 2023-08-24 PROCEDURE — 80053 COMPREHEN METABOLIC PANEL: CPT | Performed by: NURSE PRACTITIONER

## 2023-08-24 PROCEDURE — 96360 HYDRATION IV INFUSION INIT: CPT

## 2023-08-24 PROCEDURE — 96361 HYDRATE IV INFUSION ADD-ON: CPT

## 2023-08-24 PROCEDURE — 25010000002 HEPARIN LOCK FLUSH PER 10 UNITS: Performed by: INTERNAL MEDICINE

## 2023-08-24 RX ORDER — SODIUM CHLORIDE 0.9 % (FLUSH) 0.9 %
10 SYRINGE (ML) INJECTION AS NEEDED
OUTPATIENT
Start: 2023-08-24

## 2023-08-24 RX ORDER — HEPARIN SODIUM (PORCINE) LOCK FLUSH IV SOLN 100 UNIT/ML 100 UNIT/ML
500 SOLUTION INTRAVENOUS AS NEEDED
Status: DISCONTINUED | OUTPATIENT
Start: 2023-08-24 | End: 2023-08-24 | Stop reason: HOSPADM

## 2023-08-24 RX ORDER — SODIUM CHLORIDE 0.9 % (FLUSH) 0.9 %
10 SYRINGE (ML) INJECTION AS NEEDED
Status: DISCONTINUED | OUTPATIENT
Start: 2023-08-24 | End: 2023-08-24 | Stop reason: HOSPADM

## 2023-08-24 RX ORDER — HEPARIN SODIUM (PORCINE) LOCK FLUSH IV SOLN 100 UNIT/ML 100 UNIT/ML
500 SOLUTION INTRAVENOUS AS NEEDED
OUTPATIENT
Start: 2023-08-24

## 2023-08-24 RX ORDER — SODIUM CHLORIDE 9 MG/ML
1000 INJECTION, SOLUTION INTRAVENOUS ONCE
Status: COMPLETED | OUTPATIENT
Start: 2023-08-24 | End: 2023-08-24

## 2023-08-24 RX ADMIN — Medication 500 UNITS: at 15:12

## 2023-08-24 RX ADMIN — SODIUM CHLORIDE 1000 ML: 9 INJECTION, SOLUTION INTRAVENOUS at 13:42

## 2023-08-24 RX ADMIN — Medication 10 ML: at 15:11

## 2023-08-24 NOTE — PROGRESS NOTES
.     REASON FOR FOLLOWUP :   Squamous cell carcinoma of the gallbladder    HISTORY OF PRESENT ILLNESS:  The patient is a 73 y.o. year old male with the above-mentioned history who is here today for reevaluation.  He was seen on 8/22/2023 with complaints of mouth sores and weakness after he had received his third cycle of palliative FOLFOX chemotherapy on 8/14/2023.  Patient was found to have a low sodium level at 127.  He was given IV hydration.  He had also been prescribed Magic mouthwash for his mouth sores as well as Mycelex.  Patient states that he started using the Magic mouthwash which caused him to become really nauseated and vomit.  He was then afraid to even try the Mycelex.  He feels like his mouth sores have actually improved.  He is feeling better overall.  He reports that his bowels are now moving normally.  His weakness and myalgias have also improved.      Past Medical History:   Diagnosis Date    Cancer     gallbladder    Gilbert syndrome     Hyperlipidemia     Hypertension      Past Surgical History:   Procedure Laterality Date    CHOLECYSTECTOMY WITH INTRAOPERATIVE CHOLANGIOGRAM N/A 02/11/2023    Procedure: CHOLECYSTECTOMY LAPAROSCOPIC INTRAOPERATIVE CHOLANGIOGRAM;  Surgeon: Vianey Horn MD;  Location: [facility] OR;  Service: General;  Laterality: N/A;    COLONOSCOPY      PILONIDAL CYST DRAINAGE      VENOUS ACCESS DEVICE (PORT) INSERTION N/A 3/9/2023    Procedure: INSERTION VENOUS ACCESS DEVICE;  Surgeon: Vianey Horn MD;  Location: [facility];  Service: General;  Laterality: N/A;       MEDICATIONS    Current Outpatient Medications:     apixaban (ELIQUIS) 5 MG tablet tablet, Take 1 tablet by mouth 2 (Two) Times a Day., Disp: 56 tablet, Rfl: 0    lisinopril (PRINIVIL,ZESTRIL) 20 MG tablet, Take 1 tablet by mouth Daily., Disp: , Rfl:     ondansetron (ZOFRAN) 8 MG tablet, Take 1 tablet by mouth 3 (Three) Times a Day As Needed for Nausea or Vomiting., Disp: 30 tablet, Rfl: 5     "prochlorperazine (COMPAZINE) 10 MG tablet, Take 1 tablet by mouth Every 6 (Six) Hours As Needed for Nausea., Disp: 30 tablet, Rfl: 2    clotrimazole (MYCELEX) 10 MG shona, One tablet dissolved slowly by mouth 5 times daily x 14 days, Disp: 70 tablet, Rfl: 0  No current facility-administered medications for this visit.    Facility-Administered Medications Ordered in Other Visits:     heparin injection 500 Units, 500 Units, Intravenous, PRN, Vu Benedict II, MD    sodium chloride 0.9 % flush 10 mL, 10 mL, Intravenous, PRN, Vu Benedict II, MD    ALLERGIES:   No Known Allergies    SOCIAL HISTORY:       Social History     Socioeconomic History    Marital status:      Spouse name: Oly   Tobacco Use    Smoking status: Former     Types: Cigarettes   Vaping Use    Vaping Use: Never used   Substance and Sexual Activity    Alcohol use: Yes    Drug use: Never    Sexual activity: Defer         FAMILY HISTORY:  Family History   Problem Relation Age of Onset    Malig Hyperthermia Neg Hx        REVIEW OF SYSTEMS:  Review of Systems  ROS as per HPI         Vitals:    08/24/23 1304   BP: 135/85   Pulse: 77   Resp: 16   Temp: 98 øF (36.7 øC)   TempSrc: Oral   SpO2: 98%   Weight: 73.9 kg (163 lb)   Height: 173 cm (68.11\")   PainSc: 0-No pain           8/24/2023     1:07 PM   Current Status   ECOG score 0      Physical Exam  Vitals and nursing note reviewed.   Constitutional:       Appearance: Normal appearance. He is well-developed.   HENT:      Head: Normocephalic and atraumatic.      Nose: Nose normal.   Eyes:      Pupils: Pupils are equal, round, and reactive to light.   Cardiovascular:      Rate and Rhythm: Normal rate and regular rhythm.      Heart sounds: Normal heart sounds.   Pulmonary:      Effort: Pulmonary effort is normal. No respiratory distress.      Breath sounds: Normal breath sounds. No wheezing, rhonchi or rales.   Abdominal:      General: Bowel sounds are normal. There is no distension.      " Palpations: Abdomen is soft.      Tenderness: There is no abdominal tenderness.   Musculoskeletal:         General: Normal range of motion.      Cervical back: Normal range of motion and neck supple.   Skin:     General: Skin is warm and dry.   Neurological:      Mental Status: He is alert and oriented to person, place, and time.   Psychiatric:         Behavior: Behavior normal.     I have reexamined the patient and the results are consistent with the previously documented exam. Viviana Bell, TY         RECENT LABS:  Results from last 7 days   Lab Units 08/24/23  1259 08/22/23  1436   WBC 10*3/mm3 3.53 2.94*   NEUTROS ABS 10*3/mm3 2.01 1.52*   HEMOGLOBIN g/dL 10.8* 11.3*   HEMATOCRIT % 30.7* 32.4*   PLATELETS 10*3/mm3 135* 154       Results from last 7 days   Lab Units 08/24/23  1259 08/22/23  1436   SODIUM mmol/L 130* 127*   POTASSIUM mmol/L 3.6 3.5   CHLORIDE mmol/L 97* 94*   CO2 mmol/L 21.4* 20.5*   BUN mg/dL 18 17   CREATININE mg/dL 0.95 1.05   CALCIUM mg/dL 9.0 9.0   ALBUMIN g/dL 3.9 4.2   BILIRUBIN mg/dL 0.5 0.8   ALK PHOS U/L 61 64   ALT (SGPT) U/L 19 17   AST (SGOT) U/L 42* 38   GLUCOSE mg/dL 145* 175*   MAGNESIUM mg/dL  --  1.8             Assessment & Plan   Gallbladder cancer    Hyponatremia        Guillermo Salgado   *Squamous cell carcinoma of the gallbladder  Incidentally found on path review from cholecystectomy.  Due to symptomatic cholelithiasis, laparoscopic cholecystectomy 2/11/2023, Dr. 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  5 cm.  Invades perimuscular connective tissue.  Negative for lymph-vascular invasion.  Positive for focal perineural invasion.  Cystic duct margin negative.  Grade 1.  Margins were felt to be negative per pathologist.  Pathologist stage this as a [id number] cancer.  Dr. Horn stated the gallbladder was significantly inflamed and came out in fragments so she is not sure that staging is entirely clinically accurate.  She noted she  also sent the patient to Dr. Alex Delgado of hepatobiliary surgery to determine if patient needs formal lymphadenectomy/liver resection.  Per NCCN guidelines: Needs multiphase abdomen/pelvis CT or MRI with IV contrast and CT chest with or without IV contrast.  Consider staging laparoscopy.  If felt to be resectable, then hepatic resection with lymphadenectomy with or without bile duct excision for malignant involvement.  If felt to be unresectable, then MSI/MMR testing, TMB testing, additional molecular testing to include an TRK.  Options include systemic therapy (preferred), clinical trial (preferred), palliative XRT, or best supportive care.  Per up-to-date guidelines, for a T2 cancer found incidentally after gallbladder surgery: Reexploration and extended cholecystectomy are also indicated.  Re-exploration identifies residual tumor in 40 to 75% of cases, and a high likelihood of liver involvement and jacki metastasis with T2 disease.  Re-resection significantly increases the likelihood of long-term DFS in patients with T2 disease.  In many series, 5-year OS increased from 25-40%, up to % with aggressive surgery.  Up-to-date authors recommend re-resection to resect at least a 2 cm margin of the liver bed and perform portal/hepatoduodenal ligament lymphadenectomy.  Up-to-date authors recommend adjuvant therapy for all patients with completely resected T1b or higher or node positive or margin positive gallbladder cancer.  Up-to-date authors note [facility] suggests adjuvant therapy for all patients with resected gallbladder cancer.  Up-to-date authors note there is no consensus on the optimal adjuvant therapy but they recommend 6 months of postoperative chemotherapy alone with Xeloda monotherapy for most patients.  They note if Xeloda is chosen they start treatment with no more than 1500 mg total dose twice daily.  They note an alternative approaches combining concomitant 5-FU based chemoradiotherapy with 4 months of  systemic chemotherapy especially with patients with margin positive disease.  They note in this situation chemotherapy can be with Xeloda monotherapy or Xeloda plus Gemzar.  They note for patients who received chemo XRT plus adjuvant chemo, there is no consensus on sequencing.  In general they state it is preferable to start with chemotherapy first, complete 3 to 4 months of systemic exposure because this will avoid XRT for patients who are destined to develop early distant disease.  Dr. Alex Delgado, surgical oncology, states although LAD not read on MRI 2/10/2023, he sees bulky LAD on his imaging review.  He states this is currently not resectable due to the bulky LAD and requests chemotherapy in hopes this will become resectable.  He requests avoiding XRT.  Per the Topaz 1 trial, plan Gemzar 1000 mg/m2 D1, D8 and cisplatin 25 mg/m2 D1, D8 and durvalumab 1500 mg flat dose.  Cycles every 3 weeks.  Up to 8 cycles.  This can be followed by durvalumab 1500 mg flat dose every 4 weeks.  Durvalumab was associated with a longer PFS and a higher objective response rate (26.7% versus 18.7% without durvalumab).  The Topaz 1 trial included previously untreated unresectable locally advanced or metastatic intrahepatic or extrahepatic cholangiocarcinoma or gallbladder cancer.  Also, cisplatin and Gemzar is a common regimen in squamous cell carcinoma of a more common primary (lung).  Therefore, I feel cisplatin Gemzar durvalumab is the best regimen for this currently unresectable squamous cell carcinoma of the gallbladder.  Dr. Delgado is recommended a prechemotherapy PET scan and then to check a PET scan again after roughly 2 months of chemotherapy to see if there has been enough improvement to see if this is resectable.  PET 3/2/2023: Intensely active tanner hepatis nodes, and ill-defined hypodensity in the liver parenchyma adjacent to gallbladder fossa, corresponding to areas of apparent masslike invasion seen on recent MRI, felt  to be concerning for malignant invasion of the liver.  A few subcentimeter hypodense lesions in the liver too small to characterize.  Could not exclude peritoneal invasion/carcinomatosis.  Suggestion of a patent active thyroid nodule.  Radiologist recommended correlation with thyroid ultrasound.  Pretreatment CA 19-9 and CEA normal  3/13/2023: Begin Gemzar 1000 mg/m2 D1, D8 and cisplatin 25 mg/m2 D1, D8 and durvalumab 1500 mg flat dose.  Cycles every 3 weeks.  Up to 8 cycles.  This can be followed by durvalumab 1500 mg flat dose every 4 weeks   (if this cannot be resected, then the Topaz 1 trial continue durvalumab until progression or toxicity.  If this can be resected, likely would not continue single agent durvalumab).  3/20/2023, C1 D8: AST 86, .  Stop atorvastatin that was resumed at discharge.  Hold Gemzar.  Give cisplatin.  3/27/2023: AST 25, ALT 57.  With improvement in LFTs after holding Gemzar, thought to Gemzar likely the cause.  However:  4/3/23, C2 D1: AST up to 116, ALT up to 213.  Held Gemzar again (per DrMarcial #2).  The thought was if further elevation in LFTs may need to consider steroids for durvalumab liver toxicity.  4/10/23, C2 D8: AST 18, ALT 49.  Gemzar given at 25% dose reduction.  5/1/2023: C3d 8, one-time dose reduction of Gemzar by another 25% due to .  Maintain same dose cisplatin.  Add Neulasta if insurance will allow  (Was having mild increase in tinnitus but this is tolerable.  He understands cisplatin can significantly and permanently worsen tinnitus.  He denies hearing loss.  He understands this risk and wants to continue cisplatin at same dose.  PET 5/8/2023 (after C3d8): A few new pulmonary nodules.  Cluster of index nodules, RLL, up to 0.8 cm.  Index sub-6 mm nodule RUL also new.  Radiologist notes the pulmonary nodules are nonspecific but in context are concerning for lung metastasis.  Gastrohepatic node 1.4 cm from 0.8 cm with SUV 6.7, from 3.9.  Raven hepatic node 2.7  cm, unchanged in size, but SUV 8.8, from 7.4.  The liver along the gallbladder fossa with SUV 7.8, from 9.5, the size of the hypermetabolic activity is unchanged, 5.1 x 3.5 cm.  Smaller area of uptake above that of the adjacent hepatic parenchyma, SUV 3, as before.  New or more defined soft tissue density nodules anterior inferior to the central liver.  Index lesion inferior to the gallbladder fossa now much more defined, 1.4 x 0.9 cm with SUV 3.3.  Index lesion along anterior lateral aspect of central liver new, 0.7 cm.  Radiologist notes this suggests peritoneal carcinomatosis.  This seems mostly consistent with progression.  NCCN options include FOLFOX, Gemzar Abraxane, FOLFIRI, Regorafenib.  FOLFOX is preferred  5/12/2023: Dr. Alex Delgado agrees with me: PET is consistent with progression.  He is no longer felt to be a surgical candidate.  Patient understands this is no longer felt to be curable.  Progressed on Gemzar cisplatin durvalumab.  Therefore, per NCCN guidelines switch to FOLFOX.  He requests 2 weeks break before beginning FOLFOX to try and improve the fatigue that worsened towards the end of his last chemo regimen.  5/16/2023: Patient wants a break from chemotherapy and does not want to begin FOLFOX.  He would like observation and 1 significant growth is seen on scans he will consider FOLFIRI.  He would like to avoid Oxaliplatin because he is worried about developing neuropathy.  CT 7/10/2023: Progression.  Raven hepatis mass larger, causing common hepatic duct obstruction with mild to moderate by lobar intrahepatic biliary dilation.  Thrombus main portal vein extending to the splenoportal confluence.  Discussed with radiologist, she thinks this is direct extension of tumor (tumor thrombus).  7/17/2023: Discussed options including comfort care with hospice versus chemotherapy.  I explained NCCN guidelines prefer FOLFOX.  I explained since no response to initial chemo, and this tumor is typically not  very responsive to chemo, the chances of response to further chemo is low.  After long discussion, including his wife, he has decided to try FOLFOX.  He states he will be quick to stop it if he has too many side effects  7/31/2023 cycle 2 FOLFOX, overall tolerating well so far.  8/14/2023 with cycle 3 FOLFOX.  Patient called complaining of multiple side effects of treatment and wants to discontinue treatment.  Seen 8/21/2023 with complaints of worsening weakness, as well as several complaints.  His sodium level is decreased today and you will receive IV fluids as discussed below.  He is scheduled for reassessment scans on 9/7/2023.    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  7/31/2023 continues on Eliquis 5 mg twice daily, tolerating well.    *Suggestion of PET active thyroid nodule on PET 3/2/2023.  PET 3/2/2023: Radiologist recommended thyroid ultrasound.    3/7/2023: Ordered thyroid ultrasound (but patient understands even if something looks concerning, addressed the gallbladder cancer first and only work on the thyroid nodule if gallbladder cancer treatment has completed and is felt to be in remission  Thyroid ultrasound 3/8/2023: TI-RADS 3.  Radiologist stated consider 1 year follow-up if clinically indicated.  5/12/2023: Gallbladder cancer is now metastatic (and noncurable).  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  Did not worsen.  No issues otherwise.  7/14/2023: Planning FOLFOX.  He understands this is expected to worsen his neuropathy  8/14/2023 denies issues with worsening neuropathy.    *Borderline neutropenia  8/14/2023, WBC 1.54, ANC 1.13  Counts are acceptable for treatment today though we did discuss WBC and ANC trending  downward  We will submit for Neulasta on body to utilize with future cycles of chemotherapy to avoid delays  The patient understands to avoid crowds, good hand hygiene and call for fevers or chills, signs or symptoms of infection  8/22/2023 WBC 2.94, ANC 1.52.  8/24/2023 WBC 3.53, ANC 2.01.    *Constipation  Previously utilizing Senokot S2 tablets twice daily and MiraLAX twice daily  Lactulose was added though this resulted in significant abdominal constipation  We discussed today discontinuing lactulose, resuming Senokot 2 tablets twice daily and MiraLAX twice daily and utilizing milk of magnesium.  We will also discontinue Zofran as this is likely exacerbating his constipation and instead utilize prochlorperazine for breakthrough nausea  8/22/2023 Using Senokot-S,  3 tablets twice a day and occasional MiraLAX and reports good control of his constipation with this regimen.  8/24/2023 reports improvement in his bowel function today.    *Hyponatremia: 8/22/2023 sodium level 127, likely contributing to patient's weakness.  Patient will receive IV fluids, 1 L of normal saline today.  Encouraged to increase his sodium intake at home.    8/24/2023 sodium level improved to 130.  Patient is feeling much better.  We will go ahead and again proceed with 1 L of IV normal saline.  Patient will be reevaluated on Monday, 8/28/2023.    *Thrush: complaining of mouth sores.  On exam he is noted to have patchy oral thrush.  Patient will be started on Mycelex and advised to start Magic mouthwash as soon as he is able to  the prescription.  Magic mouthwash made him sick as far as causing nausea and vomiting.  On exam it does appear his mouth has improved today.  We will continue to monitor.      Plan  1 L IV normal saline today.  Patient will return for follow-up on 8/28/2023 for follow-up with nurse practitioner with repeat CBC, stat CMP.  Currently scheduled for CT scan 9/7/2023, and the patient would like to keep that  appointment to reevaluate his disease.  Follow-up with Dr. Chaney for scan review 9/11/2023.    Continue bowel regimen if needed.  Continue Eliquis 5 mg twice daily.  Call/ return sooner should the patient develop any new concerns or problems.    Patient is on a high risk medication requiring close monitoring for toxicity.      Viviana Bell, APRN  08/24/2023

## 2023-08-28 ENCOUNTER — OFFICE VISIT (OUTPATIENT)
Dept: ONCOLOGY | Facility: CLINIC | Age: 73
End: 2023-08-28
Payer: MEDICARE

## 2023-08-28 ENCOUNTER — LAB (OUTPATIENT)
Dept: LAB | Facility: HOSPITAL | Age: 73
End: 2023-08-28
Payer: MEDICARE

## 2023-08-28 VITALS
WEIGHT: 161.8 LBS | SYSTOLIC BLOOD PRESSURE: 119 MMHG | BODY MASS INDEX: 24.52 KG/M2 | RESPIRATION RATE: 18 BRPM | TEMPERATURE: 98.2 F | OXYGEN SATURATION: 99 % | HEART RATE: 93 BPM | HEIGHT: 68 IN | DIASTOLIC BLOOD PRESSURE: 80 MMHG

## 2023-08-28 DIAGNOSIS — C23 GALLBLADDER CANCER: ICD-10-CM

## 2023-08-28 DIAGNOSIS — C23 GALLBLADDER CANCER: Primary | ICD-10-CM

## 2023-08-28 DIAGNOSIS — R79.89 ABNORMAL LFTS: ICD-10-CM

## 2023-08-28 LAB
ALBUMIN SERPL-MCNC: 3.8 G/DL (ref 3.5–5.2)
ALBUMIN/GLOB SERPL: 1.5 G/DL
ALP SERPL-CCNC: 50 U/L (ref 39–117)
ALT SERPL W P-5'-P-CCNC: 34 U/L (ref 1–41)
ANION GAP SERPL CALCULATED.3IONS-SCNC: 13.4 MMOL/L (ref 5–15)
AST SERPL-CCNC: 75 U/L (ref 1–40)
BASOPHILS # BLD AUTO: 0 10*3/MM3 (ref 0–0.2)
BASOPHILS NFR BLD AUTO: 0 % (ref 0–1.5)
BILIRUB SERPL-MCNC: 0.8 MG/DL (ref 0–1.2)
BUN SERPL-MCNC: 13 MG/DL (ref 8–23)
BUN/CREAT SERPL: 10.1 (ref 7–25)
CALCIUM SPEC-SCNC: 9.3 MG/DL (ref 8.6–10.5)
CHLORIDE SERPL-SCNC: 98 MMOL/L (ref 98–107)
CO2 SERPL-SCNC: 21.6 MMOL/L (ref 22–29)
CREAT SERPL-MCNC: 1.29 MG/DL (ref 0.7–1.3)
DEPRECATED RDW RBC AUTO: 48.2 FL (ref 37–54)
EGFRCR SERPLBLD CKD-EPI 2021: 57.8 ML/MIN/1.73
EOSINOPHIL # BLD AUTO: 0 10*3/MM3 (ref 0–0.4)
EOSINOPHIL NFR BLD AUTO: 0 % (ref 0.3–6.2)
ERYTHROCYTE [DISTWIDTH] IN BLOOD BY AUTOMATED COUNT: 15.5 % (ref 12.3–15.4)
GLOBULIN UR ELPH-MCNC: 2.5 GM/DL
GLUCOSE SERPL-MCNC: 174 MG/DL (ref 65–99)
HCT VFR BLD AUTO: 30.8 % (ref 37.5–51)
HGB BLD-MCNC: 10.6 G/DL (ref 13–17.7)
IMM GRANULOCYTES # BLD AUTO: 0.01 10*3/MM3 (ref 0–0.05)
IMM GRANULOCYTES NFR BLD AUTO: 0.5 % (ref 0–0.5)
LYMPHOCYTES # BLD AUTO: 1.08 10*3/MM3 (ref 0.7–3.1)
LYMPHOCYTES NFR BLD AUTO: 50.5 % (ref 19.6–45.3)
MCH RBC QN AUTO: 31.6 PG (ref 26.6–33)
MCHC RBC AUTO-ENTMCNC: 34.4 G/DL (ref 31.5–35.7)
MCV RBC AUTO: 91.9 FL (ref 79–97)
MONOCYTES # BLD AUTO: 0.35 10*3/MM3 (ref 0.1–0.9)
MONOCYTES NFR BLD AUTO: 16.4 % (ref 5–12)
NEUTROPHILS NFR BLD AUTO: 0.7 10*3/MM3 (ref 1.7–7)
NEUTROPHILS NFR BLD AUTO: 32.6 % (ref 42.7–76)
NRBC BLD AUTO-RTO: 0 /100 WBC (ref 0–0.2)
PLATELET # BLD AUTO: 144 10*3/MM3 (ref 140–450)
PMV BLD AUTO: 8 FL (ref 6–12)
POTASSIUM SERPL-SCNC: 3.4 MMOL/L (ref 3.5–5.2)
PROT SERPL-MCNC: 6.3 G/DL (ref 6–8.5)
RBC # BLD AUTO: 3.35 10*6/MM3 (ref 4.14–5.8)
SODIUM SERPL-SCNC: 133 MMOL/L (ref 136–145)
WBC NRBC COR # BLD: 2.14 10*3/MM3 (ref 3.4–10.8)

## 2023-08-28 PROCEDURE — 36415 COLL VENOUS BLD VENIPUNCTURE: CPT

## 2023-08-28 PROCEDURE — 80053 COMPREHEN METABOLIC PANEL: CPT

## 2023-08-28 PROCEDURE — 85025 COMPLETE CBC W/AUTO DIFF WBC: CPT

## 2023-08-28 NOTE — PROGRESS NOTES
.     REASON FOR FOLLOWUP :   Squamous cell carcinoma of the gallbladder    HISTORY OF PRESENT ILLNESS:  The patient is a 73 y.o. year old male who is here today for short interval follow-up and lab review.  He reports that he continues to improve.  He is decided to discontinue chemotherapy as he did not like he tolerated it well.  We have been closely watching his labs as he did have a drop in his sodium level and required some IV hydration.  Patient was also instructed to increase his sodium intake at home.  He reports he is feeling much better.  His mouth sores have almost completely resolved using baking soda and salt water rinses.  He has had some episodes of diarrhea but nothing that necessitated Imodium.  Patient does state some pain and he feels like worsening neuropathy in his lower extremities.  He states he has been taking Tylenol 500 mg 1 tablet daily.  He is no longer taking Lipitor, and states that he has not had alcohol since February.        Past Medical History:   Diagnosis Date    Cancer     gallbladder    Gilbert syndrome     Hyperlipidemia     Hypertension      Past Surgical History:   Procedure Laterality Date    CHOLECYSTECTOMY WITH INTRAOPERATIVE CHOLANGIOGRAM N/A 02/11/2023    Procedure: CHOLECYSTECTOMY LAPAROSCOPIC INTRAOPERATIVE CHOLANGIOGRAM;  Surgeon: Vianey Horn MD;  Location: [facility];  Service: General;  Laterality: N/A;    COLONOSCOPY      PILONIDAL CYST DRAINAGE      VENOUS ACCESS DEVICE (PORT) INSERTION N/A 3/9/2023    Procedure: INSERTION VENOUS ACCESS DEVICE;  Surgeon: Vianey Horn MD;  Location: [facility];  Service: General;  Laterality: N/A;       MEDICATIONS    Current Outpatient Medications:     apixaban (ELIQUIS) 5 MG tablet tablet, Take 1 tablet by mouth 2 (Two) Times a Day., Disp: 56 tablet, Rfl: 0    lisinopril (PRINIVIL,ZESTRIL) 20 MG tablet, Take 1 tablet by mouth Daily., Disp: , Rfl:     ondansetron (ZOFRAN) 8 MG tablet, Take 1 tablet by mouth 3  "(Three) Times a Day As Needed for Nausea or Vomiting., Disp: 30 tablet, Rfl: 5    prochlorperazine (COMPAZINE) 10 MG tablet, Take 1 tablet by mouth Every 6 (Six) Hours As Needed for Nausea. (Patient not taking: Reported on 8/28/2023), Disp: 30 tablet, Rfl: 2    ALLERGIES:   No Known Allergies    SOCIAL HISTORY:       Social History     Socioeconomic History    Marital status:      Spouse name: Oly   Tobacco Use    Smoking status: Former     Types: Cigarettes   Vaping Use    Vaping Use: Never used   Substance and Sexual Activity    Alcohol use: Yes    Drug use: Never    Sexual activity: Defer         FAMILY HISTORY:  Family History   Problem Relation Age of Onset    Malig Hyperthermia Neg Hx        REVIEW OF SYSTEMS:  Review of Systems  ROS as per HPI         Vitals:    08/28/23 1351   BP: 119/80   Pulse: 93   Resp: 18   Temp: 98.2 øF (36.8 øC)   TempSrc: Temporal   SpO2: 99%   Weight: 73.4 kg (161 lb 12.8 oz)   Height: 173 cm (68.11\")   PainSc: 0-No pain           8/28/2023     1:53 PM   Current Status   ECOG score 0      Physical Exam  Vitals and nursing note reviewed.   Constitutional:       Appearance: Normal appearance. He is well-developed.   HENT:      Head: Normocephalic and atraumatic.      Nose: Nose normal.   Eyes:      Pupils: Pupils are equal, round, and reactive to light.   Cardiovascular:      Rate and Rhythm: Normal rate and regular rhythm.      Heart sounds: Normal heart sounds.   Pulmonary:      Effort: Pulmonary effort is normal. No respiratory distress.      Breath sounds: Normal breath sounds. No wheezing, rhonchi or rales.   Abdominal:      General: Bowel sounds are normal. There is no distension.      Palpations: Abdomen is soft.      Tenderness: There is no abdominal tenderness.   Musculoskeletal:         General: Normal range of motion.      Cervical back: Normal range of motion and neck supple.   Skin:     General: Skin is warm and dry.   Neurological:      Mental Status: He is " alert and oriented to person, place, and time.   Psychiatric:         Behavior: Behavior normal.     I have reexamined the patient and the results are consistent with the previously documented exam. TY Christy         RECENT LABS:  Results from last 7 days   Lab Units 08/28/23  1337 08/24/23  1259 08/22/23  1436   WBC 10*3/mm3 2.14* 3.53 2.94*   NEUTROS ABS 10*3/mm3 0.70* 2.01 1.52*   HEMOGLOBIN g/dL 10.6* 10.8* 11.3*   HEMATOCRIT % 30.8* 30.7* 32.4*   PLATELETS 10*3/mm3 144 135* 154       Results from last 7 days   Lab Units 08/28/23  1337 08/24/23  1259 08/22/23  1436   SODIUM mmol/L 133* 130* 127*   POTASSIUM mmol/L 3.4* 3.6 3.5   CHLORIDE mmol/L 98 97* 94*   CO2 mmol/L 21.6* 21.4* 20.5*   BUN mg/dL 13 18 17   CREATININE mg/dL 1.29 0.95 1.05   CALCIUM mg/dL 9.3 9.0 9.0   ALBUMIN g/dL 3.8 3.9 4.2   BILIRUBIN mg/dL 0.8 0.5 0.8   ALK PHOS U/L 50 61 64   ALT (SGPT) U/L 34 19 17   AST (SGOT) U/L 75* 42* 38   GLUCOSE mg/dL 174* 145* 175*   MAGNESIUM mg/dL  --   --  1.8             Assessment & Plan   Gallbladder cancer    Abnormal LFTs        Guillermo Salgado   *Squamous cell carcinoma of the gallbladder  Incidentally found on path review from cholecystectomy.  Due to symptomatic cholelithiasis, laparoscopic cholecystectomy 2/11/2023, Dr. 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  5 cm.  Invades perimuscular connective tissue.  Negative for lymph-vascular invasion.  Positive for focal perineural invasion.  Cystic duct margin negative.  Grade 1.  Margins were felt to be negative per pathologist.  Pathologist stage this as a vA1rmTg cancer.  Dr. Horn stated the gallbladder was significantly inflamed and came out in fragments so she is not sure that staging is entirely clinically accurate.  She noted she also sent the patient to Dr. Alex Delgado of hepatobiliary surgery to determine if patient needs formal lymphadenectomy/liver resection.  Per NCCN guidelines: Needs  multiphase abdomen/pelvis CT or MRI with IV contrast and CT chest with or without IV contrast.  Consider staging laparoscopy.  If felt to be resectable, then hepatic resection with lymphadenectomy with or without bile duct excision for malignant involvement.  If felt to be unresectable, then MSI/MMR testing, TMB testing, additional molecular testing to include an TRK.  Options include systemic therapy (preferred), clinical trial (preferred), palliative XRT, or best supportive care.  Per up-to-date guidelines, for a T2 cancer found incidentally after gallbladder surgery: Reexploration and extended cholecystectomy are also indicated.  Re-exploration identifies residual tumor in 40 to 75% of cases, and a high likelihood of liver involvement and jacki metastasis with T2 disease.  Re-resection significantly increases the likelihood of long-term DFS in patients with T2 disease.  In many series, 5-year OS increased from 25-40%, up to % with aggressive surgery.  Up-to-date authors recommend re-resection to resect at least a 2 cm margin of the liver bed and perform portal/hepatoduodenal ligament lymphadenectomy.  Up-to-date authors recommend adjuvant therapy for all patients with completely resected T1b or higher or node positive or margin positive gallbladder cancer.  Up-to-date authors note [facility] suggests adjuvant therapy for all patients with resected gallbladder cancer.  Up-to-date authors note there is no consensus on the optimal adjuvant therapy but they recommend 6 months of postoperative chemotherapy alone with Xeloda monotherapy for most patients.  They note if Xeloda is chosen they start treatment with no more than 1500 mg total dose twice daily.  They note an alternative approaches combining concomitant 5-FU based chemoradiotherapy with 4 months of systemic chemotherapy especially with patients with margin positive disease.  They note in this situation chemotherapy can be with Xeloda monotherapy or Xeloda  plus Gemzar.  They note for patients who received chemo XRT plus adjuvant chemo, there is no consensus on sequencing.  In general they state it is preferable to start with chemotherapy first, complete 3 to 4 months of systemic exposure because this will avoid XRT for patients who are destined to develop early distant disease.  Dr. Alex Delgado, surgical oncology, states although LAD not read on MRI 2/10/2023, he sees bulky LAD on his imaging review.  He states this is currently not resectable due to the bulky LAD and requests chemotherapy in hopes this will become resectable.  He requests avoiding XRT.  Per the Topaz 1 trial, plan Gemzar 1000 mg/m2 D1, D8 and cisplatin 25 mg/m2 D1, D8 and durvalumab 1500 mg flat dose.  Cycles every 3 weeks.  Up to 8 cycles.  This can be followed by durvalumab 1500 mg flat dose every 4 weeks.  Durvalumab was associated with a longer PFS and a higher objective response rate (26.7% versus 18.7% without durvalumab).  The Topaz 1 trial included previously untreated unresectable locally advanced or metastatic intrahepatic or extrahepatic cholangiocarcinoma or gallbladder cancer.  Also, cisplatin and Gemzar is a common regimen in squamous cell carcinoma of a more common primary (lung).  Therefore, I feel cisplatin Gemzar durvalumab is the best regimen for this currently unresectable squamous cell carcinoma of the gallbladder.  Dr. Delgado is recommended a prechemotherapy PET scan and then to check a PET scan again after roughly 2 months of chemotherapy to see if there has been enough improvement to see if this is resectable.  PET 3/2/2023: Intensely active tanner hepatis nodes, and ill-defined hypodensity in the liver parenchyma adjacent to gallbladder fossa, corresponding to areas of apparent masslike invasion seen on recent MRI, felt to be concerning for malignant invasion of the liver.  A few subcentimeter hypodense lesions in the liver too small to characterize.  Could not exclude  peritoneal invasion/carcinomatosis.  Suggestion of a patent active thyroid nodule.  Radiologist recommended correlation with thyroid ultrasound.  Pretreatment CA 19-9 and CEA normal  3/13/2023: Begin Gemzar 1000 mg/m2 D1, D8 and cisplatin 25 mg/m2 D1, D8 and durvalumab 1500 mg flat dose.  Cycles every 3 weeks.  Up to 8 cycles.  This can be followed by durvalumab 1500 mg flat dose every 4 weeks   (if this cannot be resected, then the Topaz 1 trial continue durvalumab until progression or toxicity.  If this can be resected, likely would not continue single agent durvalumab).  3/20/2023, C1 D8: AST 86, .  Stop atorvastatin that was resumed at discharge.  Hold Gemzar.  Give cisplatin.  3/27/2023: AST 25, ALT 57.  With improvement in LFTs after holding Gemzar, thought to Gemzar likely the cause.  However:  4/3/23, C2 D1: AST up to 116, ALT up to 213.  Held Gemzar again (per DrMarcial #2).  The thought was if further elevation in LFTs may need to consider steroids for durvalumab liver toxicity.  4/10/23, C2 D8: AST 18, ALT 49.  Gemzar given at 25% dose reduction.  5/1/2023: C3d 8, one-time dose reduction of Gemzar by another 25% due to .  Maintain same dose cisplatin.  Add Neulasta if insurance will allow  (Was having mild increase in tinnitus but this is tolerable.  He understands cisplatin can significantly and permanently worsen tinnitus.  He denies hearing loss.  He understands this risk and wants to continue cisplatin at same dose.  PET 5/8/2023 (after C3d8): A few new pulmonary nodules.  Cluster of index nodules, RLL, up to 0.8 cm.  Index sub-6 mm nodule RUL also new.  Radiologist notes the pulmonary nodules are nonspecific but in context are concerning for lung metastasis.  Gastrohepatic node 1.4 cm from 0.8 cm with SUV 6.7, from 3.9.  Raven hepatic node 2.7 cm, unchanged in size, but SUV 8.8, from 7.4.  The liver along the gallbladder fossa with SUV 7.8, from 9.5, the size of the hypermetabolic activity is  unchanged, 5.1 x 3.5 cm.  Smaller area of uptake above that of the adjacent hepatic parenchyma, SUV 3, as before.  New or more defined soft tissue density nodules anterior inferior to the central liver.  Index lesion inferior to the gallbladder fossa now much more defined, 1.4 x 0.9 cm with SUV 3.3.  Index lesion along anterior lateral aspect of central liver new, 0.7 cm.  Radiologist notes this suggests peritoneal carcinomatosis.  This seems mostly consistent with progression.  NCCN options include FOLFOX, Gemzar Abraxane, FOLFIRI, Regorafenib.  FOLFOX is preferred  5/12/2023: Dr. Alex Delgado agrees with me: PET is consistent with progression.  He is no longer felt to be a surgical candidate.  Patient understands this is no longer felt to be curable.  Progressed on Gemzar cisplatin durvalumab.  Therefore, per NCCN guidelines switch to FOLFOX.  He requests 2 weeks break before beginning FOLFOX to try and improve the fatigue that worsened towards the end of his last chemo regimen.  5/16/2023: Patient wants a break from chemotherapy and does not want to begin FOLFOX.  He would like observation and 1 significant growth is seen on scans he will consider FOLFIRI.  He would like to avoid Oxaliplatin because he is worried about developing neuropathy.  CT 7/10/2023: Progression.  Raven hepatis mass larger, causing common hepatic duct obstruction with mild to moderate by lobar intrahepatic biliary dilation.  Thrombus main portal vein extending to the splenoportal confluence.  Discussed with radiologist, she thinks this is direct extension of tumor (tumor thrombus).  7/17/2023: Discussed options including comfort care with hospice versus chemotherapy.  I explained NCCN guidelines prefer FOLFOX.  I explained since no response to initial chemo, and this tumor is typically not very responsive to chemo, the chances of response to further chemo is low.  After long discussion, including his wife, he has decided to try FOLFOX.  He  states he will be quick to stop it if he has too many side effects  7/31/2023 cycle 2 FOLFOX, overall tolerating well so far.  8/14/2023 with cycle 3 FOLFOX.  Patient called complaining of multiple side effects of treatment and wants to discontinue treatment.  Seen 8/21/2023 with complaints of worsening weakness, as well as several complaints.  His sodium level is decreased today and you will receive IV fluids as discussed below.  He is scheduled for reassessment scans on 9/7/2023.    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  7/31/2023 continues on Eliquis 5 mg twice daily, tolerating well.    *Suggestion of PET active thyroid nodule on PET 3/2/2023.  PET 3/2/2023: Radiologist recommended thyroid ultrasound.    3/7/2023: Ordered thyroid ultrasound (but patient understands even if something looks concerning, addressed the gallbladder cancer first and only work on the thyroid nodule if gallbladder cancer treatment has completed and is felt to be in remission  Thyroid ultrasound 3/8/2023: TI-RADS 3.  Radiologist stated consider 1 year follow-up if clinically indicated.  5/12/2023: Gallbladder cancer is now metastatic (and noncurable).  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  Did not worsen.  No issues otherwise.  7/14/2023: Planning FOLFOX.  He understands this is expected to worsen his neuropathy  8/14/2023 denies issues with worsening neuropathy.  Feels like the neuropathy in legs is worsening. Will try adding B complex.     *Borderline neutropenia  8/14/2023, WBC 1.54, ANC 1.13  Counts are acceptable for treatment today though we did discuss WBC and ANC trending downward  We will submit for Neulasta on body to utilize with future cycles of  chemotherapy to avoid delays  The patient understands to avoid crowds, good hand hygiene and call for fevers or chills, signs or symptoms of infection  8/22/2023 WBC 2.94, ANC 1.52.  8/24/2023 WBC 3.53, ANC 2.01.  8/28/2023 WBC 2.14, ANC 0.70- review neutropenic precautions.     *Constipation  Previously utilizing Senokot S2 tablets twice daily and MiraLAX twice daily  Lactulose was added though this resulted in significant abdominal constipation  We discussed today discontinuing lactulose, resuming Senokot 2 tablets twice daily and MiraLAX twice daily and utilizing milk of magnesium.  We will also discontinue Zofran as this is likely exacerbating his constipation and instead utilize prochlorperazine for breakthrough nausea  8/22/2023 Using Senokot-S,  3 tablets twice a day and occasional MiraLAX and reports good control of his constipation with this regimen.  8/24/2023 reports improvement in his bowel function today.  8/28/2023 actually reports some episodes of diarrhea, so far not necessitated Imodium.    *Hyponatremia: 8/22/2023 sodium level 127, likely contributing to patient's weakness.  Patient will receive IV fluids, 1 L of normal saline today.  Encouraged to increase his sodium intake at home.    8/24/2023 sodium level improved to 130.  Patient is feeling much better.  We will go ahead and again proceed with 1 L of IV normal saline.  Patient will be reevaluated on Monday, 8/28/2023.  8/28/2023 sodium level 133.  Patient will increase salt intake in his diet and we will closely monitor.    *Thrush: complaining of mouth sores.  On exam he is noted to have patchy oral thrush.  Patient will be started on Mycelex and advised to start Magic mouthwash as soon as he is able to  the prescription.  Magic mouthwash made him sick as far as causing nausea and vomiting.  On exam it does appear his mouth has improved today.  We will continue to monitor.  Resolved    *Elevated AST:  Patient no longer taking  atorvastatin, he is taking Tylenol, one 500 mg dose per day, and denies any alcohol use.  AST today 75, ALT 34, alkaline phosphatase 50, total bilirubin 0.8.  He does have follow-up scans scheduled for next week.    *Hypokalemia:  Mild potassium 3.4 today.  Patient will increase his potassium containing foods and we will continue to monitor.      Plan  Patient will increase dietary intake of potassium containing and sodium containing foods.  Minimize Tylenol and avoid alcohol use.    Neutropenic precautions reviewed with the patient.  Patient is going to start B complex to see if this helps improve neuropathy symptoms.    Scheduled for follow-up CT scan on 9/7/2023 with repeat CBC and CMP that day as well.  Follow-up with Dr. Benedict for scan review 9/11/2023.    Continue Eliquis 5 mg twice daily.    Call/ return sooner should the patient develop any new concerns or problems.    Patient is on a high risk medication requiring close monitoring for toxicity.      Viviana Bell, TY  08/28/2023

## 2023-09-01 ENCOUNTER — TELEPHONE (OUTPATIENT)
Dept: ONCOLOGY | Facility: CLINIC | Age: 73
End: 2023-09-01
Payer: MEDICARE

## 2023-09-01 ENCOUNTER — DOCUMENTATION (OUTPATIENT)
Dept: ONCOLOGY | Facility: CLINIC | Age: 73
End: 2023-09-01
Payer: MEDICARE

## 2023-09-01 NOTE — TELEPHONE ENCOUNTER
Provider: Dr Benedict  Caller: Guillermo Salgado  Relationship to Patient: Self  Call Back Phone Number: [phone]  Reason for Call: Pt stated he's still has constipation, stated Miralax is not working. Wants to make sure everything is OK before the holiday weekend

## 2023-09-01 NOTE — PROGRESS NOTES
It looks like recently has been noted in the chart recommending to prescribe lactulose.  However, when he called me he did not say anything about taking lactulose.  Lactulose works similarly to MiraLAX.  We will place the MiraLAX with lactulose.  I would take 20 g 3 times per day until he has a bowel movement.  He can also take an over-the-counter fleets enema.  I did notice the neutropenia from 8/28/2023 with an ANC of 700.  That is why I did not recommend an enema when I talked to him on the phone on Wednesday.  However, hopefully that neutropenia has passed since it has been more time since his last chemotherapy.  If he does not have the lactulose, please prescribe.  Please also tell him to get some fleets enemas he can use an enema twice per day and the lactulose 3 times per day.  He does not have a bowel movement with this regimen within a couple days, he probably should go to the ER for more assistance.  After he has a bowel movement and the cramping resolves, I do think he should try to get back on Senokot-S.  Also, if during these couple of days of holding Senokot-S the abdominal cramping has not improved any then it might be best to also add back in the Senokot-S 2 tablets twice per day (in addition to all the above)..  ===View-only below this line===  ----- Message -----  From: Mary Morton RN  Sent: 9/1/2023   9:38 AM EDT  To: MD Dr. Jak Srivastava II,     Patient called and he states he talked with you he thinks Wednesday evening (you were on call) and since he was having bad abdominal cramping he was instructed to stop the senna s and that he was instructed to start Miralax BID and he has done that. He states he is on 5 days since his last BM without any sign of improvement and he is worried about it being a long weekend and wanted to know If we had other recommendations.     Thank you   ----- Message -----  From: Gissel Carpenter RegSched Rep  Sent: 9/1/2023   8:10 AM EDT  To: April On[facility]  [facility] [place]

## 2023-09-01 NOTE — TELEPHONE ENCOUNTER
Called patient back and he states he talked with Dr. Benedict he thinks Wednesday evening and since he was having bad abdominal cramping he was instructed to stop the senna s and that he was instructed to start Miralax BID and he has done that. He states he is on 5 days since his last BM without any sign of improvement and he is worried about it being a long weekend and wanted to know If we had other recommendations. I let him know I would alk with Dr. Benedict and get back with him. Patient v/u.

## 2023-09-01 NOTE — TELEPHONE ENCOUNTER
Called the patient to ask him if he still has lactulose and he stated he could not take that because the abdominal cramps were too bad with that as well. I let him know Dr. Abad other recommendation was to continue with the Miralax BID and that he could get OTC fleets enema and could do it BID. I let him know that if he did not have a BM in a couple days that he should be evaluated in the ER. Patient v/u.

## 2023-09-07 ENCOUNTER — HOSPITAL ENCOUNTER (OUTPATIENT)
Dept: CT IMAGING | Facility: HOSPITAL | Age: 73
Discharge: HOME | End: 2023-09-07
Admitting: INTERNAL MEDICINE
Payer: MEDICARE

## 2023-09-07 DIAGNOSIS — C23 GALLBLADDER CANCER: ICD-10-CM

## 2023-09-07 PROCEDURE — 71260 CT THORAX DX C+: CPT

## 2023-09-07 PROCEDURE — 0 DIATRIZOATE MEGLUMINE & SODIUM PER 1 ML: Performed by: INTERNAL MEDICINE

## 2023-09-07 PROCEDURE — 74177 CT ABD & PELVIS W/CONTRAST: CPT

## 2023-09-07 PROCEDURE — 25510000001 IOPAMIDOL 61 % SOLUTION: Performed by: INTERNAL MEDICINE

## 2023-09-07 RX ADMIN — DIATRIZOATE MEGLUMINE AND DIATRIZOATE SODIUM 30 ML: 660; 100 LIQUID ORAL; RECTAL at 10:30

## 2023-09-07 RX ADMIN — IOPAMIDOL 95 ML: 612 INJECTION, SOLUTION INTRAVENOUS at 11:19

## 2023-09-08 NOTE — PROGRESS NOTES
.     REASON FOR FOLLOWUP :   Squamous cell carcinoma of the gallbladder    HISTORY OF PRESENT ILLNESS:  The patient is a 73 y.o. year old male  who is here for follow-up with the above-mentioned history.    He decided to stop chemotherapy.  He did this mainly because of significant fatigue which she attributed to chemotherapy.  After stopping chemotherapy, he states his energy level is greatly improved.  He is back to playing golf and feeling good again.  He is glad about his decision.  Currently, no nausea, no pain.    Past Medical History:   Diagnosis Date    Cancer     gallbladder    Gilbert syndrome     Hyperlipidemia     Hypertension      Past Surgical History:   Procedure Laterality Date    CHOLECYSTECTOMY WITH INTRAOPERATIVE CHOLANGIOGRAM N/A 02/11/2023    Procedure: CHOLECYSTECTOMY LAPAROSCOPIC INTRAOPERATIVE CHOLANGIOGRAM;  Surgeon: Vianey Horn MD;  Location: [facility] MAIN OR;  Service: General;  Laterality: N/A;    COLONOSCOPY      PILONIDAL CYST DRAINAGE      VENOUS ACCESS DEVICE (PORT) INSERTION N/A 3/9/2023    Procedure: INSERTION VENOUS ACCESS DEVICE;  Surgeon: Vianey Horn MD;  Location: [facility] OR [facility];  Service: General;  Laterality: N/A;       MEDICATIONS    Current Outpatient Medications:     apixaban (ELIQUIS) 5 MG tablet tablet, Take 1 tablet by mouth 2 (Two) Times a Day., Disp: 56 tablet, Rfl: 0    lisinopril (PRINIVIL,ZESTRIL) 20 MG tablet, Take 1 tablet by mouth Daily., Disp: , Rfl:     ondansetron (ZOFRAN) 8 MG tablet, Take 1 tablet by mouth 3 (Three) Times a Day As Needed for Nausea or Vomiting., Disp: 30 tablet, Rfl: 5    ALLERGIES:   No Known Allergies    SOCIAL HISTORY:       Social History     Socioeconomic History    Marital status:      Spouse name: Oly   Tobacco Use    Smoking status: Former     Types: Cigarettes   Vaping Use    Vaping Use: Never used   Substance and Sexual Activity    Alcohol use: Yes    Drug use: Never    Sexual activity: Defer        "  FAMILY HISTORY:  Family History   Problem Relation Age of Onset    Malig Hyperthermia Neg Hx        REVIEW OF SYSTEMS:  Review of Systems   Constitutional:  Negative for activity change.   HENT:  Negative for nosebleeds and trouble swallowing.    Respiratory:  Negative for shortness of breath and wheezing.    Cardiovascular:  Negative for chest pain and palpitations.   Gastrointestinal:  Negative for diarrhea and nausea.   Genitourinary:  Negative for dysuria and hematuria.   Musculoskeletal:  Negative for arthralgias and myalgias.   Neurological:  Negative for seizures and syncope.   Hematological:  Negative for adenopathy. Does not bruise/bleed easily.   Psychiatric/Behavioral:  Negative for confusion.             Vitals:    09/11/23 0824   BP: 125/83   Pulse: 71   Resp: 16   Temp: 97.7 °F (36.5 °C)   TempSrc: Temporal   SpO2: 98%   Weight: 73.9 kg (163 lb)   Height: 173 cm (68.11\")   PainSc: 0-No pain   PainLoc: Generalized  Comment: neuropathy in toes and fingers         9/11/2023     8:28 AM   Current Status   ECOG score 0      PHYSICAL EXAM:        CONSTITUTIONAL:  Vital signs reviewed.  No distress, looks comfortable.  EYES:  Conjunctiva and lids unremarkable.  PERRLA  EARS,NOSE,MOUTH,THROAT:  Ears and nose appear unremarkable.  Lips, teeth, gums appear unremarkable.  RESPIRATORY:  Normal respiratory effort.  Lungs clear to auscultation bilaterally.  CARDIOVASCULAR:  Normal S1, S2.  No murmurs rubs or gallops.  No significant lower extremity edema.  GASTROINTESTINAL: Abdomen appears unremarkable.  Nontender.  No hepatomegaly.  No splenomegaly.  LYMPHATIC:  No cervical, supraclavicular, axillary lymphadenopathy.  SKIN:  Warm.  No rashes.  PSYCHIATRIC:  Normal judgment and insight.  Normal mood and affect.       RECENT LABS:        WBC   Date Value Ref Range Status   09/11/2023 7.97 3.40 - 10.80 10*3/mm3 Final   08/28/2023 2.14 (L) 3.40 - 10.80 10*3/mm3 Final   08/24/2023 3.53 3.40 - 10.80 10*3/mm3 Final "   08/22/2023 2.94 (L) 3.40 - 10.80 10*3/mm3 Final   08/14/2023 2.46 (L) 3.40 - 10.80 10*3/mm3 Final   07/31/2023 3.52 3.40 - 10.80 10*3/mm3 Final   07/17/2023 4.83 3.40 - 10.80 10*3/mm3 Final   07/10/2023 4.47 3.40 - 10.80 10*3/mm3 Final   05/12/2023 22.81 (H) 3.40 - 10.80 10*3/mm3 Final   05/01/2023 2.04 (L) 3.40 - 10.80 10*3/mm3 Final   04/24/2023 4.61 3.40 - 10.80 10*3/mm3 Final   04/10/2023 7.85 3.40 - 10.80 10*3/mm3 Final   04/03/2023 6.55 3.40 - 10.80 10*3/mm3 Final   03/20/2023 4.31 3.40 - 10.80 10*3/mm3 Final   03/13/2023 7.30 3.40 - 10.80 10*3/mm3 Final   03/07/2023 8.40 3.40 - 10.80 10*3/mm3 Final   02/28/2023 8.11 3.40 - 10.80 10*3/mm3 Final   02/12/2023 8.60 3.40 - 10.80 10*3/mm3 Final   02/11/2023 6.21 3.40 - 10.80 10*3/mm3 Final   02/10/2023 5.40 3.40 - 10.80 10*3/mm3 Final   02/09/2023 6.09 3.40 - 10.80 10*3/mm3 Final     Hemoglobin   Date Value Ref Range Status   09/11/2023 11.4 (L) 13.0 - 17.7 g/dL Final   08/28/2023 10.6 (L) 13.0 - 17.7 g/dL Final   08/24/2023 10.8 (L) 13.0 - 17.7 g/dL Final   08/22/2023 11.3 (L) 13.0 - 17.7 g/dL Final   08/14/2023 10.8 (L) 13.0 - 17.7 g/dL Final   07/31/2023 11.3 (L) 13.0 - 17.7 g/dL Final   07/17/2023 11.5 (L) 13.0 - 17.7 g/dL Final   07/10/2023 12.0 (L) 13.0 - 17.7 g/dL Final   05/12/2023 10.0 (L) 13.0 - 17.7 g/dL Final   05/01/2023 10.6 (L) 13.0 - 17.7 g/dL Final   04/24/2023 11.7 (L) 13.0 - 17.7 g/dL Final   04/10/2023 12.6 (L) 13.0 - 17.7 g/dL Final   04/03/2023 12.5 (L) 13.0 - 17.7 g/dL Final   03/20/2023 12.5 (L) 13.0 - 17.7 g/dL Final   03/13/2023 13.0 13.0 - 17.7 g/dL Final   03/07/2023 13.8 13.0 - 17.7 g/dL Final   02/28/2023 13.5 13.0 - 17.7 g/dL Final   02/12/2023 11.3 (L) 13.0 - 17.7 g/dL Final   02/11/2023 12.6 (L) 13.0 - 17.7 g/dL Final   02/10/2023 13.2 13.0 - 17.7 g/dL Final   02/09/2023 13.5 13.0 - 17.7 g/dL Final     Platelets   Date Value Ref Range Status   09/11/2023 157 140 - 450 10*3/mm3 Final   08/28/2023 144 140 - 450 10*3/mm3 Final    08/24/2023 135 (L) 140 - 450 10*3/mm3 Final   08/22/2023 154 140 - 450 10*3/mm3 Final   08/14/2023 139 (L) 140 - 450 10*3/mm3 Final   07/31/2023 154 140 - 450 10*3/mm3 Final   07/17/2023 207 140 - 450 10*3/mm3 Final   07/10/2023 204 140 - 450 10*3/mm3 Final   05/12/2023 143 140 - 450 10*3/mm3 Final   05/01/2023 184 140 - 450 10*3/mm3 Final   04/24/2023 223 140 - 450 10*3/mm3 Final   04/10/2023 309 140 - 450 10*3/mm3 Final   04/03/2023 439 140 - 450 10*3/mm3 Final   03/20/2023 152 140 - 450 10*3/mm3 Final   03/13/2023 244 140 - 450 10*3/mm3 Final   03/07/2023   Corrected     Comment:     Plt clumps. Results removed for inaccuracy per  Code  Corrected result. Previous result was 85 10*3/mm3 on 3/7/2023 at 1148 EST.   02/28/2023 159 140 - 450 10*3/mm3 Final   02/12/2023 150 140 - 450 10*3/mm3 Final   02/11/2023 195 140 - 450 10*3/mm3 Final   02/10/2023 215 140 - 450 10*3/mm3 Final   02/09/2023 260 140 - 450 10*3/mm3 Final       Assessment & Plan   There are no diagnoses linked to this encounter.      Guillermo Salgado   *Squamous cell carcinoma of the gallbladder  Incidentally found on path review from cholecystectomy.  Due to symptomatic cholelithiasis, laparoscopic cholecystectomy 2/11/2023, Dr. 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  5 cm.  Invades perimuscular connective tissue.  Negative for lymph-vascular invasion.  Positive for focal perineural invasion.  Cystic duct margin negative.  Grade 1.  Margins were felt to be negative per pathologist.  Pathologist stage this as a [id number] cancer.  Dr. Horn stated the gallbladder was significantly inflamed and came out in fragments so she is not sure that staging is entirely clinically accurate.  She noted she also sent the patient to Dr. Alex Delgado of hepatobiliary surgery to determine if patient needs formal lymphadenectomy/liver resection.  Per NCCN guidelines: Needs multiphase abdomen/pelvis CT or MRI with IV contrast and  CT chest with or without IV contrast.  Consider staging laparoscopy.  If felt to be resectable, then hepatic resection with lymphadenectomy with or without bile duct excision for malignant involvement.  If felt to be unresectable, then MSI/MMR testing, TMB testing, additional molecular testing to include an TRK.  Options include systemic therapy (preferred), clinical trial (preferred), palliative XRT, or best supportive care.  Per up-to-date guidelines, for a T2 cancer found incidentally after gallbladder surgery: Reexploration and extended cholecystectomy are also indicated.  Re-exploration identifies residual tumor in 40 to 75% of cases, and a high likelihood of liver involvement and jacki metastasis with T2 disease.  Re-resection significantly increases the likelihood of long-term DFS in patients with T2 disease.  In many series, 5-year OS increased from 25-40%, up to % with aggressive surgery.  Up-to-date authors recommend re-resection to resect at least a 2 cm margin of the liver bed and perform portal/hepatoduodenal ligament lymphadenectomy.  Up-to-date authors recommend adjuvant therapy for all patients with completely resected T1b or higher or node positive or margin positive gallbladder cancer.  Up-to-date authors note [facility] suggests adjuvant therapy for all patients with resected gallbladder cancer.  Up-to-date authors note there is no consensus on the optimal adjuvant therapy but they recommend 6 months of postoperative chemotherapy alone with Xeloda monotherapy for most patients.  They note if Xeloda is chosen they start treatment with no more than 1500 mg total dose twice daily.  They note an alternative approaches combining concomitant 5-FU based chemoradiotherapy with 4 months of systemic chemotherapy especially with patients with margin positive disease.  They note in this situation chemotherapy can be with Xeloda monotherapy or Xeloda plus Gemzar.  They note for patients who received chemo XRT  plus adjuvant chemo, there is no consensus on sequencing.  In general they state it is preferable to start with chemotherapy first, complete 3 to 4 months of systemic exposure because this will avoid XRT for patients who are destined to develop early distant disease.  Dr. Alex Delgado, surgical oncology, states although LAD not read on MRI 2/10/2023, he sees bulky LAD on his imaging review.  He states this is currently not resectable due to the bulky LAD and requests chemotherapy in hopes this will become resectable.  He requests avoiding XRT.  Per the Topaz 1 trial, plan Gemzar 1000 mg/m2 D1, D8 and cisplatin 25 mg/m2 D1, D8 and durvalumab 1500 mg flat dose.  Cycles every 3 weeks.  Up to 8 cycles.  This can be followed by durvalumab 1500 mg flat dose every 4 weeks.  Durvalumab was associated with a longer PFS and a higher objective response rate (26.7% versus 18.7% without durvalumab).  The Topaz 1 trial included previously untreated unresectable locally advanced or metastatic intrahepatic or extrahepatic cholangiocarcinoma or gallbladder cancer.  Also, cisplatin and Gemzar is a common regimen in squamous cell carcinoma of a more common primary (lung).  Therefore, I feel cisplatin Gemzar durvalumab is the best regimen for this currently unresectable squamous cell carcinoma of the gallbladder.  Dr. Delgado is recommended a prechemotherapy PET scan and then to check a PET scan again after roughly 2 months of chemotherapy to see if there has been enough improvement to see if this is resectable.  PET 3/2/2023: Intensely active tanner hepatis nodes, and ill-defined hypodensity in the liver parenchyma adjacent to gallbladder fossa, corresponding to areas of apparent masslike invasion seen on recent MRI, felt to be concerning for malignant invasion of the liver.  A few subcentimeter hypodense lesions in the liver too small to characterize.  Could not exclude peritoneal invasion/carcinomatosis.  Suggestion of a patent active  thyroid nodule.  Radiologist recommended correlation with thyroid ultrasound.  Pretreatment CA 19-9 and CEA normal  3/13/2023: Begin Gemzar 1000 mg/m2 D1, D8 and cisplatin 25 mg/m2 D1, D8 and durvalumab 1500 mg flat dose.  Cycles every 3 weeks.  Up to 8 cycles.  This can be followed by durvalumab 1500 mg flat dose every 4 weeks   (if this cannot be resected, then the Topaz 1 trial continue durvalumab until progression or toxicity.  If this can be resected, likely would not continue single agent durvalumab).  3/20/2023, C1 D8: AST 86, .  Stop atorvastatin that was resumed at discharge.  Hold Gemzar.  Give cisplatin.  3/27/2023: AST 25, ALT 57.  With improvement in LFTs after holding Gemzar, thought to Gemzar likely the cause.  However:  4/3/23, C2 D1: AST up to 116, ALT up to 213.  Held Gemzar again (per DrMarcial #2).  The thought was if further elevation in LFTs may need to consider steroids for durvalumab liver toxicity.  4/10/23, C2 D8: AST 18, ALT 49.  Gemzar given at 25% dose reduction.  5/1/2023: C3d 8, one-time dose reduction of Gemzar by another 25% due to .  Maintain same dose cisplatin.  Add Neulasta if insurance will allow  (Was having mild increase in tinnitus but this is tolerable.  He understands cisplatin can significantly and permanently worsen tinnitus.  He denies hearing loss.  He understands this risk and wants to continue cisplatin at same dose.  PET 5/8/2023 (after C3d8): A few new pulmonary nodules.  Cluster of index nodules, RLL, up to 0.8 cm.  Index sub-6 mm nodule RUL also new.  Radiologist notes the pulmonary nodules are nonspecific but in context are concerning for lung metastasis.  Gastrohepatic node 1.4 cm from 0.8 cm with SUV 6.7, from 3.9.  Raven hepatic node 2.7 cm, unchanged in size, but SUV 8.8, from 7.4.  The liver along the gallbladder fossa with SUV 7.8, from 9.5, the size of the hypermetabolic activity is unchanged, 5.1 x 3.5 cm.  Smaller area of uptake above that of the  adjacent hepatic parenchyma, SUV 3, as before.  New or more defined soft tissue density nodules anterior inferior to the central liver.  Index lesion inferior to the gallbladder fossa now much more defined, 1.4 x 0.9 cm with SUV 3.3.  Index lesion along anterior lateral aspect of central liver new, 0.7 cm.  Radiologist notes this suggests peritoneal carcinomatosis.  This seems mostly consistent with progression.  NCCN options include FOLFOX, Gemzar Abraxane, FOLFIRI, Regorafenib.  FOLFOX is preferred  5/12/2023: Dr. Alex Delgado agrees with me: PET is consistent with progression.  He is no longer felt to be a surgical candidate.  Patient understands this is no longer felt to be curable.  Progressed on Gemzar cisplatin durvalumab.  Therefore, per NCCN guidelines switch to FOLFOX.  He requests 2 weeks break before beginning FOLFOX to try and improve the fatigue that worsened towards the end of his last chemo regimen.  5/16/2023: Patient wants a break from chemotherapy and does not want to begin FOLFOX.  He would like observation and 1 significant growth is seen on scans he will consider FOLFIRI.  He would like to avoid Oxaliplatin because he is worried about developing neuropathy.  CT 7/10/2023: Progression.  Raven hepatis mass larger, causing common hepatic duct obstruction with mild to moderate by lobar intrahepatic biliary dilation.  Thrombus main portal vein extending to the splenoportal confluence.  Discussed with radiologist, she thinks this is direct extension of tumor (tumor thrombus).  7/17/2023: Discussed options including comfort care with hospice versus chemotherapy.  I explained NCCN guidelines prefer FOLFOX.  I explained since no response to initial chemo, and this tumor is typically not very responsive to chemo, the chances of response to further chemo is low.  After long discussion, including his wife, he has decided to try FOLFOX.  He states he will be quick to stop it if he has too many side  effects  7/17/2023: C1 D1 FOLFOX.  8/14/2023: C3 D1 (last) FOLFOX.  He chose to stop treatment because of fatigue which she attributed to chemo  CT 9/7/23 (after 3 cycles of FOLFOX): Improvement of conglomerate infiltrating soft tissue mass and LAD centered in the periportal region.  9/11/2023: Reviewed response to chemo.  Although he previously decided to stop chemo, offered changing cycles every 3 weeks from every 2 weeks or significantly dropping the dose which could even be a 50% dose reduction.  However, he is comfortable with his decision to stop chemotherapy.  He wants to focus on quality of life which is a very reasonable decision.  He has decided on no more scans or follow-up in this office and has decided to stop Eliquis and enroll in hospice.    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  9/11/2023: Has a large out-of-pocket expense for Eliquis.  Has decided to focus on comfort care with hospice and stop Eliquis    *Suggestion of PET active thyroid nodule on PET 3/2/2023.  PET 3/2/2023: Radiologist recommended thyroid ultrasound.    3/7/2023: Ordered thyroid ultrasound (but patient understands even if something looks concerning, addressed the gallbladder cancer first and only work on the thyroid nodule if gallbladder cancer treatment has completed and is felt to be in remission  Thyroid ultrasound 3/8/2023: TI-RADS 3.  Radiologist stated consider 1 year follow-up if clinically indicated.  5/12/2023: Gallbladder cancer is now metastatic (and noncurable).  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  Did not worsen.  No issues otherwise.  7/14/2023: Planning FOLFOX.  He understands this is expected to worsen his neuropathy  9/11/2023: He  stopped FOLFOX after 3 cycles.  He plans no more chemotherapy.    Plan  Enrollment in hospice  No follow-up here  He is stopping Eliquis    41 minutes.  Total time.  Same day

## 2023-09-11 ENCOUNTER — OFFICE VISIT (OUTPATIENT)
Dept: ONCOLOGY | Facility: CLINIC | Age: 73
End: 2023-09-11
Payer: MEDICARE

## 2023-09-11 ENCOUNTER — LAB (OUTPATIENT)
Dept: LAB | Facility: HOSPITAL | Age: 73
End: 2023-09-11
Payer: MEDICARE

## 2023-09-11 VITALS
TEMPERATURE: 97.7 F | SYSTOLIC BLOOD PRESSURE: 125 MMHG | HEIGHT: 68 IN | WEIGHT: 163 LBS | DIASTOLIC BLOOD PRESSURE: 83 MMHG | HEART RATE: 71 BPM | BODY MASS INDEX: 24.71 KG/M2 | RESPIRATION RATE: 16 BRPM | OXYGEN SATURATION: 98 %

## 2023-09-11 DIAGNOSIS — C23 GALLBLADDER CANCER: Primary | ICD-10-CM

## 2023-09-11 DIAGNOSIS — C23 GALLBLADDER CANCER: ICD-10-CM

## 2023-09-11 DIAGNOSIS — R79.89 ABNORMAL LFTS: ICD-10-CM

## 2023-09-11 LAB
ALBUMIN SERPL-MCNC: 4.2 G/DL (ref 3.5–5.2)
ALBUMIN/GLOB SERPL: 1.4 G/DL
ALP SERPL-CCNC: 66 U/L (ref 39–117)
ALT SERPL W P-5'-P-CCNC: 32 U/L (ref 1–41)
ANION GAP SERPL CALCULATED.3IONS-SCNC: 13.9 MMOL/L (ref 5–15)
AST SERPL-CCNC: 62 U/L (ref 1–40)
BASOPHILS # BLD AUTO: 0.01 10*3/MM3 (ref 0–0.2)
BASOPHILS NFR BLD AUTO: 0.1 % (ref 0–1.5)
BILIRUB SERPL-MCNC: 0.8 MG/DL (ref 0–1.2)
BUN SERPL-MCNC: 15 MG/DL (ref 8–23)
BUN/CREAT SERPL: 12.7 (ref 7–25)
CALCIUM SPEC-SCNC: 9.5 MG/DL (ref 8.6–10.5)
CHLORIDE SERPL-SCNC: 98 MMOL/L (ref 98–107)
CO2 SERPL-SCNC: 25.1 MMOL/L (ref 22–29)
CREAT SERPL-MCNC: 1.18 MG/DL (ref 0.7–1.3)
DEPRECATED RDW RBC AUTO: 63.2 FL (ref 37–54)
EGFRCR SERPLBLD CKD-EPI 2021: 64.3 ML/MIN/1.73
EOSINOPHIL # BLD AUTO: 0.1 10*3/MM3 (ref 0–0.4)
EOSINOPHIL NFR BLD AUTO: 1.3 % (ref 0.3–6.2)
ERYTHROCYTE [DISTWIDTH] IN BLOOD BY AUTOMATED COUNT: 18.3 % (ref 12.3–15.4)
GLOBULIN UR ELPH-MCNC: 3.1 GM/DL
GLUCOSE SERPL-MCNC: 118 MG/DL (ref 65–99)
HCT VFR BLD AUTO: 34.7 % (ref 37.5–51)
HGB BLD-MCNC: 11.4 G/DL (ref 13–17.7)
IMM GRANULOCYTES # BLD AUTO: 0.07 10*3/MM3 (ref 0–0.05)
IMM GRANULOCYTES NFR BLD AUTO: 0.9 % (ref 0–0.5)
LYMPHOCYTES # BLD AUTO: 1.37 10*3/MM3 (ref 0.7–3.1)
LYMPHOCYTES NFR BLD AUTO: 17.2 % (ref 19.6–45.3)
MCH RBC QN AUTO: 31.7 PG (ref 26.6–33)
MCHC RBC AUTO-ENTMCNC: 32.9 G/DL (ref 31.5–35.7)
MCV RBC AUTO: 96.4 FL (ref 79–97)
MONOCYTES # BLD AUTO: 0.92 10*3/MM3 (ref 0.1–0.9)
MONOCYTES NFR BLD AUTO: 11.5 % (ref 5–12)
NEUTROPHILS NFR BLD AUTO: 5.5 10*3/MM3 (ref 1.7–7)
NEUTROPHILS NFR BLD AUTO: 69 % (ref 42.7–76)
NRBC BLD AUTO-RTO: 0 /100 WBC (ref 0–0.2)
PLATELET # BLD AUTO: 157 10*3/MM3 (ref 140–450)
PMV BLD AUTO: 9 FL (ref 6–12)
POTASSIUM SERPL-SCNC: 4.5 MMOL/L (ref 3.5–5.2)
PROT SERPL-MCNC: 7.3 G/DL (ref 6–8.5)
RBC # BLD AUTO: 3.6 10*6/MM3 (ref 4.14–5.8)
SODIUM SERPL-SCNC: 137 MMOL/L (ref 136–145)
WBC NRBC COR # BLD: 7.97 10*3/MM3 (ref 3.4–10.8)

## 2023-09-11 PROCEDURE — 85025 COMPLETE CBC W/AUTO DIFF WBC: CPT

## 2023-09-11 PROCEDURE — 80053 COMPREHEN METABOLIC PANEL: CPT

## 2023-09-11 PROCEDURE — 36415 COLL VENOUS BLD VENIPUNCTURE: CPT

## 2025-07-10 NOTE — PROGRESS NOTES
.     REASON FOR FOLLOWUP :   Squamous cell carcinoma of the gallbladder    HISTORY OF PRESENT ILLNESS:  The patient is a 72 y.o. year old male  who is here for follow-up with the above-mentioned history.    He returns today for follow-up and evaluation prior to cycle 2-day 8 Gemzar, cisplatin.  Gemzar was held last week with cycle 2-day 1 due to further elevation in his LFTs.  He was seen Friday, 4/7/2023 to recheck LFTs with improvement in LFTs with AST 22, , alkaline phosphatase 186.     Today, he is seen with his wife present.  He did experience some arthralgias in his right foot, left knee, and back on Friday following treatment.  He took one dose of Advil which helped.  Arthralgias resolved by Saturday when he woke up.  Continues to struggle with constipation, taking Senokot-S as needed.  Tinnitus remains stable.  Denies any hearing loss.  Denies signs or symptoms of peripheral neuropathy.  Denies fever or chills.  Denies nausea or vomiting.      Past Medical History:   Diagnosis Date   • Cancer     gallbladder   • Gilbert syndrome    • Hyperlipidemia    • Hypertension      Past Surgical History:   Procedure Laterality Date   • CHOLECYSTECTOMY WITH INTRAOPERATIVE CHOLANGIOGRAM N/A 02/11/2023    Procedure: CHOLECYSTECTOMY LAPAROSCOPIC INTRAOPERATIVE CHOLANGIOGRAM;  Surgeon: Vianey Horn MD;  Location: [facility];  Service: General;  Laterality: N/A;   • COLONOSCOPY     • PILONIDAL CYST DRAINAGE     • VENOUS ACCESS DEVICE (PORT) INSERTION N/A 3/9/2023    Procedure: INSERTION VENOUS ACCESS DEVICE;  Surgeon: Vianey Horn MD;  Location: [facility];  Service: General;  Laterality: N/A;       MEDICATIONS    Current Outpatient Medications:   •  lisinopril (PRINIVIL,ZESTRIL) 20 MG tablet, Take 1 tablet by mouth Daily., Disp: , Rfl:   •  OLANZapine (zyPREXA) 5 MG tablet, Take 1 tablet by mouth Every Night. Take on day 2, 3, and 4 and Days 9, 10, 11 after chemotherapy., Disp: 48 tablet, Rfl:  [facility] Care Coordination Contact    Member became effective with [facility] on 7/1/2025 with [employer].     Welcome letter sent.     Mari Pitt  Care Management Specialist   [facility]   [phone]   "0  •  ondansetron (ZOFRAN) 8 MG tablet, Take 1 tablet by mouth 3 (Three) Times a Day As Needed for Nausea or Vomiting., Disp: 30 tablet, Rfl: 5  •  atorvastatin (LIPITOR) 40 MG tablet, Take 1 tablet by mouth Every Night., Disp: , Rfl:   No current facility-administered medications for this visit.    Facility-Administered Medications Ordered in Other Visits:   •  magnesium sulfate 0.5 g in sodium chloride 0.9 % 500 mL infusion, 500 mL/hr, Intravenous, Once, Georgia Julian APRN  •  sodium chloride 0.9 % infusion 250 mL, 250 mL, Intravenous, Once, Georgia Julian APRN    ALLERGIES:   No Known Allergies    SOCIAL HISTORY:       Social History     Socioeconomic History   • Marital status:      Spouse name: Oly   Tobacco Use   • Smoking status: Former     Types: Cigarettes   Vaping Use   • Vaping Use: Never used   Substance and Sexual Activity   • Alcohol use: Yes   • Drug use: Never   • Sexual activity: Defer         FAMILY HISTORY:  Family History   Problem Relation Age of Onset   • Malig Hyperthermia Neg Hx      REVIEW OF SYSTEMS:  Review of Systems   Constitutional: Negative for activity change.   HENT: Negative for nosebleeds and trouble swallowing.    Respiratory: Negative for shortness of breath and wheezing.    Cardiovascular: Negative for chest pain and palpitations.   Gastrointestinal: Negative for constipation, diarrhea and nausea.   Genitourinary: Negative for dysuria and hematuria.   Musculoskeletal: Negative for arthralgias and myalgias.   Neurological: Negative for seizures and syncope.   Hematological: Negative for adenopathy. Does not bruise/bleed easily.   Psychiatric/Behavioral: Negative for confusion.          Vitals:    04/10/23 0853   BP: 121/83   Pulse: 83   Resp: 18   Temp: 97.1 °F (36.2 °C)   TempSrc: Temporal   SpO2: 98%   Weight: 71.3 kg (157 lb 1.6 oz)   Height: 173 cm (68.11\")   PainSc: 0-No pain     Current Status 4/10/2023   ECOG score 0      PHYSICAL EXAM:  "     CONSTITUTIONAL:  Vital signs reviewed.  No distress, looks comfortable.  EYES:  Conjunctiva and lids unremarkable.  PERRLA  EARS,NOSE,MOUTH,THROAT:  Ears and nose appear unremarkable.  Lips, teeth, gums appear unremarkable.  RESPIRATORY:  Normal respiratory effort.  Lungs clear to auscultation bilaterally.  CARDIOVASCULAR:  Normal S1, S2.  No murmurs rubs or gallops.  No significant lower extremity edema.  GASTROINTESTINAL: Abdomen appears unremarkable.  Nontender.  No hepatomegaly.  No splenomegaly.  LYMPHATIC:  No cervical, supraclavicular, axillary lymphadenopathy.  SKIN:  Warm.  No rashes.  PSYCHIATRIC:  Normal judgment and insight.  Normal mood and affect.       RECENT LABS:        WBC   Date Value Ref Range Status   04/10/2023 7.85 3.40 - 10.80 10*3/mm3 Final   04/03/2023 6.55 3.40 - 10.80 10*3/mm3 Final   03/20/2023 4.31 3.40 - 10.80 10*3/mm3 Final   03/13/2023 7.30 3.40 - 10.80 10*3/mm3 Final   03/07/2023 8.40 3.40 - 10.80 10*3/mm3 Final   02/28/2023 8.11 3.40 - 10.80 10*3/mm3 Final   02/12/2023 8.60 3.40 - 10.80 10*3/mm3 Final   02/11/2023 6.21 3.40 - 10.80 10*3/mm3 Final   02/10/2023 5.40 3.40 - 10.80 10*3/mm3 Final   02/09/2023 6.09 3.40 - 10.80 10*3/mm3 Final     Hemoglobin   Date Value Ref Range Status   04/10/2023 12.6 (L) 13.0 - 17.7 g/dL Final   04/03/2023 12.5 (L) 13.0 - 17.7 g/dL Final   03/20/2023 12.5 (L) 13.0 - 17.7 g/dL Final   03/13/2023 13.0 13.0 - 17.7 g/dL Final   03/07/2023 13.8 13.0 - 17.7 g/dL Final   02/28/2023 13.5 13.0 - 17.7 g/dL Final   02/12/2023 11.3 (L) 13.0 - 17.7 g/dL Final   02/11/2023 12.6 (L) 13.0 - 17.7 g/dL Final   02/10/2023 13.2 13.0 - 17.7 g/dL Final   02/09/2023 13.5 13.0 - 17.7 g/dL Final     Platelets   Date Value Ref Range Status   04/10/2023 309 140 - 450 10*3/mm3 Final   04/03/2023 439 140 - 450 10*3/mm3 Final   03/20/2023 152 140 - 450 10*3/mm3 Final   03/13/2023 244 140 - 450 10*3/mm3 Final   03/07/2023   Corrected     Comment:     Plt clumps. Results  removed for inaccuracy per  Code  Corrected result. Previous result was 85 10*3/mm3 on 3/7/2023 at 1148 EST.   02/28/2023 159 140 - 450 10*3/mm3 Final   02/12/2023 150 140 - 450 10*3/mm3 Final   02/11/2023 195 140 - 450 10*3/mm3 Final   02/10/2023 215 140 - 450 10*3/mm3 Final   02/09/2023 260 140 - 450 10*3/mm3 Final       Assessment & Plan   There are no diagnoses linked to this encounter.      Guillermo Salgado   *Squamous cell carcinoma of the gallbladder  · Incidentally found on path review from cholecystectomy.  · Due to symptomatic cholelithiasis, laparoscopic cholecystectomy 2/11/2023, Dr. 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  5 cm.  Invades perimuscular connective tissue.  Negative for lymph-vascular invasion.  Positive for focal perineural invasion.  Cystic duct margin negative.  Grade 1.  Margins were felt to be negative per pathologist.  Pathologist stage this as a [id number] cancer.  · Dr. Horn stated the gallbladder was significantly inflamed and came out in fragments so she is not sure that staging is entirely clinically accurate.  She noted she also sent the patient to Dr. Alex Delgado of hepatobiliary surgery to determine if patient needs formal lymphadenectomy/liver resection.  · Per NCCN guidelines: Needs multiphase abdomen/pelvis CT or MRI with IV contrast and CT chest with or without IV contrast.  Consider staging laparoscopy.  If felt to be resectable, then hepatic resection with lymphadenectomy with or without bile duct excision for malignant involvement.  If felt to be unresectable, then MSI/MMR testing, TMB testing, additional molecular testing to include an TRK.  Options include systemic therapy (preferred), clinical trial (preferred), palliative XRT, or best supportive care.  · Per up-to-date guidelines, for a T2 cancer found incidentally after gallbladder surgery: Reexploration and extended cholecystectomy are also indicated.  Re-exploration  identifies residual tumor in 40 to 75% of cases, and a high likelihood of liver involvement and jacki metastasis with T2 disease.  Re-resection significantly increases the likelihood of long-term DFS in patients with T2 disease.  In many series, 5-year OS increased from 25-40%, up to % with aggressive surgery.  Up-to-date authors recommend re-resection to resect at least a 2 cm margin of the liver bed and perform portal/hepatoduodenal ligament lymphadenectomy.  · Up-to-date authors recommend adjuvant therapy for all patients with completely resected T1b or higher or node positive or margin positive gallbladder cancer.  Up-to-date authors note [facility]O suggests adjuvant therapy for all patients with resected gallbladder cancer.  Up-to-date authors note there is no consensus on the optimal adjuvant therapy but they recommend 6 months of postoperative chemotherapy alone with Xeloda monotherapy for most patients.  They note if Xeloda is chosen they start treatment with no more than 1500 mg total dose twice daily.  They note an alternative approaches combining concomitant 5-FU based chemoradiotherapy with 4 months of systemic chemotherapy especially with patients with margin positive disease.  They note in this situation chemotherapy can be with Xeloda monotherapy or Xeloda plus Gemzar.  They note for patients who received chemo XRT plus adjuvant chemo, there is no consensus on sequencing.  In general they state it is preferable to start with chemotherapy first, complete 3 to 4 months of systemic exposure because this will avoid XRT for patients who are destined to develop early distant disease.  · Dr. Alex Delgado, surgical oncology, states although LAD not read on MRI 2/10/2023, he sees bulky LAD on his imaging review.  He states this is currently not resectable due to the bulky LAD and requests chemotherapy in hopes this will become resectable.  He requests avoiding XRT.  · Per the Topaz 1 trial, plan Gemzar 1000  mg/m2 D1, D8 and cisplatin 25 mg/m2 D1, D8 and durvalumab 1500 mg flat dose.  Cycles every 3 weeks.  Up to 8 cycles.  This can be followed by durvalumab 1500 mg flat dose every 4 weeks.  Durvalumab was associated with a longer PFS and a higher objective response rate (26.7% versus 18.7% without durvalumab).  The Topaz 1 trial included previously untreated unresectable locally advanced or metastatic intrahepatic or extrahepatic cholangiocarcinoma or gallbladder cancer.  Also, cisplatin and Gemzar is a common regimen in squamous cell carcinoma of a more common primary (lung).  Therefore, I feel cisplatin Gemzar durvalumab is the best regimen for this currently unresectable squamous cell carcinoma of the gallbladder.  · Dr. Delgado is recommended a prechemotherapy PET scan and then to check a PET scan again after roughly 2 months of chemotherapy to see if there has been enough improvement to see if this is resectable.  · PET 3/2/2023: Intensely active tanner hepatis nodes, and ill-defined hypodensity in the liver parenchyma adjacent to gallbladder fossa, corresponding to areas of apparent masslike invasion seen on recent MRI, felt to be concerning for malignant invasion of the liver.  A few subcentimeter hypodense lesions in the liver too small to characterize.  Could not exclude peritoneal invasion/carcinomatosis.  Suggestion of a patent active thyroid nodule.  Radiologist recommended correlation with thyroid ultrasound.  · Pretreatment CA 19-9 and CEA normal  · 3/13/2023: Begin Gemzar 1000 mg/m2 D1, D8 and cisplatin 25 mg/m2 D1, D8 and durvalumab 1500 mg flat dose.  Cycles every 3 weeks.  Up to 8 cycles.  This can be followed by durvalumab 1500 mg flat dose every 4 weeks   · (if this cannot be resected, then the Topaz 1 trial continue durvalumab until progression or toxicity.  If this can be resected, likely would not continue single agent durvalumab).  · 3/13/2023 cycle 1 day 1 Gemzar, cisplatin, durvalumab.    · 3/20/2023: Cycle 1 day 8 Gemzar and cisplatin.  Elevated LFTs with AST 86, .  Patient resumed his atorvastatin after discharge from the hospital and is not taking tylenol.  Discussed with Dr. Benedict, we will proceed with Cisplatin and hold Gemzar.  He will return twice weekly for STAT CMP with RN review   · to monitor liver functions closely.  If worsening LFTs after holding Gemzar, may need to consider steroids.  · 3/23/2023 AST 54, .  · 3/27/2023 AST 25, ALT 57.  With an improvement in LFTs after holding Gemzar, Gemzar likely the cause.   · 4/3/2023: Cycle 2-day 1 Gemzar, cisplatin, durvalumab.  Unfortunately, LFTs further increased with , .  Total bilirubin normal at 0.9.  We will hold Gemzar today.  If further elevation in LFTs, may need to consider starting steroids.  Patient will return Thursday for a stat CMP with RN review to monitor closely.  This was all discussed with Dr. Neumann (MD #2) in Dr. Benedict's absence.  · 4/10/2023: Cycle 2-day 8 Gemzar and cisplatin.  LFTs significantly improved with AST 18, ALT 49.  Discussed with Dr. Benedict, will proceed with Gemzar today with a 25% dose reduction.    *Suggestion of PET active thyroid nodule on PET 3/2/2023.  · PET 3/2/2023: Radiologist recommended thyroid ultrasound.    · 3/7/2023: Ordered thyroid ultrasound (but patient understands even if something looks concerning, addressed the gallbladder cancer first and only work on the thyroid nodule if gallbladder cancer treatment has completed and is felt to be in remission  · 3/8/2023: Thyroid ultrasound with right thyroid lobe measuring 5.2 x 2.1 x 1.8 cm in the left thyroid lobe measuring 3.5 x 1.6 x 1.4 cm.  There are multiple thyroid nodules including a 1.5 x 1.1 x 1.1 cm solid-appearing well marginated wider than tall nodule.  TI-RADS-3.  Consider 1 year follow-up if clinically indicated.    · Repeat thyroid ultrasound 1 year from last, approximately 3/2024.    *Not needed now but,  regarding surveillance after completing adjuvant therapy:  · Up-to-date authors state there is no agreed-upon surveillance but after completing adjuvant therapy they follow CEA and CA 19 9 every 3 to 4 months for the first 2 years after surgery, then every 6 months for 1 more year with imaging only as clinically indicated.  · NCCN guidelines state consider imaging every 3 to 6 months x 2 years, then every 6 to 12 months for up to 5 years or as clinically indicated and consider CEA and CA 19-9 as clinically indicated.    Plan:   · Proceed today with cycle 2-day 8 Gemzar and cisplatin.  Gemzar will be given on a 25% dose reduction due to elevated LFTs following cycle 1 day 1.  · Gemzar D1, D8 and cisplatin D1, D8 and durvalumab 1500 mg flat dose.   · Cycles every 3 weeks.  Up to 8 cycles.    · (Plan PET after roughly 2 months (after 3 cycles) to see if there is enough improvement to see if this is resectable.  · This can be followed by durvalumab 1500 mg flat dose every 4 weeks (single agent durvalumab continues until progression or toxicity if this is not resectable after chemotherapy).    · Continue to hold atorvastatin until completion of chemotherapy.  · Arranged appointments through first 3 cycles including PET scan (around 5/8/2023) after cycle 3 and appointment with Dr. Benedict and Dr. Delgado after PET scan, that same week.  Would be due for cycle 4 on 5/15/2023, so would like decisions made regarding surgery prior to that  · Port placement by Dr. Horn.  · Anticipate repeat thyroid ultrasound 1 year from last, approximately 3/2024.     The patient is on high risk medication that requires close monitoring for toxicity.  The patient was discussed with Dr. Benedict who is in agreement with today's plan of care.    Georgia Julian, TY  04/10/23

## (undated) DEVICE — LAPAROSCOPIC SMOKE FILTRATION SYSTEM: Brand: PALL LAPAROSHIELD® PLUS LAPAROSCOPIC SMOKE FILTRATION SYSTEM

## (undated) DEVICE — ADHS SKIN SURG TISS VISC PREMIERPRO EXOFIN HI/VISC FAST/DRY

## (undated) DEVICE — PK PROC MINOR TOWER 40

## (undated) DEVICE — ANTIBACTERIAL UNDYED BRAIDED (POLYGLACTIN 910), SYNTHETIC ABSORBABLE SUTURE: Brand: COATED VICRYL

## (undated) DEVICE — DRAPE,REIN 53X77,STERILE: Brand: MEDLINE

## (undated) DEVICE — DRP C/ARM 41X74IN

## (undated) DEVICE — SUT PROLN 2/0 SH 36IN 8523H

## (undated) DEVICE — ENDOCUT SCISSOR TIP, DISPOSABLE: Brand: RENEW

## (undated) DEVICE — NDL HYPO PRECISIONGLIDE REG 25G 1 1/2

## (undated) DEVICE — DISPOSABLE MONOPOLAR ENDOSCOPIC CORD 10 FT. (3M): Brand: KIRWAN

## (undated) DEVICE — ENDOPATH XCEL UNIVERSAL TROCAR STABLILITY SLEEVES: Brand: ENDOPATH XCEL

## (undated) DEVICE — TRAP FLD MINIVAC MEGADYNE 100ML

## (undated) DEVICE — DECANTER BAG 9": Brand: MEDLINE INDUSTRIES, INC.

## (undated) DEVICE — JACKSON-PRATT 100CC BULB RESERVOIR: Brand: CARDINAL HEALTH

## (undated) DEVICE — CATH IV INSYTE AUTOGARD 14G 1 1/2IN ORNG

## (undated) DEVICE — TBG PENCL TELESCP MEGADYNE SMOKE EVAC 10FT

## (undated) DEVICE — EXTENSION SET, MALE LUER LOCK ADAPTER WITH RETRACTABLE COLLAR

## (undated) DEVICE — CATH CHOLANG 4.5F18IN BRGNDY

## (undated) DEVICE — GLV SURG BIOGEL LTX PF 6 1/2

## (undated) DEVICE — SUT MNCRYL 4/0 PS2 18 IN

## (undated) DEVICE — LAPAROVUE VISIBILITY SYSTEM LAPAROSCOPIC SOLUTIONS: Brand: LAPAROVUE

## (undated) DEVICE — STPCK 3WY D201 DISCOFIX

## (undated) DEVICE — APPL CHLORAPREP HI/LITE 26ML ORNG

## (undated) DEVICE — ENDOPATH XCEL BLADELESS TROCARS WITH STABILITY SLEEVES: Brand: ENDOPATH XCEL

## (undated) DEVICE — Device

## (undated) DEVICE — CONTAINER,SPECIMEN,OR STERILE,4OZ: Brand: MEDLINE

## (undated) DEVICE — LOU LAP CHOLE: Brand: MEDLINE INDUSTRIES, INC.

## (undated) DEVICE — SUT VIC 0/0 UR6 27IN DYED J603H

## (undated) DEVICE — SYR LUERLOK 20CC BX/50

## (undated) DEVICE — SOL NACL 0.9PCT 100ML SGL

## (undated) DEVICE — CVR PROB GEN PURP W ISOSILK 6X48

## (undated) DEVICE — ENDOPOUCH RETRIEVER SPECIMEN RETRIEVAL BAGS: Brand: ENDOPOUCH RETRIEVER

## (undated) DEVICE — INTENDED FOR TISSUE SEPARATION, AND OTHER PROCEDURES THAT REQUIRE A SHARP SURGICAL BLADE TO PUNCTURE OR CUT.: Brand: BARD-PARKER ® CARBON RIB-BACK BLADES